# Patient Record
Sex: MALE | Race: WHITE | NOT HISPANIC OR LATINO | Employment: PART TIME | URBAN - METROPOLITAN AREA
[De-identification: names, ages, dates, MRNs, and addresses within clinical notes are randomized per-mention and may not be internally consistent; named-entity substitution may affect disease eponyms.]

---

## 2017-05-11 ENCOUNTER — HOSPITAL ENCOUNTER (INPATIENT)
Facility: HOSPITAL | Age: 57
LOS: 15 days | Discharge: HOME HEALTH | End: 2017-05-26
Attending: PHYSICAL MEDICINE & REHABILITATION | Admitting: PHYSICAL MEDICINE & REHABILITATION

## 2017-05-11 LAB
GLUCOSE BLDC GLUCOMTR-MCNC: 191 MG/DL (ref 70–130)
GLUCOSE BLDC GLUCOMTR-MCNC: 311 MG/DL (ref 70–130)

## 2017-05-11 PROCEDURE — 63710000001 INSULIN DETEMER PER 5 UNITS: Performed by: PHYSICAL MEDICINE & REHABILITATION

## 2017-05-11 PROCEDURE — 63710000001 INSULIN REGULAR HUMAN PER 5 UNITS: Performed by: PHYSICAL MEDICINE & REHABILITATION

## 2017-05-11 PROCEDURE — 82962 GLUCOSE BLOOD TEST: CPT

## 2017-05-11 RX ORDER — FENOFIBRATE 145 MG/1
145 TABLET, COATED ORAL DAILY
COMMUNITY
End: 2017-10-24 | Stop reason: SDUPTHER

## 2017-05-11 RX ORDER — LEVETIRACETAM 500 MG/1
1000 TABLET ORAL 2 TIMES DAILY
Status: DISCONTINUED | OUTPATIENT
Start: 2017-05-12 | End: 2017-05-11

## 2017-05-11 RX ORDER — NICOTINE POLACRILEX 4 MG
15 LOZENGE BUCCAL
Status: DISCONTINUED | OUTPATIENT
Start: 2017-05-11 | End: 2017-05-26 | Stop reason: HOSPADM

## 2017-05-11 RX ORDER — ROSUVASTATIN CALCIUM 10 MG/1
10 TABLET, COATED ORAL NIGHTLY
Status: DISCONTINUED | OUTPATIENT
Start: 2017-05-11 | End: 2017-05-16

## 2017-05-11 RX ORDER — AMITRIPTYLINE HYDROCHLORIDE 25 MG/1
25 TABLET, FILM COATED ORAL NIGHTLY
COMMUNITY

## 2017-05-11 RX ORDER — CARVEDILOL PHOSPHATE 20 MG/1
20 CAPSULE, EXTENDED RELEASE ORAL
Status: DISCONTINUED | OUTPATIENT
Start: 2017-05-12 | End: 2017-05-26 | Stop reason: HOSPADM

## 2017-05-11 RX ORDER — UREA 10 %
3 LOTION (ML) TOPICAL NIGHTLY PRN
Status: DISCONTINUED | OUTPATIENT
Start: 2017-05-11 | End: 2017-05-26 | Stop reason: HOSPADM

## 2017-05-11 RX ORDER — ATORVASTATIN CALCIUM 20 MG/1
20 TABLET, FILM COATED ORAL DAILY
Status: ON HOLD | COMMUNITY
End: 2017-05-25

## 2017-05-11 RX ORDER — ASPIRIN 81 MG/1
81 TABLET, CHEWABLE ORAL DAILY
Status: DISCONTINUED | OUTPATIENT
Start: 2017-05-12 | End: 2017-05-26 | Stop reason: HOSPADM

## 2017-05-11 RX ORDER — ATORVASTATIN CALCIUM 20 MG/1
20 TABLET, FILM COATED ORAL NIGHTLY
Status: DISCONTINUED | OUTPATIENT
Start: 2017-05-11 | End: 2017-05-11 | Stop reason: CLARIF

## 2017-05-11 RX ORDER — AMITRIPTYLINE HYDROCHLORIDE 25 MG/1
25 TABLET, FILM COATED ORAL NIGHTLY
Status: DISCONTINUED | OUTPATIENT
Start: 2017-05-11 | End: 2017-05-26 | Stop reason: HOSPADM

## 2017-05-11 RX ORDER — FENOFIBRATE 145 MG/1
145 TABLET, COATED ORAL DAILY
Status: DISCONTINUED | OUTPATIENT
Start: 2017-05-12 | End: 2017-05-26 | Stop reason: HOSPADM

## 2017-05-11 RX ORDER — LEVETIRACETAM 500 MG/1
1000 TABLET, EXTENDED RELEASE ORAL DAILY
Status: DISCONTINUED | OUTPATIENT
Start: 2017-05-12 | End: 2017-05-26 | Stop reason: HOSPADM

## 2017-05-11 RX ORDER — HYDRALAZINE HYDROCHLORIDE 10 MG/1
10 TABLET, FILM COATED ORAL EVERY 6 HOURS PRN
Status: DISCONTINUED | OUTPATIENT
Start: 2017-05-11 | End: 2017-05-26 | Stop reason: HOSPADM

## 2017-05-11 RX ORDER — LISINOPRIL 40 MG/1
40 TABLET ORAL
Status: DISCONTINUED | OUTPATIENT
Start: 2017-05-12 | End: 2017-05-26 | Stop reason: HOSPADM

## 2017-05-11 RX ORDER — AMLODIPINE BESYLATE 5 MG/1
5 TABLET ORAL DAILY
COMMUNITY
End: 2017-05-26 | Stop reason: HOSPADM

## 2017-05-11 RX ORDER — CLOPIDOGREL BISULFATE 75 MG/1
150 TABLET ORAL DAILY
Status: DISCONTINUED | OUTPATIENT
Start: 2017-05-12 | End: 2017-05-26 | Stop reason: HOSPADM

## 2017-05-11 RX ORDER — CARVEDILOL 25 MG/1
25 TABLET ORAL 2 TIMES DAILY WITH MEALS
COMMUNITY

## 2017-05-11 RX ORDER — AMLODIPINE BESYLATE 5 MG/1
5 TABLET ORAL
Status: DISCONTINUED | OUTPATIENT
Start: 2017-05-12 | End: 2017-05-13

## 2017-05-11 RX ORDER — LISINOPRIL 40 MG/1
40 TABLET ORAL DAILY
Status: ON HOLD | COMMUNITY
End: 2017-05-25

## 2017-05-11 RX ORDER — CLOPIDOGREL BISULFATE 75 MG/1
75 TABLET ORAL DAILY
COMMUNITY
End: 2017-05-26 | Stop reason: HOSPADM

## 2017-05-11 RX ORDER — DEXTROSE MONOHYDRATE 25 G/50ML
25 INJECTION, SOLUTION INTRAVENOUS
Status: DISCONTINUED | OUTPATIENT
Start: 2017-05-11 | End: 2017-05-26 | Stop reason: HOSPADM

## 2017-05-11 RX ADMIN — INSULIN DETEMIR 80 UNITS: 100 INJECTION, SOLUTION SUBCUTANEOUS at 21:35

## 2017-05-11 RX ADMIN — ROSUVASTATIN CALCIUM 10 MG: 10 TABLET, FILM COATED ORAL at 21:00

## 2017-05-11 RX ADMIN — HUMAN INSULIN 6 UNITS: 100 INJECTION, SOLUTION SUBCUTANEOUS at 17:03

## 2017-05-11 RX ADMIN — HUMAN INSULIN 12 UNITS: 100 INJECTION, SOLUTION SUBCUTANEOUS at 21:34

## 2017-05-11 RX ADMIN — METFORMIN HYDROCHLORIDE 500 MG: 500 TABLET ORAL at 17:03

## 2017-05-11 RX ADMIN — HYDRALAZINE HYDROCHLORIDE 10 MG: 10 TABLET, FILM COATED ORAL at 16:57

## 2017-05-11 RX ADMIN — AMITRIPTYLINE HYDROCHLORIDE 25 MG: 25 TABLET, FILM COATED ORAL at 21:00

## 2017-05-12 PROBLEM — I63.9 STROKE (HCC): Status: ACTIVE | Noted: 2017-05-12

## 2017-05-12 LAB
25(OH)D3 SERPL-MCNC: <5 NG/ML (ref 30–100)
GLUCOSE BLDC GLUCOMTR-MCNC: 157 MG/DL (ref 70–130)
GLUCOSE BLDC GLUCOMTR-MCNC: 165 MG/DL (ref 70–130)
GLUCOSE BLDC GLUCOMTR-MCNC: 215 MG/DL (ref 70–130)
GLUCOSE BLDC GLUCOMTR-MCNC: 222 MG/DL (ref 70–130)

## 2017-05-12 PROCEDURE — 96125 COGNITIVE TEST BY HC PRO: CPT

## 2017-05-12 PROCEDURE — 63710000001 INSULIN REGULAR HUMAN PER 5 UNITS: Performed by: PHYSICAL MEDICINE & REHABILITATION

## 2017-05-12 PROCEDURE — 82962 GLUCOSE BLOOD TEST: CPT

## 2017-05-12 PROCEDURE — 97165 OT EVAL LOW COMPLEX 30 MIN: CPT

## 2017-05-12 PROCEDURE — 97535 SELF CARE MNGMENT TRAINING: CPT

## 2017-05-12 PROCEDURE — 97110 THERAPEUTIC EXERCISES: CPT

## 2017-05-12 PROCEDURE — 63710000001 INSULIN DETEMER PER 5 UNITS: Performed by: PHYSICAL MEDICINE & REHABILITATION

## 2017-05-12 PROCEDURE — 82306 VITAMIN D 25 HYDROXY: CPT | Performed by: PHYSICAL MEDICINE & REHABILITATION

## 2017-05-12 PROCEDURE — 97162 PT EVAL MOD COMPLEX 30 MIN: CPT

## 2017-05-12 RX ORDER — ERGOCALCIFEROL 1.25 MG/1
50000 CAPSULE ORAL
Status: DISCONTINUED | OUTPATIENT
Start: 2017-05-12 | End: 2017-05-26 | Stop reason: HOSPADM

## 2017-05-12 RX ADMIN — HUMAN INSULIN 6 UNITS: 100 INJECTION, SOLUTION SUBCUTANEOUS at 16:54

## 2017-05-12 RX ADMIN — LISINOPRIL 40 MG: 40 TABLET ORAL at 09:10

## 2017-05-12 RX ADMIN — HYDRALAZINE HYDROCHLORIDE 10 MG: 10 TABLET, FILM COATED ORAL at 06:28

## 2017-05-12 RX ADMIN — ROSUVASTATIN CALCIUM 10 MG: 10 TABLET, FILM COATED ORAL at 22:23

## 2017-05-12 RX ADMIN — LEVETIRACETAM 1000 MG: 500 TABLET, EXTENDED RELEASE ORAL at 09:10

## 2017-05-12 RX ADMIN — AMITRIPTYLINE HYDROCHLORIDE 25 MG: 25 TABLET, FILM COATED ORAL at 22:23

## 2017-05-12 RX ADMIN — AMLODIPINE BESYLATE 5 MG: 5 TABLET ORAL at 09:10

## 2017-05-12 RX ADMIN — CLOPIDOGREL 150 MG: 75 TABLET, FILM COATED ORAL at 09:10

## 2017-05-12 RX ADMIN — ASPIRIN 81 MG: 81 TABLET, CHEWABLE ORAL at 09:10

## 2017-05-12 RX ADMIN — METFORMIN HYDROCHLORIDE 500 MG: 500 TABLET ORAL at 09:10

## 2017-05-12 RX ADMIN — ERGOCALCIFEROL 50000 UNITS: 1.25 CAPSULE ORAL at 12:34

## 2017-05-12 RX ADMIN — INSULIN DETEMIR 80 UNITS: 100 INJECTION, SOLUTION SUBCUTANEOUS at 22:23

## 2017-05-12 RX ADMIN — HYDRALAZINE HYDROCHLORIDE 10 MG: 10 TABLET, FILM COATED ORAL at 16:53

## 2017-05-12 RX ADMIN — FENOFIBRATE 145 MG: 145 TABLET ORAL at 12:34

## 2017-05-12 RX ADMIN — HUMAN INSULIN 6 UNITS: 100 INJECTION, SOLUTION SUBCUTANEOUS at 22:44

## 2017-05-12 RX ADMIN — CARVEDILOL PHOSPHATE 20 MG: 20 CAPSULE, EXTENDED RELEASE ORAL at 09:10

## 2017-05-12 RX ADMIN — METFORMIN HYDROCHLORIDE 500 MG: 500 TABLET ORAL at 16:53

## 2017-05-12 RX ADMIN — LINAGLIPTIN 5 MG: 5 TABLET, FILM COATED ORAL at 09:10

## 2017-05-12 RX ADMIN — HUMAN INSULIN 8 UNITS: 100 INJECTION, SOLUTION SUBCUTANEOUS at 12:34

## 2017-05-12 RX ADMIN — HUMAN INSULIN 8 UNITS: 100 INJECTION, SOLUTION SUBCUTANEOUS at 09:17

## 2017-05-13 LAB
GLUCOSE BLDC GLUCOMTR-MCNC: 160 MG/DL (ref 70–130)
GLUCOSE BLDC GLUCOMTR-MCNC: 185 MG/DL (ref 70–130)
GLUCOSE BLDC GLUCOMTR-MCNC: 202 MG/DL (ref 70–130)
GLUCOSE BLDC GLUCOMTR-MCNC: 259 MG/DL (ref 70–130)

## 2017-05-13 PROCEDURE — 97110 THERAPEUTIC EXERCISES: CPT

## 2017-05-13 PROCEDURE — 97535 SELF CARE MNGMENT TRAINING: CPT | Performed by: OCCUPATIONAL THERAPIST

## 2017-05-13 PROCEDURE — 97532: CPT

## 2017-05-13 PROCEDURE — 63710000001 INSULIN REGULAR HUMAN PER 5 UNITS: Performed by: PHYSICAL MEDICINE & REHABILITATION

## 2017-05-13 PROCEDURE — 63710000001 INSULIN ASPART PER 5 UNITS: Performed by: PHYSICAL MEDICINE & REHABILITATION

## 2017-05-13 PROCEDURE — 97112 NEUROMUSCULAR REEDUCATION: CPT | Performed by: OCCUPATIONAL THERAPIST

## 2017-05-13 PROCEDURE — 82962 GLUCOSE BLOOD TEST: CPT

## 2017-05-13 RX ORDER — AMLODIPINE BESYLATE 5 MG/1
5 TABLET ORAL ONCE
Status: COMPLETED | OUTPATIENT
Start: 2017-05-13 | End: 2017-05-13

## 2017-05-13 RX ORDER — AMLODIPINE BESYLATE 10 MG/1
10 TABLET ORAL
Status: DISCONTINUED | OUTPATIENT
Start: 2017-05-14 | End: 2017-05-26 | Stop reason: HOSPADM

## 2017-05-13 RX ADMIN — LEVETIRACETAM 1000 MG: 500 TABLET, EXTENDED RELEASE ORAL at 08:19

## 2017-05-13 RX ADMIN — LISINOPRIL 40 MG: 40 TABLET ORAL at 08:19

## 2017-05-13 RX ADMIN — INSULIN ASPART 8 UNITS: 100 INJECTION, SOLUTION INTRAVENOUS; SUBCUTANEOUS at 12:01

## 2017-05-13 RX ADMIN — FENOFIBRATE 145 MG: 145 TABLET ORAL at 08:19

## 2017-05-13 RX ADMIN — ROSUVASTATIN CALCIUM 10 MG: 10 TABLET, FILM COATED ORAL at 22:06

## 2017-05-13 RX ADMIN — CLOPIDOGREL 150 MG: 75 TABLET, FILM COATED ORAL at 08:18

## 2017-05-13 RX ADMIN — METFORMIN HYDROCHLORIDE 500 MG: 500 TABLET ORAL at 17:12

## 2017-05-13 RX ADMIN — HUMAN INSULIN 6 UNITS: 100 INJECTION, SOLUTION SUBCUTANEOUS at 08:19

## 2017-05-13 RX ADMIN — INSULIN ASPART 5 UNITS: 100 INJECTION, SOLUTION INTRAVENOUS; SUBCUTANEOUS at 22:05

## 2017-05-13 RX ADMIN — HYDRALAZINE HYDROCHLORIDE 10 MG: 10 TABLET, FILM COATED ORAL at 06:43

## 2017-05-13 RX ADMIN — AMITRIPTYLINE HYDROCHLORIDE 25 MG: 25 TABLET, FILM COATED ORAL at 22:06

## 2017-05-13 RX ADMIN — ASPIRIN 81 MG: 81 TABLET, CHEWABLE ORAL at 08:19

## 2017-05-13 RX ADMIN — INSULIN ASPART 3 UNITS: 100 INJECTION, SOLUTION INTRAVENOUS; SUBCUTANEOUS at 17:12

## 2017-05-13 RX ADMIN — CARVEDILOL PHOSPHATE 20 MG: 20 CAPSULE, EXTENDED RELEASE ORAL at 08:18

## 2017-05-13 RX ADMIN — AMLODIPINE BESYLATE 5 MG: 5 TABLET ORAL at 08:18

## 2017-05-13 RX ADMIN — LINAGLIPTIN 5 MG: 5 TABLET, FILM COATED ORAL at 08:18

## 2017-05-13 RX ADMIN — AMLODIPINE BESYLATE 5 MG: 5 TABLET ORAL at 12:19

## 2017-05-13 RX ADMIN — INSULIN DETEMIR 80 UNITS: 100 INJECTION, SOLUTION SUBCUTANEOUS at 22:06

## 2017-05-13 RX ADMIN — METFORMIN HYDROCHLORIDE 500 MG: 500 TABLET ORAL at 08:19

## 2017-05-14 LAB
GLUCOSE BLDC GLUCOMTR-MCNC: 115 MG/DL (ref 70–130)
GLUCOSE BLDC GLUCOMTR-MCNC: 189 MG/DL (ref 70–130)
GLUCOSE BLDC GLUCOMTR-MCNC: 285 MG/DL (ref 70–130)
GLUCOSE BLDC GLUCOMTR-MCNC: 96 MG/DL (ref 70–130)

## 2017-05-14 PROCEDURE — 82962 GLUCOSE BLOOD TEST: CPT

## 2017-05-14 PROCEDURE — 63710000001 INSULIN DETEMER PER 5 UNITS: Performed by: PHYSICAL MEDICINE & REHABILITATION

## 2017-05-14 PROCEDURE — 63710000001 INSULIN ASPART PER 5 UNITS: Performed by: PHYSICAL MEDICINE & REHABILITATION

## 2017-05-14 RX ADMIN — LISINOPRIL 40 MG: 40 TABLET ORAL at 09:49

## 2017-05-14 RX ADMIN — INSULIN ASPART 8 UNITS: 100 INJECTION, SOLUTION INTRAVENOUS; SUBCUTANEOUS at 22:04

## 2017-05-14 RX ADMIN — METFORMIN HYDROCHLORIDE 500 MG: 500 TABLET ORAL at 17:37

## 2017-05-14 RX ADMIN — AMITRIPTYLINE HYDROCHLORIDE 25 MG: 25 TABLET, FILM COATED ORAL at 22:01

## 2017-05-14 RX ADMIN — INSULIN DETEMIR 80 UNITS: 100 INJECTION, SOLUTION SUBCUTANEOUS at 22:04

## 2017-05-14 RX ADMIN — ROSUVASTATIN CALCIUM 10 MG: 10 TABLET, FILM COATED ORAL at 22:01

## 2017-05-14 RX ADMIN — CARVEDILOL PHOSPHATE 20 MG: 20 CAPSULE, EXTENDED RELEASE ORAL at 12:13

## 2017-05-14 RX ADMIN — LINAGLIPTIN 5 MG: 5 TABLET, FILM COATED ORAL at 09:49

## 2017-05-14 RX ADMIN — METFORMIN HYDROCHLORIDE 500 MG: 500 TABLET ORAL at 09:47

## 2017-05-14 RX ADMIN — AMLODIPINE BESYLATE 10 MG: 10 TABLET ORAL at 09:48

## 2017-05-14 RX ADMIN — CLOPIDOGREL 150 MG: 75 TABLET, FILM COATED ORAL at 09:48

## 2017-05-14 RX ADMIN — INSULIN ASPART 3 UNITS: 100 INJECTION, SOLUTION INTRAVENOUS; SUBCUTANEOUS at 17:37

## 2017-05-14 RX ADMIN — ASPIRIN 81 MG: 81 TABLET, CHEWABLE ORAL at 09:47

## 2017-05-14 RX ADMIN — LEVETIRACETAM 1000 MG: 500 TABLET, EXTENDED RELEASE ORAL at 09:47

## 2017-05-14 RX ADMIN — FENOFIBRATE 145 MG: 145 TABLET ORAL at 09:48

## 2017-05-15 LAB
GLUCOSE BLDC GLUCOMTR-MCNC: 198 MG/DL (ref 70–130)
GLUCOSE BLDC GLUCOMTR-MCNC: 202 MG/DL (ref 70–130)
GLUCOSE BLDC GLUCOMTR-MCNC: 287 MG/DL (ref 70–130)
GLUCOSE BLDC GLUCOMTR-MCNC: 288 MG/DL (ref 70–130)
GLUCOSE BLDC GLUCOMTR-MCNC: 305 MG/DL (ref 70–130)
GLUCOSE BLDC GLUCOMTR-MCNC: 311 MG/DL (ref 70–130)

## 2017-05-15 PROCEDURE — 97532: CPT

## 2017-05-15 PROCEDURE — 63710000001 INSULIN ASPART PER 5 UNITS: Performed by: PHYSICAL MEDICINE & REHABILITATION

## 2017-05-15 PROCEDURE — 97112 NEUROMUSCULAR REEDUCATION: CPT

## 2017-05-15 PROCEDURE — 97535 SELF CARE MNGMENT TRAINING: CPT

## 2017-05-15 PROCEDURE — 97110 THERAPEUTIC EXERCISES: CPT

## 2017-05-15 PROCEDURE — 82962 GLUCOSE BLOOD TEST: CPT

## 2017-05-15 RX ADMIN — LINAGLIPTIN 5 MG: 5 TABLET, FILM COATED ORAL at 08:14

## 2017-05-15 RX ADMIN — FENOFIBRATE 145 MG: 145 TABLET ORAL at 08:14

## 2017-05-15 RX ADMIN — AMITRIPTYLINE HYDROCHLORIDE 25 MG: 25 TABLET, FILM COATED ORAL at 23:06

## 2017-05-15 RX ADMIN — INSULIN ASPART 8 UNITS: 100 INJECTION, SOLUTION INTRAVENOUS; SUBCUTANEOUS at 12:02

## 2017-05-15 RX ADMIN — CARVEDILOL PHOSPHATE 20 MG: 20 CAPSULE, EXTENDED RELEASE ORAL at 08:14

## 2017-05-15 RX ADMIN — METFORMIN HYDROCHLORIDE 500 MG: 500 TABLET ORAL at 17:30

## 2017-05-15 RX ADMIN — INSULIN DETEMIR 80 UNITS: 100 INJECTION, SOLUTION SUBCUTANEOUS at 23:06

## 2017-05-15 RX ADMIN — INSULIN ASPART 3 UNITS: 100 INJECTION, SOLUTION INTRAVENOUS; SUBCUTANEOUS at 17:29

## 2017-05-15 RX ADMIN — CLOPIDOGREL 150 MG: 75 TABLET, FILM COATED ORAL at 08:14

## 2017-05-15 RX ADMIN — INSULIN ASPART 8 UNITS: 100 INJECTION, SOLUTION INTRAVENOUS; SUBCUTANEOUS at 23:16

## 2017-05-15 RX ADMIN — METFORMIN HYDROCHLORIDE 500 MG: 500 TABLET ORAL at 08:14

## 2017-05-15 RX ADMIN — LEVETIRACETAM 1000 MG: 500 TABLET, EXTENDED RELEASE ORAL at 08:14

## 2017-05-15 RX ADMIN — AMLODIPINE BESYLATE 10 MG: 10 TABLET ORAL at 08:15

## 2017-05-15 RX ADMIN — INSULIN ASPART 5 UNITS: 100 INJECTION, SOLUTION INTRAVENOUS; SUBCUTANEOUS at 08:12

## 2017-05-15 RX ADMIN — ROSUVASTATIN CALCIUM 10 MG: 10 TABLET, FILM COATED ORAL at 23:06

## 2017-05-15 RX ADMIN — LISINOPRIL 40 MG: 40 TABLET ORAL at 08:14

## 2017-05-15 RX ADMIN — ASPIRIN 81 MG: 81 TABLET, CHEWABLE ORAL at 08:15

## 2017-05-16 LAB
ALBUMIN SERPL-MCNC: 3.8 G/DL (ref 3.5–5.2)
ALBUMIN/GLOB SERPL: 1.6 G/DL
ALP SERPL-CCNC: 54 U/L (ref 39–117)
ALT SERPL W P-5'-P-CCNC: 18 U/L (ref 1–41)
ANION GAP SERPL CALCULATED.3IONS-SCNC: 12.7 MMOL/L
AST SERPL-CCNC: 15 U/L (ref 1–40)
BASOPHILS # BLD AUTO: 0.02 10*3/MM3 (ref 0–0.2)
BASOPHILS NFR BLD AUTO: 0.4 % (ref 0–1.5)
BILIRUB SERPL-MCNC: 0.2 MG/DL (ref 0.1–1.2)
BUN BLD-MCNC: 26 MG/DL (ref 6–20)
BUN/CREAT SERPL: 15.5 (ref 7–25)
CALCIUM SPEC-SCNC: 9.2 MG/DL (ref 8.6–10.5)
CHLORIDE SERPL-SCNC: 99 MMOL/L (ref 98–107)
CO2 SERPL-SCNC: 25.3 MMOL/L (ref 22–29)
CREAT BLD-MCNC: 1.68 MG/DL (ref 0.76–1.27)
DEPRECATED RDW RBC AUTO: 40.7 FL (ref 37–54)
EOSINOPHIL # BLD AUTO: 0.24 10*3/MM3 (ref 0–0.7)
EOSINOPHIL NFR BLD AUTO: 4.3 % (ref 0.3–6.2)
ERYTHROCYTE [DISTWIDTH] IN BLOOD BY AUTOMATED COUNT: 13.7 % (ref 11.5–14.5)
GFR SERPL CREATININE-BSD FRML MDRD: 42 ML/MIN/1.73
GLOBULIN UR ELPH-MCNC: 2.4 GM/DL
GLUCOSE BLD-MCNC: 191 MG/DL (ref 65–99)
GLUCOSE BLDC GLUCOMTR-MCNC: 192 MG/DL (ref 70–130)
GLUCOSE BLDC GLUCOMTR-MCNC: 232 MG/DL (ref 70–130)
GLUCOSE BLDC GLUCOMTR-MCNC: 250 MG/DL (ref 70–130)
GLUCOSE BLDC GLUCOMTR-MCNC: 270 MG/DL (ref 70–130)
HCT VFR BLD AUTO: 29.8 % (ref 40.4–52.2)
HGB BLD-MCNC: 10.5 G/DL (ref 13.7–17.6)
IMM GRANULOCYTES # BLD: 0.03 10*3/MM3 (ref 0–0.03)
IMM GRANULOCYTES NFR BLD: 0.5 % (ref 0–0.5)
LYMPHOCYTES # BLD AUTO: 1.03 10*3/MM3 (ref 0.9–4.8)
LYMPHOCYTES NFR BLD AUTO: 18.6 % (ref 19.6–45.3)
MCH RBC QN AUTO: 28.8 PG (ref 27–32.7)
MCHC RBC AUTO-ENTMCNC: 35.2 G/DL (ref 32.6–36.4)
MCV RBC AUTO: 81.9 FL (ref 79.8–96.2)
MONOCYTES # BLD AUTO: 0.64 10*3/MM3 (ref 0.2–1.2)
MONOCYTES NFR BLD AUTO: 11.5 % (ref 5–12)
NEUTROPHILS # BLD AUTO: 3.59 10*3/MM3 (ref 1.9–8.1)
NEUTROPHILS NFR BLD AUTO: 64.7 % (ref 42.7–76)
PLATELET # BLD AUTO: 193 10*3/MM3 (ref 140–500)
PMV BLD AUTO: 10.2 FL (ref 6–12)
POTASSIUM BLD-SCNC: 4.3 MMOL/L (ref 3.5–5.2)
PROT SERPL-MCNC: 6.2 G/DL (ref 6–8.5)
RBC # BLD AUTO: 3.64 10*6/MM3 (ref 4.6–6)
SODIUM BLD-SCNC: 137 MMOL/L (ref 136–145)
WBC NRBC COR # BLD: 5.55 10*3/MM3 (ref 4.5–10.7)

## 2017-05-16 PROCEDURE — 99254 IP/OBS CNSLTJ NEW/EST MOD 60: CPT | Performed by: PSYCHIATRY & NEUROLOGY

## 2017-05-16 PROCEDURE — 85025 COMPLETE CBC W/AUTO DIFF WBC: CPT | Performed by: PHYSICAL MEDICINE & REHABILITATION

## 2017-05-16 PROCEDURE — 82962 GLUCOSE BLOOD TEST: CPT

## 2017-05-16 PROCEDURE — 97535 SELF CARE MNGMENT TRAINING: CPT

## 2017-05-16 PROCEDURE — 63710000001 INSULIN ASPART PER 5 UNITS: Performed by: PHYSICAL MEDICINE & REHABILITATION

## 2017-05-16 PROCEDURE — 97112 NEUROMUSCULAR REEDUCATION: CPT

## 2017-05-16 PROCEDURE — 80053 COMPREHEN METABOLIC PANEL: CPT | Performed by: PHYSICAL MEDICINE & REHABILITATION

## 2017-05-16 PROCEDURE — 97110 THERAPEUTIC EXERCISES: CPT

## 2017-05-16 PROCEDURE — 97532: CPT

## 2017-05-16 RX ORDER — ROSUVASTATIN CALCIUM 20 MG/1
20 TABLET, COATED ORAL NIGHTLY
Status: DISCONTINUED | OUTPATIENT
Start: 2017-05-16 | End: 2017-05-26 | Stop reason: HOSPADM

## 2017-05-16 RX ADMIN — INSULIN ASPART 8 UNITS: 100 INJECTION, SOLUTION INTRAVENOUS; SUBCUTANEOUS at 11:40

## 2017-05-16 RX ADMIN — INSULIN ASPART 8 UNITS: 100 INJECTION, SOLUTION INTRAVENOUS; SUBCUTANEOUS at 22:45

## 2017-05-16 RX ADMIN — CARVEDILOL PHOSPHATE 20 MG: 20 CAPSULE, EXTENDED RELEASE ORAL at 08:39

## 2017-05-16 RX ADMIN — INSULIN ASPART 5 UNITS: 100 INJECTION, SOLUTION INTRAVENOUS; SUBCUTANEOUS at 08:40

## 2017-05-16 RX ADMIN — ASPIRIN 81 MG: 81 TABLET, CHEWABLE ORAL at 08:39

## 2017-05-16 RX ADMIN — LINAGLIPTIN 5 MG: 5 TABLET, FILM COATED ORAL at 08:39

## 2017-05-16 RX ADMIN — FENOFIBRATE 145 MG: 145 TABLET ORAL at 08:39

## 2017-05-16 RX ADMIN — CLOPIDOGREL 150 MG: 75 TABLET, FILM COATED ORAL at 08:37

## 2017-05-16 RX ADMIN — HYDRALAZINE HYDROCHLORIDE 10 MG: 10 TABLET, FILM COATED ORAL at 04:02

## 2017-05-16 RX ADMIN — METFORMIN HYDROCHLORIDE 500 MG: 500 TABLET ORAL at 17:06

## 2017-05-16 RX ADMIN — INSULIN DETEMIR 80 UNITS: 100 INJECTION, SOLUTION SUBCUTANEOUS at 20:36

## 2017-05-16 RX ADMIN — AMITRIPTYLINE HYDROCHLORIDE 25 MG: 25 TABLET, FILM COATED ORAL at 20:34

## 2017-05-16 RX ADMIN — METFORMIN HYDROCHLORIDE 500 MG: 500 TABLET ORAL at 08:39

## 2017-05-16 RX ADMIN — LEVETIRACETAM 1000 MG: 500 TABLET, EXTENDED RELEASE ORAL at 08:39

## 2017-05-16 RX ADMIN — LISINOPRIL 40 MG: 40 TABLET ORAL at 08:39

## 2017-05-16 RX ADMIN — INSULIN ASPART 3 UNITS: 100 INJECTION, SOLUTION INTRAVENOUS; SUBCUTANEOUS at 17:06

## 2017-05-16 RX ADMIN — ROSUVASTATIN CALCIUM 20 MG: 20 TABLET, FILM COATED ORAL at 20:34

## 2017-05-16 RX ADMIN — AMLODIPINE BESYLATE 10 MG: 10 TABLET ORAL at 08:40

## 2017-05-17 ENCOUNTER — APPOINTMENT (OUTPATIENT)
Dept: MRI IMAGING | Facility: HOSPITAL | Age: 57
End: 2017-05-17

## 2017-05-17 LAB
ANION GAP SERPL CALCULATED.3IONS-SCNC: 14.9 MMOL/L
BUN BLD-MCNC: 24 MG/DL (ref 6–20)
BUN/CREAT SERPL: 16 (ref 7–25)
CALCIUM SPEC-SCNC: 9.8 MG/DL (ref 8.6–10.5)
CHLORIDE SERPL-SCNC: 103 MMOL/L (ref 98–107)
CO2 SERPL-SCNC: 20.1 MMOL/L (ref 22–29)
CREAT BLD-MCNC: 1.5 MG/DL (ref 0.76–1.27)
GFR SERPL CREATININE-BSD FRML MDRD: 48 ML/MIN/1.73
GLUCOSE BLD-MCNC: 179 MG/DL (ref 65–99)
GLUCOSE BLDC GLUCOMTR-MCNC: 158 MG/DL (ref 70–130)
GLUCOSE BLDC GLUCOMTR-MCNC: 185 MG/DL (ref 70–130)
GLUCOSE BLDC GLUCOMTR-MCNC: 236 MG/DL (ref 70–130)
GLUCOSE BLDC GLUCOMTR-MCNC: 237 MG/DL (ref 70–130)
POTASSIUM BLD-SCNC: 4.5 MMOL/L (ref 3.5–5.2)
SODIUM BLD-SCNC: 138 MMOL/L (ref 136–145)

## 2017-05-17 PROCEDURE — 82962 GLUCOSE BLOOD TEST: CPT

## 2017-05-17 PROCEDURE — 70551 MRI BRAIN STEM W/O DYE: CPT

## 2017-05-17 PROCEDURE — 97532: CPT

## 2017-05-17 PROCEDURE — 63710000001 INSULIN ASPART PER 5 UNITS: Performed by: PHYSICAL MEDICINE & REHABILITATION

## 2017-05-17 PROCEDURE — 97110 THERAPEUTIC EXERCISES: CPT

## 2017-05-17 PROCEDURE — 63710000001 INSULIN ASPART PER 5 UNITS: Performed by: INTERNAL MEDICINE

## 2017-05-17 PROCEDURE — 99255 IP/OBS CONSLTJ NEW/EST HI 80: CPT | Performed by: INTERNAL MEDICINE

## 2017-05-17 PROCEDURE — 80048 BASIC METABOLIC PNL TOTAL CA: CPT | Performed by: PHYSICAL MEDICINE & REHABILITATION

## 2017-05-17 PROCEDURE — 97535 SELF CARE MNGMENT TRAINING: CPT

## 2017-05-17 RX ORDER — SODIUM CHLORIDE 9 MG/ML
100 INJECTION, SOLUTION INTRAVENOUS CONTINUOUS
Status: DISCONTINUED | OUTPATIENT
Start: 2017-05-17 | End: 2017-05-18

## 2017-05-17 RX ADMIN — LINAGLIPTIN 5 MG: 5 TABLET, FILM COATED ORAL at 08:11

## 2017-05-17 RX ADMIN — FENOFIBRATE 145 MG: 145 TABLET ORAL at 08:11

## 2017-05-17 RX ADMIN — CARVEDILOL PHOSPHATE 20 MG: 20 CAPSULE, EXTENDED RELEASE ORAL at 08:11

## 2017-05-17 RX ADMIN — INSULIN ASPART 5 UNITS: 100 INJECTION, SOLUTION INTRAVENOUS; SUBCUTANEOUS at 22:40

## 2017-05-17 RX ADMIN — SODIUM CHLORIDE 100 ML/HR: 9 INJECTION, SOLUTION INTRAVENOUS at 22:47

## 2017-05-17 RX ADMIN — AMITRIPTYLINE HYDROCHLORIDE 25 MG: 25 TABLET, FILM COATED ORAL at 22:35

## 2017-05-17 RX ADMIN — ASPIRIN 81 MG: 81 TABLET, CHEWABLE ORAL at 08:10

## 2017-05-17 RX ADMIN — INSULIN ASPART 3 UNITS: 100 INJECTION, SOLUTION INTRAVENOUS; SUBCUTANEOUS at 08:11

## 2017-05-17 RX ADMIN — AMLODIPINE BESYLATE 10 MG: 10 TABLET ORAL at 08:11

## 2017-05-17 RX ADMIN — METFORMIN HYDROCHLORIDE 500 MG: 500 TABLET ORAL at 08:10

## 2017-05-17 RX ADMIN — SODIUM CHLORIDE 100 ML/HR: 9 INJECTION, SOLUTION INTRAVENOUS at 11:58

## 2017-05-17 RX ADMIN — LEVETIRACETAM 1000 MG: 500 TABLET, EXTENDED RELEASE ORAL at 08:11

## 2017-05-17 RX ADMIN — LISINOPRIL 40 MG: 40 TABLET ORAL at 08:11

## 2017-05-17 RX ADMIN — CLOPIDOGREL 150 MG: 75 TABLET, FILM COATED ORAL at 08:11

## 2017-05-17 RX ADMIN — INSULIN ASPART 3 UNITS: 100 INJECTION, SOLUTION INTRAVENOUS; SUBCUTANEOUS at 11:57

## 2017-05-17 RX ADMIN — INSULIN ASPART 7 UNITS: 100 INJECTION, SOLUTION INTRAVENOUS; SUBCUTANEOUS at 17:33

## 2017-05-17 RX ADMIN — INSULIN ASPART 5 UNITS: 100 INJECTION, SOLUTION INTRAVENOUS; SUBCUTANEOUS at 17:33

## 2017-05-17 RX ADMIN — ROSUVASTATIN CALCIUM 20 MG: 20 TABLET, FILM COATED ORAL at 22:35

## 2017-05-17 RX ADMIN — INSULIN DETEMIR 80 UNITS: 100 INJECTION, SOLUTION SUBCUTANEOUS at 22:35

## 2017-05-18 LAB
ANION GAP SERPL CALCULATED.3IONS-SCNC: 11.9 MMOL/L
BUN BLD-MCNC: 22 MG/DL (ref 6–20)
BUN/CREAT SERPL: 15.3 (ref 7–25)
CALCIUM SPEC-SCNC: 9 MG/DL (ref 8.6–10.5)
CHLORIDE SERPL-SCNC: 104 MMOL/L (ref 98–107)
CO2 SERPL-SCNC: 22.1 MMOL/L (ref 22–29)
CREAT BLD-MCNC: 1.44 MG/DL (ref 0.76–1.27)
GFR SERPL CREATININE-BSD FRML MDRD: 51 ML/MIN/1.73
GLUCOSE BLD-MCNC: 110 MG/DL (ref 65–99)
GLUCOSE BLDC GLUCOMTR-MCNC: 174 MG/DL (ref 70–130)
GLUCOSE BLDC GLUCOMTR-MCNC: 178 MG/DL (ref 70–130)
GLUCOSE BLDC GLUCOMTR-MCNC: 182 MG/DL (ref 70–130)
GLUCOSE BLDC GLUCOMTR-MCNC: 94 MG/DL (ref 70–130)
POTASSIUM BLD-SCNC: 3.8 MMOL/L (ref 3.5–5.2)
SODIUM BLD-SCNC: 138 MMOL/L (ref 136–145)

## 2017-05-18 PROCEDURE — 97110 THERAPEUTIC EXERCISES: CPT

## 2017-05-18 PROCEDURE — 99233 SBSQ HOSP IP/OBS HIGH 50: CPT | Performed by: INTERNAL MEDICINE

## 2017-05-18 PROCEDURE — 97112 NEUROMUSCULAR REEDUCATION: CPT

## 2017-05-18 PROCEDURE — 63710000001 INSULIN DETEMER PER 5 UNITS: Performed by: PHYSICAL MEDICINE & REHABILITATION

## 2017-05-18 PROCEDURE — 99233 SBSQ HOSP IP/OBS HIGH 50: CPT | Performed by: NURSE PRACTITIONER

## 2017-05-18 PROCEDURE — 63710000001 INSULIN ASPART PER 5 UNITS: Performed by: PHYSICAL MEDICINE & REHABILITATION

## 2017-05-18 PROCEDURE — 80048 BASIC METABOLIC PNL TOTAL CA: CPT | Performed by: PHYSICAL MEDICINE & REHABILITATION

## 2017-05-18 PROCEDURE — 82962 GLUCOSE BLOOD TEST: CPT

## 2017-05-18 PROCEDURE — 63710000001 INSULIN ASPART PER 5 UNITS: Performed by: INTERNAL MEDICINE

## 2017-05-18 PROCEDURE — 97535 SELF CARE MNGMENT TRAINING: CPT

## 2017-05-18 PROCEDURE — 97532: CPT

## 2017-05-18 RX ADMIN — LEVETIRACETAM 1000 MG: 500 TABLET, EXTENDED RELEASE ORAL at 08:43

## 2017-05-18 RX ADMIN — AMITRIPTYLINE HYDROCHLORIDE 25 MG: 25 TABLET, FILM COATED ORAL at 20:50

## 2017-05-18 RX ADMIN — ASPIRIN 81 MG: 81 TABLET, CHEWABLE ORAL at 08:42

## 2017-05-18 RX ADMIN — INSULIN DETEMIR 80 UNITS: 100 INJECTION, SOLUTION SUBCUTANEOUS at 20:50

## 2017-05-18 RX ADMIN — ROSUVASTATIN CALCIUM 20 MG: 20 TABLET, FILM COATED ORAL at 20:50

## 2017-05-18 RX ADMIN — INSULIN ASPART 7 UNITS: 100 INJECTION, SOLUTION INTRAVENOUS; SUBCUTANEOUS at 16:45

## 2017-05-18 RX ADMIN — INSULIN ASPART 7 UNITS: 100 INJECTION, SOLUTION INTRAVENOUS; SUBCUTANEOUS at 11:37

## 2017-05-18 RX ADMIN — LISINOPRIL 40 MG: 40 TABLET ORAL at 08:43

## 2017-05-18 RX ADMIN — INSULIN ASPART 3 UNITS: 100 INJECTION, SOLUTION INTRAVENOUS; SUBCUTANEOUS at 20:56

## 2017-05-18 RX ADMIN — INSULIN ASPART 3 UNITS: 100 INJECTION, SOLUTION INTRAVENOUS; SUBCUTANEOUS at 11:36

## 2017-05-18 RX ADMIN — CLOPIDOGREL 150 MG: 75 TABLET, FILM COATED ORAL at 08:42

## 2017-05-18 RX ADMIN — AMLODIPINE BESYLATE 10 MG: 10 TABLET ORAL at 08:42

## 2017-05-18 RX ADMIN — FENOFIBRATE 145 MG: 145 TABLET ORAL at 08:43

## 2017-05-18 RX ADMIN — INSULIN ASPART 3 UNITS: 100 INJECTION, SOLUTION INTRAVENOUS; SUBCUTANEOUS at 16:45

## 2017-05-18 RX ADMIN — INSULIN ASPART 7 UNITS: 100 INJECTION, SOLUTION INTRAVENOUS; SUBCUTANEOUS at 08:44

## 2017-05-18 RX ADMIN — LINAGLIPTIN 5 MG: 5 TABLET, FILM COATED ORAL at 08:42

## 2017-05-18 RX ADMIN — CARVEDILOL PHOSPHATE 20 MG: 20 CAPSULE, EXTENDED RELEASE ORAL at 08:43

## 2017-05-19 LAB
ANION GAP SERPL CALCULATED.3IONS-SCNC: 13.3 MMOL/L
BUN BLD-MCNC: 20 MG/DL (ref 6–20)
BUN/CREAT SERPL: 12.3 (ref 7–25)
CALCIUM SPEC-SCNC: 8.9 MG/DL (ref 8.6–10.5)
CHLORIDE SERPL-SCNC: 102 MMOL/L (ref 98–107)
CO2 SERPL-SCNC: 22.7 MMOL/L (ref 22–29)
CREAT BLD-MCNC: 1.62 MG/DL (ref 0.76–1.27)
GFR SERPL CREATININE-BSD FRML MDRD: 44 ML/MIN/1.73
GLUCOSE BLD-MCNC: 59 MG/DL (ref 65–99)
GLUCOSE BLDC GLUCOMTR-MCNC: 115 MG/DL (ref 70–130)
GLUCOSE BLDC GLUCOMTR-MCNC: 169 MG/DL (ref 70–130)
GLUCOSE BLDC GLUCOMTR-MCNC: 179 MG/DL (ref 70–130)
GLUCOSE BLDC GLUCOMTR-MCNC: 263 MG/DL (ref 70–130)
POTASSIUM BLD-SCNC: 3.9 MMOL/L (ref 3.5–5.2)
SODIUM BLD-SCNC: 138 MMOL/L (ref 136–145)

## 2017-05-19 PROCEDURE — 97535 SELF CARE MNGMENT TRAINING: CPT

## 2017-05-19 PROCEDURE — 82962 GLUCOSE BLOOD TEST: CPT

## 2017-05-19 PROCEDURE — 97110 THERAPEUTIC EXERCISES: CPT

## 2017-05-19 PROCEDURE — 80048 BASIC METABOLIC PNL TOTAL CA: CPT | Performed by: PHYSICAL MEDICINE & REHABILITATION

## 2017-05-19 PROCEDURE — 63710000001 INSULIN DETEMER PER 5 UNITS: Performed by: INTERNAL MEDICINE

## 2017-05-19 PROCEDURE — 97532: CPT

## 2017-05-19 PROCEDURE — 63710000001 INSULIN ASPART PER 5 UNITS: Performed by: INTERNAL MEDICINE

## 2017-05-19 PROCEDURE — 99233 SBSQ HOSP IP/OBS HIGH 50: CPT | Performed by: INTERNAL MEDICINE

## 2017-05-19 PROCEDURE — 63710000001 INSULIN ASPART PER 5 UNITS: Performed by: PHYSICAL MEDICINE & REHABILITATION

## 2017-05-19 RX ADMIN — INSULIN ASPART 7 UNITS: 100 INJECTION, SOLUTION INTRAVENOUS; SUBCUTANEOUS at 17:34

## 2017-05-19 RX ADMIN — INSULIN ASPART 3 UNITS: 100 INJECTION, SOLUTION INTRAVENOUS; SUBCUTANEOUS at 12:06

## 2017-05-19 RX ADMIN — ROSUVASTATIN CALCIUM 20 MG: 20 TABLET, FILM COATED ORAL at 21:40

## 2017-05-19 RX ADMIN — INSULIN ASPART 7 UNITS: 100 INJECTION, SOLUTION INTRAVENOUS; SUBCUTANEOUS at 12:06

## 2017-05-19 RX ADMIN — CLOPIDOGREL 150 MG: 75 TABLET, FILM COATED ORAL at 08:36

## 2017-05-19 RX ADMIN — AMITRIPTYLINE HYDROCHLORIDE 25 MG: 25 TABLET, FILM COATED ORAL at 21:39

## 2017-05-19 RX ADMIN — CARVEDILOL PHOSPHATE 20 MG: 20 CAPSULE, EXTENDED RELEASE ORAL at 08:36

## 2017-05-19 RX ADMIN — LISINOPRIL 40 MG: 40 TABLET ORAL at 08:36

## 2017-05-19 RX ADMIN — LINAGLIPTIN 5 MG: 5 TABLET, FILM COATED ORAL at 08:36

## 2017-05-19 RX ADMIN — ERGOCALCIFEROL 50000 UNITS: 1.25 CAPSULE ORAL at 08:36

## 2017-05-19 RX ADMIN — INSULIN ASPART 3 UNITS: 100 INJECTION, SOLUTION INTRAVENOUS; SUBCUTANEOUS at 17:33

## 2017-05-19 RX ADMIN — INSULIN ASPART 7 UNITS: 100 INJECTION, SOLUTION INTRAVENOUS; SUBCUTANEOUS at 07:57

## 2017-05-19 RX ADMIN — INSULIN ASPART 8 UNITS: 100 INJECTION, SOLUTION INTRAVENOUS; SUBCUTANEOUS at 21:56

## 2017-05-19 RX ADMIN — ASPIRIN 81 MG: 81 TABLET, CHEWABLE ORAL at 08:36

## 2017-05-19 RX ADMIN — LEVETIRACETAM 1000 MG: 500 TABLET, EXTENDED RELEASE ORAL at 08:36

## 2017-05-19 RX ADMIN — AMLODIPINE BESYLATE 10 MG: 10 TABLET ORAL at 08:36

## 2017-05-19 RX ADMIN — FENOFIBRATE 145 MG: 145 TABLET ORAL at 08:36

## 2017-05-19 RX ADMIN — INSULIN DETEMIR 70 UNITS: 100 INJECTION, SOLUTION SUBCUTANEOUS at 21:38

## 2017-05-20 PROBLEM — E55.9 VITAMIN D DEFICIENCY: Status: ACTIVE | Noted: 2017-05-20

## 2017-05-20 PROBLEM — Z79.4 TYPE 2 DIABETES MELLITUS WITH COMPLICATION, WITH LONG-TERM CURRENT USE OF INSULIN (HCC): Status: ACTIVE | Noted: 2017-05-20

## 2017-05-20 PROBLEM — E78.5 HYPERLIPIDEMIA: Status: ACTIVE | Noted: 2017-05-20

## 2017-05-20 PROBLEM — E11.8 TYPE 2 DIABETES MELLITUS WITH COMPLICATION, WITH LONG-TERM CURRENT USE OF INSULIN (HCC): Status: ACTIVE | Noted: 2017-05-20

## 2017-05-20 PROBLEM — R79.89 ELEVATED TSH: Status: ACTIVE | Noted: 2017-05-20

## 2017-05-20 LAB
25(OH)D3 SERPL-MCNC: 7.8 NG/ML (ref 30–100)
ANION GAP SERPL CALCULATED.3IONS-SCNC: 10.9 MMOL/L
BUN BLD-MCNC: 22 MG/DL (ref 6–20)
BUN/CREAT SERPL: 14.4 (ref 7–25)
CALCIUM SPEC-SCNC: 8.9 MG/DL (ref 8.6–10.5)
CHLORIDE SERPL-SCNC: 102 MMOL/L (ref 98–107)
CO2 SERPL-SCNC: 23.1 MMOL/L (ref 22–29)
CREAT BLD-MCNC: 1.53 MG/DL (ref 0.76–1.27)
GFR SERPL CREATININE-BSD FRML MDRD: 47 ML/MIN/1.73
GLUCOSE BLD-MCNC: 201 MG/DL (ref 65–99)
GLUCOSE BLDC GLUCOMTR-MCNC: 157 MG/DL (ref 70–130)
GLUCOSE BLDC GLUCOMTR-MCNC: 200 MG/DL (ref 70–130)
GLUCOSE BLDC GLUCOMTR-MCNC: 208 MG/DL (ref 70–130)
GLUCOSE BLDC GLUCOMTR-MCNC: 276 MG/DL (ref 70–130)
POTASSIUM BLD-SCNC: 4.6 MMOL/L (ref 3.5–5.2)
SODIUM BLD-SCNC: 136 MMOL/L (ref 136–145)
T4 FREE SERPL-MCNC: 0.93 NG/DL (ref 0.93–1.7)
TSH SERPL DL<=0.05 MIU/L-ACNC: 5.96 MIU/ML (ref 0.27–4.2)

## 2017-05-20 PROCEDURE — 84439 ASSAY OF FREE THYROXINE: CPT | Performed by: INTERNAL MEDICINE

## 2017-05-20 PROCEDURE — 97110 THERAPEUTIC EXERCISES: CPT

## 2017-05-20 PROCEDURE — 99232 SBSQ HOSP IP/OBS MODERATE 35: CPT | Performed by: INTERNAL MEDICINE

## 2017-05-20 PROCEDURE — 82962 GLUCOSE BLOOD TEST: CPT

## 2017-05-20 PROCEDURE — 63710000001 INSULIN ASPART PER 5 UNITS: Performed by: PHYSICAL MEDICINE & REHABILITATION

## 2017-05-20 PROCEDURE — 63710000001 INSULIN ASPART PER 5 UNITS: Performed by: INTERNAL MEDICINE

## 2017-05-20 PROCEDURE — 82306 VITAMIN D 25 HYDROXY: CPT | Performed by: INTERNAL MEDICINE

## 2017-05-20 PROCEDURE — 80048 BASIC METABOLIC PNL TOTAL CA: CPT | Performed by: PHYSICAL MEDICINE & REHABILITATION

## 2017-05-20 PROCEDURE — 84443 ASSAY THYROID STIM HORMONE: CPT | Performed by: INTERNAL MEDICINE

## 2017-05-20 RX ADMIN — ROSUVASTATIN CALCIUM 20 MG: 20 TABLET, FILM COATED ORAL at 21:03

## 2017-05-20 RX ADMIN — INSULIN ASPART 12 UNITS: 100 INJECTION, SOLUTION INTRAVENOUS; SUBCUTANEOUS at 16:59

## 2017-05-20 RX ADMIN — AMLODIPINE BESYLATE 10 MG: 10 TABLET ORAL at 07:35

## 2017-05-20 RX ADMIN — ASPIRIN 81 MG: 81 TABLET, CHEWABLE ORAL at 07:35

## 2017-05-20 RX ADMIN — CARVEDILOL PHOSPHATE 20 MG: 20 CAPSULE, EXTENDED RELEASE ORAL at 07:35

## 2017-05-20 RX ADMIN — INSULIN ASPART 7 UNITS: 100 INJECTION, SOLUTION INTRAVENOUS; SUBCUTANEOUS at 08:03

## 2017-05-20 RX ADMIN — INSULIN ASPART 7 UNITS: 100 INJECTION, SOLUTION INTRAVENOUS; SUBCUTANEOUS at 11:41

## 2017-05-20 RX ADMIN — FENOFIBRATE 145 MG: 145 TABLET ORAL at 07:34

## 2017-05-20 RX ADMIN — LEVETIRACETAM 1000 MG: 500 TABLET, EXTENDED RELEASE ORAL at 07:34

## 2017-05-20 RX ADMIN — AMITRIPTYLINE HYDROCHLORIDE 25 MG: 25 TABLET, FILM COATED ORAL at 21:03

## 2017-05-20 RX ADMIN — CLOPIDOGREL 150 MG: 75 TABLET, FILM COATED ORAL at 07:34

## 2017-05-20 RX ADMIN — LINAGLIPTIN 5 MG: 5 TABLET, FILM COATED ORAL at 07:34

## 2017-05-20 RX ADMIN — LISINOPRIL 40 MG: 40 TABLET ORAL at 07:33

## 2017-05-20 RX ADMIN — INSULIN ASPART 3 UNITS: 100 INJECTION, SOLUTION INTRAVENOUS; SUBCUTANEOUS at 22:39

## 2017-05-20 RX ADMIN — INSULIN ASPART 8 UNITS: 100 INJECTION, SOLUTION INTRAVENOUS; SUBCUTANEOUS at 11:41

## 2017-05-20 RX ADMIN — INSULIN DETEMIR 75 UNITS: 100 INJECTION, SOLUTION SUBCUTANEOUS at 21:03

## 2017-05-20 RX ADMIN — INSULIN ASPART 5 UNITS: 100 INJECTION, SOLUTION INTRAVENOUS; SUBCUTANEOUS at 16:58

## 2017-05-20 RX ADMIN — INSULIN ASPART 5 UNITS: 100 INJECTION, SOLUTION INTRAVENOUS; SUBCUTANEOUS at 08:04

## 2017-05-21 PROBLEM — E03.8 SUBCLINICAL HYPOTHYROIDISM: Status: ACTIVE | Noted: 2017-05-21

## 2017-05-21 PROBLEM — R80.9 MICROALBUMINURIA: Status: ACTIVE | Noted: 2017-05-21

## 2017-05-21 LAB
ALBUMIN UR-MCNC: 86.4 MG/L
ANION GAP SERPL CALCULATED.3IONS-SCNC: 12.4 MMOL/L
BUN BLD-MCNC: 21 MG/DL (ref 6–20)
BUN/CREAT SERPL: 12.4 (ref 7–25)
CALCIUM SPEC-SCNC: 9.3 MG/DL (ref 8.6–10.5)
CHLORIDE SERPL-SCNC: 105 MMOL/L (ref 98–107)
CO2 SERPL-SCNC: 24.6 MMOL/L (ref 22–29)
CREAT BLD-MCNC: 1.7 MG/DL (ref 0.76–1.27)
CREAT UR-MCNC: 80.7 MG/DL
GFR SERPL CREATININE-BSD FRML MDRD: 42 ML/MIN/1.73
GLUCOSE BLD-MCNC: 91 MG/DL (ref 65–99)
GLUCOSE BLDC GLUCOMTR-MCNC: 101 MG/DL (ref 70–130)
GLUCOSE BLDC GLUCOMTR-MCNC: 154 MG/DL (ref 70–130)
GLUCOSE BLDC GLUCOMTR-MCNC: 167 MG/DL (ref 70–130)
GLUCOSE BLDC GLUCOMTR-MCNC: 213 MG/DL (ref 70–130)
MICROALBUMIN/CREAT UR: 1070.6 MG/G
POTASSIUM BLD-SCNC: 4.6 MMOL/L (ref 3.5–5.2)
SODIUM BLD-SCNC: 142 MMOL/L (ref 136–145)

## 2017-05-21 PROCEDURE — 63710000001 INSULIN ASPART PER 5 UNITS: Performed by: INTERNAL MEDICINE

## 2017-05-21 PROCEDURE — 80048 BASIC METABOLIC PNL TOTAL CA: CPT | Performed by: INTERNAL MEDICINE

## 2017-05-21 PROCEDURE — 99232 SBSQ HOSP IP/OBS MODERATE 35: CPT | Performed by: INTERNAL MEDICINE

## 2017-05-21 PROCEDURE — 82043 UR ALBUMIN QUANTITATIVE: CPT | Performed by: INTERNAL MEDICINE

## 2017-05-21 PROCEDURE — 86376 MICROSOMAL ANTIBODY EACH: CPT | Performed by: INTERNAL MEDICINE

## 2017-05-21 PROCEDURE — 82962 GLUCOSE BLOOD TEST: CPT

## 2017-05-21 PROCEDURE — 82570 ASSAY OF URINE CREATININE: CPT | Performed by: INTERNAL MEDICINE

## 2017-05-21 PROCEDURE — 63710000001 INSULIN ASPART PER 5 UNITS: Performed by: PHYSICAL MEDICINE & REHABILITATION

## 2017-05-21 RX ADMIN — ASPIRIN 81 MG: 81 TABLET, CHEWABLE ORAL at 07:56

## 2017-05-21 RX ADMIN — INSULIN DETEMIR 75 UNITS: 100 INJECTION, SOLUTION SUBCUTANEOUS at 21:20

## 2017-05-21 RX ADMIN — LEVETIRACETAM 1000 MG: 500 TABLET, EXTENDED RELEASE ORAL at 07:56

## 2017-05-21 RX ADMIN — LISINOPRIL 40 MG: 40 TABLET ORAL at 07:56

## 2017-05-21 RX ADMIN — CLOPIDOGREL 150 MG: 75 TABLET, FILM COATED ORAL at 07:56

## 2017-05-21 RX ADMIN — FENOFIBRATE 145 MG: 145 TABLET ORAL at 07:56

## 2017-05-21 RX ADMIN — INSULIN ASPART 5 UNITS: 100 INJECTION, SOLUTION INTRAVENOUS; SUBCUTANEOUS at 21:54

## 2017-05-21 RX ADMIN — INSULIN ASPART 3 UNITS: 100 INJECTION, SOLUTION INTRAVENOUS; SUBCUTANEOUS at 11:54

## 2017-05-21 RX ADMIN — INSULIN ASPART 12 UNITS: 100 INJECTION, SOLUTION INTRAVENOUS; SUBCUTANEOUS at 07:56

## 2017-05-21 RX ADMIN — AMITRIPTYLINE HYDROCHLORIDE 25 MG: 25 TABLET, FILM COATED ORAL at 21:20

## 2017-05-21 RX ADMIN — ROSUVASTATIN CALCIUM 20 MG: 20 TABLET, FILM COATED ORAL at 21:20

## 2017-05-21 RX ADMIN — CARVEDILOL PHOSPHATE 20 MG: 20 CAPSULE, EXTENDED RELEASE ORAL at 07:56

## 2017-05-21 RX ADMIN — INSULIN ASPART 3 UNITS: 100 INJECTION, SOLUTION INTRAVENOUS; SUBCUTANEOUS at 16:46

## 2017-05-21 RX ADMIN — LINAGLIPTIN 5 MG: 5 TABLET, FILM COATED ORAL at 07:56

## 2017-05-21 RX ADMIN — INSULIN ASPART 12 UNITS: 100 INJECTION, SOLUTION INTRAVENOUS; SUBCUTANEOUS at 11:54

## 2017-05-21 RX ADMIN — AMLODIPINE BESYLATE 10 MG: 10 TABLET ORAL at 07:56

## 2017-05-21 RX ADMIN — INSULIN ASPART 12 UNITS: 100 INJECTION, SOLUTION INTRAVENOUS; SUBCUTANEOUS at 16:46

## 2017-05-22 LAB
ANION GAP SERPL CALCULATED.3IONS-SCNC: 12.4 MMOL/L
BUN BLD-MCNC: 23 MG/DL (ref 6–20)
BUN/CREAT SERPL: 13.9 (ref 7–25)
CALCIUM SPEC-SCNC: 8.9 MG/DL (ref 8.6–10.5)
CHLORIDE SERPL-SCNC: 101 MMOL/L (ref 98–107)
CO2 SERPL-SCNC: 22.6 MMOL/L (ref 22–29)
CREAT BLD-MCNC: 1.65 MG/DL (ref 0.76–1.27)
GFR SERPL CREATININE-BSD FRML MDRD: 43 ML/MIN/1.73
GLUCOSE BLD-MCNC: 183 MG/DL (ref 65–99)
GLUCOSE BLDC GLUCOMTR-MCNC: 129 MG/DL (ref 70–130)
GLUCOSE BLDC GLUCOMTR-MCNC: 172 MG/DL (ref 70–130)
GLUCOSE BLDC GLUCOMTR-MCNC: 210 MG/DL (ref 70–130)
GLUCOSE BLDC GLUCOMTR-MCNC: 247 MG/DL (ref 70–130)
POTASSIUM BLD-SCNC: 4.6 MMOL/L (ref 3.5–5.2)
SODIUM BLD-SCNC: 136 MMOL/L (ref 136–145)
THYROPEROXIDASE AB SERPL-ACNC: 47 IU/ML (ref 0–34)

## 2017-05-22 PROCEDURE — 97110 THERAPEUTIC EXERCISES: CPT

## 2017-05-22 PROCEDURE — 82570 ASSAY OF URINE CREATININE: CPT | Performed by: INTERNAL MEDICINE

## 2017-05-22 PROCEDURE — 84166 PROTEIN E-PHORESIS/URINE/CSF: CPT | Performed by: INTERNAL MEDICINE

## 2017-05-22 PROCEDURE — 97535 SELF CARE MNGMENT TRAINING: CPT

## 2017-05-22 PROCEDURE — 84165 PROTEIN E-PHORESIS SERUM: CPT | Performed by: INTERNAL MEDICINE

## 2017-05-22 PROCEDURE — 86335 IMMUNFIX E-PHORSIS/URINE/CSF: CPT | Performed by: INTERNAL MEDICINE

## 2017-05-22 PROCEDURE — 63710000001 INSULIN ASPART PER 5 UNITS: Performed by: INTERNAL MEDICINE

## 2017-05-22 PROCEDURE — 97112 NEUROMUSCULAR REEDUCATION: CPT

## 2017-05-22 PROCEDURE — 86334 IMMUNOFIX E-PHORESIS SERUM: CPT | Performed by: INTERNAL MEDICINE

## 2017-05-22 PROCEDURE — 82962 GLUCOSE BLOOD TEST: CPT

## 2017-05-22 PROCEDURE — 80048 BASIC METABOLIC PNL TOTAL CA: CPT | Performed by: INTERNAL MEDICINE

## 2017-05-22 PROCEDURE — 84155 ASSAY OF PROTEIN SERUM: CPT | Performed by: INTERNAL MEDICINE

## 2017-05-22 PROCEDURE — 97532: CPT

## 2017-05-22 PROCEDURE — 84156 ASSAY OF PROTEIN URINE: CPT | Performed by: INTERNAL MEDICINE

## 2017-05-22 PROCEDURE — 63710000001 INSULIN DETEMER PER 5 UNITS: Performed by: INTERNAL MEDICINE

## 2017-05-22 PROCEDURE — 63710000001 INSULIN ASPART PER 5 UNITS: Performed by: PHYSICAL MEDICINE & REHABILITATION

## 2017-05-22 PROCEDURE — 99233 SBSQ HOSP IP/OBS HIGH 50: CPT | Performed by: INTERNAL MEDICINE

## 2017-05-22 RX ADMIN — CARVEDILOL PHOSPHATE 20 MG: 20 CAPSULE, EXTENDED RELEASE ORAL at 08:45

## 2017-05-22 RX ADMIN — LISINOPRIL 40 MG: 40 TABLET ORAL at 08:45

## 2017-05-22 RX ADMIN — ROSUVASTATIN CALCIUM 20 MG: 20 TABLET, FILM COATED ORAL at 21:59

## 2017-05-22 RX ADMIN — INSULIN ASPART 12 UNITS: 100 INJECTION, SOLUTION INTRAVENOUS; SUBCUTANEOUS at 08:45

## 2017-05-22 RX ADMIN — INSULIN ASPART 5 UNITS: 100 INJECTION, SOLUTION INTRAVENOUS; SUBCUTANEOUS at 21:59

## 2017-05-22 RX ADMIN — LINAGLIPTIN 5 MG: 5 TABLET, FILM COATED ORAL at 08:45

## 2017-05-22 RX ADMIN — INSULIN ASPART 3 UNITS: 100 INJECTION, SOLUTION INTRAVENOUS; SUBCUTANEOUS at 08:46

## 2017-05-22 RX ADMIN — CLOPIDOGREL 150 MG: 75 TABLET, FILM COATED ORAL at 08:45

## 2017-05-22 RX ADMIN — INSULIN ASPART 12 UNITS: 100 INJECTION, SOLUTION INTRAVENOUS; SUBCUTANEOUS at 17:21

## 2017-05-22 RX ADMIN — INSULIN ASPART 12 UNITS: 100 INJECTION, SOLUTION INTRAVENOUS; SUBCUTANEOUS at 12:15

## 2017-05-22 RX ADMIN — AMITRIPTYLINE HYDROCHLORIDE 25 MG: 25 TABLET, FILM COATED ORAL at 21:59

## 2017-05-22 RX ADMIN — LEVETIRACETAM 1000 MG: 500 TABLET, EXTENDED RELEASE ORAL at 08:45

## 2017-05-22 RX ADMIN — ASPIRIN 81 MG: 81 TABLET, CHEWABLE ORAL at 08:45

## 2017-05-22 RX ADMIN — INSULIN DETEMIR 75 UNITS: 100 INJECTION, SOLUTION SUBCUTANEOUS at 22:17

## 2017-05-22 RX ADMIN — AMLODIPINE BESYLATE 10 MG: 10 TABLET ORAL at 08:45

## 2017-05-22 RX ADMIN — FENOFIBRATE 145 MG: 145 TABLET ORAL at 08:45

## 2017-05-22 RX ADMIN — INSULIN ASPART 5 UNITS: 100 INJECTION, SOLUTION INTRAVENOUS; SUBCUTANEOUS at 12:16

## 2017-05-23 LAB
ALBUMIN SERPL-MCNC: 3.2 G/DL (ref 2.9–4.4)
ALBUMIN/GLOB SERPL: 1.4 {RATIO} (ref 0.7–1.7)
ALPHA1 GLOB FLD ELPH-MCNC: 0.2 G/DL (ref 0–0.4)
ALPHA2 GLOB SERPL ELPH-MCNC: 0.8 G/DL (ref 0.4–1)
B-GLOBULIN SERPL ELPH-MCNC: 0.8 G/DL (ref 0.7–1.3)
GAMMA GLOB SERPL ELPH-MCNC: 0.5 G/DL (ref 0.4–1.8)
GLOBULIN SER CALC-MCNC: 2.3 G/DL (ref 2.2–3.9)
GLUCOSE BLDC GLUCOMTR-MCNC: 144 MG/DL (ref 70–130)
GLUCOSE BLDC GLUCOMTR-MCNC: 166 MG/DL (ref 70–130)
GLUCOSE BLDC GLUCOMTR-MCNC: 168 MG/DL (ref 70–130)
GLUCOSE BLDC GLUCOMTR-MCNC: 240 MG/DL (ref 70–130)
IGA SERPL-MCNC: 97 MG/DL (ref 90–386)
IGG SERPL-MCNC: 444 MG/DL (ref 700–1600)
IGM SERPL-MCNC: 30 MG/DL (ref 20–172)
INTERPRETATION SERPL IEP-IMP: ABNORMAL
Lab: ABNORMAL
M-SPIKE: ABNORMAL G/DL
PROT SERPL-MCNC: 5.5 G/DL (ref 6–8.5)

## 2017-05-23 PROCEDURE — 97532: CPT

## 2017-05-23 PROCEDURE — 97535 SELF CARE MNGMENT TRAINING: CPT

## 2017-05-23 PROCEDURE — 63710000001 INSULIN DETEMER PER 5 UNITS: Performed by: INTERNAL MEDICINE

## 2017-05-23 PROCEDURE — 63710000001 INSULIN ASPART PER 5 UNITS: Performed by: INTERNAL MEDICINE

## 2017-05-23 PROCEDURE — 97110 THERAPEUTIC EXERCISES: CPT

## 2017-05-23 PROCEDURE — 97112 NEUROMUSCULAR REEDUCATION: CPT

## 2017-05-23 PROCEDURE — 63710000001 INSULIN ASPART PER 5 UNITS: Performed by: PHYSICAL MEDICINE & REHABILITATION

## 2017-05-23 PROCEDURE — 99233 SBSQ HOSP IP/OBS HIGH 50: CPT | Performed by: INTERNAL MEDICINE

## 2017-05-23 PROCEDURE — 82962 GLUCOSE BLOOD TEST: CPT

## 2017-05-23 RX ADMIN — INSULIN ASPART 3 UNITS: 100 INJECTION, SOLUTION INTRAVENOUS; SUBCUTANEOUS at 17:08

## 2017-05-23 RX ADMIN — AMLODIPINE BESYLATE 10 MG: 10 TABLET ORAL at 08:17

## 2017-05-23 RX ADMIN — CLOPIDOGREL 150 MG: 75 TABLET, FILM COATED ORAL at 08:17

## 2017-05-23 RX ADMIN — INSULIN DETEMIR 75 UNITS: 100 INJECTION, SOLUTION SUBCUTANEOUS at 21:40

## 2017-05-23 RX ADMIN — FENOFIBRATE 145 MG: 145 TABLET ORAL at 08:17

## 2017-05-23 RX ADMIN — INSULIN ASPART 12 UNITS: 100 INJECTION, SOLUTION INTRAVENOUS; SUBCUTANEOUS at 17:08

## 2017-05-23 RX ADMIN — LISINOPRIL 40 MG: 40 TABLET ORAL at 08:17

## 2017-05-23 RX ADMIN — INSULIN ASPART 12 UNITS: 100 INJECTION, SOLUTION INTRAVENOUS; SUBCUTANEOUS at 11:45

## 2017-05-23 RX ADMIN — INSULIN ASPART 12 UNITS: 100 INJECTION, SOLUTION INTRAVENOUS; SUBCUTANEOUS at 08:13

## 2017-05-23 RX ADMIN — AMITRIPTYLINE HYDROCHLORIDE 25 MG: 25 TABLET, FILM COATED ORAL at 21:40

## 2017-05-23 RX ADMIN — LINAGLIPTIN 5 MG: 5 TABLET, FILM COATED ORAL at 08:17

## 2017-05-23 RX ADMIN — INSULIN ASPART 5 UNITS: 100 INJECTION, SOLUTION INTRAVENOUS; SUBCUTANEOUS at 21:40

## 2017-05-23 RX ADMIN — LEVETIRACETAM 1000 MG: 500 TABLET, EXTENDED RELEASE ORAL at 08:17

## 2017-05-23 RX ADMIN — CARVEDILOL PHOSPHATE 20 MG: 20 CAPSULE, EXTENDED RELEASE ORAL at 08:17

## 2017-05-23 RX ADMIN — ASPIRIN 81 MG: 81 TABLET, CHEWABLE ORAL at 08:17

## 2017-05-23 RX ADMIN — ROSUVASTATIN CALCIUM 20 MG: 20 TABLET, FILM COATED ORAL at 21:40

## 2017-05-23 RX ADMIN — INSULIN ASPART 3 UNITS: 100 INJECTION, SOLUTION INTRAVENOUS; SUBCUTANEOUS at 11:44

## 2017-05-24 LAB
ALBUMIN 24H MFR UR ELPH: 82.5 %
ALPHA1 GLOB 24H MFR UR ELPH: 0.7 %
ALPHA2 GLOB 24H MFR UR ELPH: 3 %
ASA PLATELET INHIBITION: 543 ARU
B-GLOBULIN MFR UR ELPH: 10.4 %
CREAT 24H UR-MCNC: 98 MG/DL
CREAT 24H UR-MRATE: 1348 MG/24 HR (ref 1000–2000)
GAMMA GLOB 24H MFR UR ELPH: 3.3 %
GLUCOSE BLDC GLUCOMTR-MCNC: 149 MG/DL (ref 70–130)
GLUCOSE BLDC GLUCOMTR-MCNC: 192 MG/DL (ref 70–130)
GLUCOSE BLDC GLUCOMTR-MCNC: 215 MG/DL (ref 70–130)
GLUCOSE BLDC GLUCOMTR-MCNC: 217 MG/DL (ref 70–130)
HIV 1 & 2 AB SER-IMP: NORMAL
INTERPRETATION UR IFE-IMP: NORMAL
M PROTEIN 24H MFR UR ELPH: NORMAL %
PA ADP PRP-ACNC: 300 PRU (ref 194–418)
PROT UR-MCNC: 144.2 MG/DL

## 2017-05-24 PROCEDURE — 85576 BLOOD PLATELET AGGREGATION: CPT | Performed by: PHYSICAL MEDICINE & REHABILITATION

## 2017-05-24 PROCEDURE — 97110 THERAPEUTIC EXERCISES: CPT

## 2017-05-24 PROCEDURE — 63710000001 INSULIN ASPART PER 5 UNITS: Performed by: INTERNAL MEDICINE

## 2017-05-24 PROCEDURE — 97535 SELF CARE MNGMENT TRAINING: CPT

## 2017-05-24 PROCEDURE — 82962 GLUCOSE BLOOD TEST: CPT

## 2017-05-24 PROCEDURE — 63710000001 INSULIN ASPART PER 5 UNITS: Performed by: PHYSICAL MEDICINE & REHABILITATION

## 2017-05-24 PROCEDURE — 97532: CPT

## 2017-05-24 PROCEDURE — 99233 SBSQ HOSP IP/OBS HIGH 50: CPT | Performed by: INTERNAL MEDICINE

## 2017-05-24 RX ADMIN — ASPIRIN 81 MG: 81 TABLET, CHEWABLE ORAL at 08:08

## 2017-05-24 RX ADMIN — INSULIN ASPART 14 UNITS: 100 INJECTION, SOLUTION INTRAVENOUS; SUBCUTANEOUS at 17:28

## 2017-05-24 RX ADMIN — CARVEDILOL PHOSPHATE 20 MG: 20 CAPSULE, EXTENDED RELEASE ORAL at 08:08

## 2017-05-24 RX ADMIN — INSULIN ASPART 3 UNITS: 100 INJECTION, SOLUTION INTRAVENOUS; SUBCUTANEOUS at 11:45

## 2017-05-24 RX ADMIN — AMLODIPINE BESYLATE 10 MG: 10 TABLET ORAL at 08:08

## 2017-05-24 RX ADMIN — LISINOPRIL 40 MG: 40 TABLET ORAL at 08:08

## 2017-05-24 RX ADMIN — LINAGLIPTIN 5 MG: 5 TABLET, FILM COATED ORAL at 08:08

## 2017-05-24 RX ADMIN — INSULIN ASPART 12 UNITS: 100 INJECTION, SOLUTION INTRAVENOUS; SUBCUTANEOUS at 08:07

## 2017-05-24 RX ADMIN — ROSUVASTATIN CALCIUM 20 MG: 20 TABLET, FILM COATED ORAL at 20:49

## 2017-05-24 RX ADMIN — INSULIN ASPART 12 UNITS: 100 INJECTION, SOLUTION INTRAVENOUS; SUBCUTANEOUS at 11:45

## 2017-05-24 RX ADMIN — LEVETIRACETAM 1000 MG: 500 TABLET, EXTENDED RELEASE ORAL at 08:08

## 2017-05-24 RX ADMIN — INSULIN DETEMIR 75 UNITS: 100 INJECTION, SOLUTION SUBCUTANEOUS at 20:49

## 2017-05-24 RX ADMIN — INSULIN ASPART 5 UNITS: 100 INJECTION, SOLUTION INTRAVENOUS; SUBCUTANEOUS at 20:58

## 2017-05-24 RX ADMIN — FENOFIBRATE 145 MG: 145 TABLET ORAL at 08:08

## 2017-05-24 RX ADMIN — INSULIN ASPART 5 UNITS: 100 INJECTION, SOLUTION INTRAVENOUS; SUBCUTANEOUS at 17:29

## 2017-05-24 RX ADMIN — AMITRIPTYLINE HYDROCHLORIDE 25 MG: 25 TABLET, FILM COATED ORAL at 20:49

## 2017-05-24 RX ADMIN — CLOPIDOGREL 150 MG: 75 TABLET, FILM COATED ORAL at 08:08

## 2017-05-25 LAB
GLUCOSE BLDC GLUCOMTR-MCNC: 107 MG/DL (ref 70–130)
GLUCOSE BLDC GLUCOMTR-MCNC: 170 MG/DL (ref 70–130)
GLUCOSE BLDC GLUCOMTR-MCNC: 219 MG/DL (ref 70–130)
GLUCOSE BLDC GLUCOMTR-MCNC: 81 MG/DL (ref 70–130)

## 2017-05-25 PROCEDURE — 97110 THERAPEUTIC EXERCISES: CPT

## 2017-05-25 PROCEDURE — 63710000001 INSULIN ASPART PER 5 UNITS: Performed by: INTERNAL MEDICINE

## 2017-05-25 PROCEDURE — 63710000001 INSULIN ASPART PER 5 UNITS: Performed by: PHYSICAL MEDICINE & REHABILITATION

## 2017-05-25 PROCEDURE — 97532: CPT

## 2017-05-25 PROCEDURE — 97535 SELF CARE MNGMENT TRAINING: CPT

## 2017-05-25 PROCEDURE — 82962 GLUCOSE BLOOD TEST: CPT

## 2017-05-25 PROCEDURE — 99233 SBSQ HOSP IP/OBS HIGH 50: CPT | Performed by: INTERNAL MEDICINE

## 2017-05-25 RX ORDER — LEVETIRACETAM 500 MG/1
1000 TABLET, EXTENDED RELEASE ORAL DAILY
Start: 2017-05-25 | End: 2019-05-03

## 2017-05-25 RX ORDER — ATORVASTATIN CALCIUM 20 MG/1
20 TABLET, FILM COATED ORAL DAILY
Qty: 30 TABLET | Refills: 3 | Status: SHIPPED | OUTPATIENT
Start: 2017-05-25 | End: 2018-01-29 | Stop reason: SDUPTHER

## 2017-05-25 RX ORDER — CLOPIDOGREL BISULFATE 75 MG/1
150 TABLET ORAL DAILY
Qty: 60 TABLET | Refills: 5 | Status: SHIPPED | OUTPATIENT
Start: 2017-05-25 | End: 2018-08-07

## 2017-05-25 RX ORDER — LANOLIN ALCOHOL/MO/W.PET/CERES
3 CREAM (GRAM) TOPICAL NIGHTLY PRN
Start: 2017-05-25 | End: 2019-02-13

## 2017-05-25 RX ORDER — AMLODIPINE BESYLATE 10 MG/1
10 TABLET ORAL
Qty: 30 TABLET | Refills: 1 | Status: SHIPPED | OUTPATIENT
Start: 2017-05-25

## 2017-05-25 RX ORDER — LISINOPRIL 40 MG/1
40 TABLET ORAL DAILY
Qty: 30 TABLET | Refills: 1 | Status: SHIPPED | OUTPATIENT
Start: 2017-05-25

## 2017-05-25 RX ORDER — ERGOCALCIFEROL 1.25 MG/1
50000 CAPSULE ORAL
Qty: 5 CAPSULE | Refills: 0 | Status: SHIPPED | OUTPATIENT
Start: 2017-05-25 | End: 2017-06-23

## 2017-05-25 RX ORDER — ASPIRIN 81 MG/1
81 TABLET, CHEWABLE ORAL DAILY
Qty: 30 TABLET | Refills: 5 | Status: SHIPPED | OUTPATIENT
Start: 2017-05-25 | End: 2019-05-03

## 2017-05-25 RX ADMIN — INSULIN ASPART 14 UNITS: 100 INJECTION, SOLUTION INTRAVENOUS; SUBCUTANEOUS at 17:17

## 2017-05-25 RX ADMIN — ROSUVASTATIN CALCIUM 20 MG: 20 TABLET, FILM COATED ORAL at 22:24

## 2017-05-25 RX ADMIN — LINAGLIPTIN 5 MG: 5 TABLET, FILM COATED ORAL at 08:06

## 2017-05-25 RX ADMIN — ASPIRIN 81 MG: 81 TABLET, CHEWABLE ORAL at 08:06

## 2017-05-25 RX ADMIN — INSULIN ASPART 14 UNITS: 100 INJECTION, SOLUTION INTRAVENOUS; SUBCUTANEOUS at 08:07

## 2017-05-25 RX ADMIN — CLOPIDOGREL 150 MG: 75 TABLET, FILM COATED ORAL at 08:06

## 2017-05-25 RX ADMIN — INSULIN ASPART 3 UNITS: 100 INJECTION, SOLUTION INTRAVENOUS; SUBCUTANEOUS at 11:49

## 2017-05-25 RX ADMIN — AMLODIPINE BESYLATE 10 MG: 10 TABLET ORAL at 08:06

## 2017-05-25 RX ADMIN — AMITRIPTYLINE HYDROCHLORIDE 25 MG: 25 TABLET, FILM COATED ORAL at 22:24

## 2017-05-25 RX ADMIN — INSULIN DETEMIR 75 UNITS: 100 INJECTION, SOLUTION SUBCUTANEOUS at 22:26

## 2017-05-25 RX ADMIN — CARVEDILOL PHOSPHATE 20 MG: 20 CAPSULE, EXTENDED RELEASE ORAL at 08:06

## 2017-05-25 RX ADMIN — INSULIN ASPART 14 UNITS: 100 INJECTION, SOLUTION INTRAVENOUS; SUBCUTANEOUS at 11:49

## 2017-05-25 RX ADMIN — INSULIN ASPART 5 UNITS: 100 INJECTION, SOLUTION INTRAVENOUS; SUBCUTANEOUS at 22:33

## 2017-05-25 RX ADMIN — LEVETIRACETAM 1000 MG: 500 TABLET, EXTENDED RELEASE ORAL at 08:06

## 2017-05-25 RX ADMIN — LISINOPRIL 40 MG: 40 TABLET ORAL at 08:06

## 2017-05-25 RX ADMIN — FENOFIBRATE 145 MG: 145 TABLET ORAL at 08:06

## 2017-05-26 VITALS
BODY MASS INDEX: 32.78 KG/M2 | OXYGEN SATURATION: 98 % | HEART RATE: 71 BPM | HEIGHT: 66 IN | TEMPERATURE: 97.7 F | RESPIRATION RATE: 18 BRPM | SYSTOLIC BLOOD PRESSURE: 172 MMHG | WEIGHT: 204 LBS | DIASTOLIC BLOOD PRESSURE: 89 MMHG

## 2017-05-26 LAB
GLUCOSE BLDC GLUCOMTR-MCNC: 122 MG/DL (ref 70–130)
GLUCOSE BLDC GLUCOMTR-MCNC: 136 MG/DL (ref 70–130)

## 2017-05-26 PROCEDURE — 63710000001 INSULIN ASPART PER 5 UNITS: Performed by: INTERNAL MEDICINE

## 2017-05-26 PROCEDURE — 82962 GLUCOSE BLOOD TEST: CPT

## 2017-05-26 RX ADMIN — ASPIRIN 81 MG: 81 TABLET, CHEWABLE ORAL at 08:00

## 2017-05-26 RX ADMIN — CLOPIDOGREL 150 MG: 75 TABLET, FILM COATED ORAL at 08:00

## 2017-05-26 RX ADMIN — FENOFIBRATE 145 MG: 145 TABLET ORAL at 08:00

## 2017-05-26 RX ADMIN — LISINOPRIL 40 MG: 40 TABLET ORAL at 07:59

## 2017-05-26 RX ADMIN — ERGOCALCIFEROL 50000 UNITS: 1.25 CAPSULE ORAL at 07:59

## 2017-05-26 RX ADMIN — LEVETIRACETAM 1000 MG: 500 TABLET, EXTENDED RELEASE ORAL at 07:58

## 2017-05-26 RX ADMIN — LINAGLIPTIN 5 MG: 5 TABLET, FILM COATED ORAL at 08:00

## 2017-05-26 RX ADMIN — AMLODIPINE BESYLATE 10 MG: 10 TABLET ORAL at 08:00

## 2017-05-26 RX ADMIN — CARVEDILOL PHOSPHATE 20 MG: 20 CAPSULE, EXTENDED RELEASE ORAL at 07:59

## 2017-05-26 RX ADMIN — INSULIN ASPART 14 UNITS: 100 INJECTION, SOLUTION INTRAVENOUS; SUBCUTANEOUS at 07:57

## 2017-05-26 RX ADMIN — INSULIN ASPART 14 UNITS: 100 INJECTION, SOLUTION INTRAVENOUS; SUBCUTANEOUS at 12:08

## 2017-06-06 RX ORDER — INSULIN GLARGINE 100 [IU]/ML
INJECTION, SOLUTION SUBCUTANEOUS
Qty: 10 PEN | Refills: 3 | Status: SHIPPED | OUTPATIENT
Start: 2017-06-06 | End: 2018-01-29

## 2017-06-06 NOTE — TELEPHONE ENCOUNTER
----- Message from Ellie Kraus sent at 6/6/2017 10:09 AM EDT -----  Contact: patient  graham sister  [phone]  Medicare will not cover toujeo    Pt needs new insulin  Can josé manuel prescribe    Pt is just about out  Has enough to get thru tomorrow morning      Pre Dr Carrasco      Insulin was changed to Baseaglar  80 units nights and sent to patient pharmacy

## 2017-06-16 ENCOUNTER — OFFICE VISIT (OUTPATIENT)
Dept: ENDOCRINOLOGY | Age: 57
End: 2017-06-16

## 2017-06-16 VITALS
DIASTOLIC BLOOD PRESSURE: 68 MMHG | HEART RATE: 73 BPM | SYSTOLIC BLOOD PRESSURE: 130 MMHG | OXYGEN SATURATION: 98 % | HEIGHT: 67 IN | BODY MASS INDEX: 32.96 KG/M2 | WEIGHT: 210 LBS

## 2017-06-16 DIAGNOSIS — E78.5 HYPERLIPIDEMIA, UNSPECIFIED HYPERLIPIDEMIA TYPE: ICD-10-CM

## 2017-06-16 DIAGNOSIS — Z79.4 TYPE 2 DIABETES MELLITUS WITH COMPLICATION, WITH LONG-TERM CURRENT USE OF INSULIN (HCC): Primary | ICD-10-CM

## 2017-06-16 DIAGNOSIS — E03.8 SUBCLINICAL HYPOTHYROIDISM: ICD-10-CM

## 2017-06-16 DIAGNOSIS — E11.8 TYPE 2 DIABETES MELLITUS WITH COMPLICATION, WITH LONG-TERM CURRENT USE OF INSULIN (HCC): Primary | ICD-10-CM

## 2017-06-16 PROCEDURE — 99214 OFFICE O/P EST MOD 30 MIN: CPT | Performed by: INTERNAL MEDICINE

## 2017-06-16 NOTE — PROGRESS NOTES
57 y.o.    Patient Care Team:  SHOBHA Foley as PCP - General (Family Medicine)    Chief Complaint:    Hospital Follow up/  Type 2 Diabetes Mellitus  Subjective     HPI  Rocky Logan,57 y.o. WM is here as a follow up for Type 2 dm. patient was seen by me while he was hospitalized in rehabilitation after cerebrovascular accident.    Type 2 dm - Diagnosed in 2000. Was started on oral agents initially. Insulin was started in 2008. Currently on Basaglar 80 units at bed time, humalog 14 units with each meal plus the sliding scale - 2 units for 50 above 150 mg/dl, hold humalog when BG less than 100 mg/dl. Also Tradjenta 5 mg po daily.   Checks BG 3 - 4 times a day.   FBG -  70 - 120 mg/dl and Pre - meals - 200 - 250    Mg/dl  No increased urination or increased thirst. Few BG less than 60 mg/dl in the last one month, does have hypoglycemic awareness. Highest BG in the last 1 month was -  Mg/dl.  Pt got his log book for me. No nausea and vomiting.   Due for eye exam and no dm retinopathy. 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    C/o tingling or numbness in his b/l feet, no ulcers and amputations of his feet.   Hx of CAD s/p CABG in 2008, no hx of CKD,hx of CVA with left sided weakness.   Pt is trying to be physically active. Weight has been stable.   Pt tries to follow DM diet for most part, did have diabetic education during admission.   On Ace inb.    HLP - Lipitor 20 mg po daily.     Interval History      The following portions of the patient's history were reviewed and updated as appropriate: allergies, current medications, past family history, past medical history, past social history, past surgical history and problem list.    Past Medical History:   Diagnosis Date   • Diabetes mellitus    • Hypertension    • Stroke      Family History   Problem Relation Age of Onset   • Heart disease Mother    • Diabetes Father      Social History     Social History   • Marital status: Single     Spouse name: N/A   •  Number of children: N/A   • Years of education: N/A     Occupational History   • Not on file.     Social History Main Topics   • Smoking status: Never Smoker   • Smokeless tobacco: Not on file   • Alcohol use 1.8 oz/week     3 Glasses of wine per week   • Drug use: No   • Sexual activity: Defer     Other Topics Concern   • Not on file     Social History Narrative     Allergies   Allergen Reactions   • Sulfa Antibiotics Rash       Current Outpatient Prescriptions:   •  amitriptyline (ELAVIL) 25 MG tablet, Take 25 mg by mouth Every Night., Disp: , Rfl:   •  amLODIPine (NORVASC) 10 MG tablet, Take 1 tablet by mouth Daily., Disp: 30 tablet, Rfl: 1  •  aspirin 81 MG chewable tablet, Chew 1 tablet Daily., Disp: 30 tablet, Rfl: 5  •  atorvastatin (LIPITOR) 20 MG tablet, Take 1 tablet by mouth Daily., Disp: 30 tablet, Rfl: 3  •  carvedilol (COREG) 25 MG tablet, Take 20 mg by mouth Daily., Disp: , Rfl:   •  clopidogrel (PLAVIX) 75 MG tablet, Take 2 tablets by mouth Daily., Disp: 60 tablet, Rfl: 5  •  fenofibrate (TRICOR) 145 MG tablet, Take 145 mg by mouth Daily., Disp: , Rfl:   •  insulin aspart (novoLOG) 100 UNIT/ML injection, Inject 14 Units under the skin 3 (Three) Times a Day With Meals., Disp: 1 each, Rfl: 1  •  Insulin Glargine (BASAGLAR KWIKPEN) 100 UNIT/ML injection pen, Inject 80 units under the skin Nightly, Disp: 10 pen, Rfl: 3  •  levETIRAcetam XR (KEPPRA XR) 500 MG 24 hr tablet, Take 2 tablets by mouth Daily., Disp: , Rfl:   •  linagliptin (TRADJENTA) 5 MG tablet tablet, Take 5 mg by mouth Daily., Disp: , Rfl:   •  lisinopril (PRINIVIL,ZESTRIL) 40 MG tablet, Take 1 tablet by mouth Daily., Disp: 30 tablet, Rfl: 1  •  melatonin 3 MG tablet, Take 1 tablet by mouth At Night As Needed for Sleep., Disp: , Rfl:   •  vitamin D (ERGOCALCIFEROL) 30242 UNITS capsule capsule, Take 1 capsule by mouth Every 7 (Seven) Days for 5 doses. Then change to cholecalciferol 1,000 units po bid, Disp: 5 capsule, Rfl: 0        Review of  "Systems   Constitutional: Negative for fever.   HENT: Negative for facial swelling, nosebleeds, trouble swallowing and voice change.    Eyes: Negative for pain and redness.   Respiratory: Negative for shortness of breath and wheezing.    Cardiovascular: Negative for palpitations and leg swelling.   Gastrointestinal: Negative for abdominal pain, diarrhea and vomiting.   Endocrine: Negative for polydipsia and polyuria.   Genitourinary: Negative for decreased urine volume and frequency.   Musculoskeletal: Negative for joint swelling and neck pain.   Skin: Negative for rash.   Allergic/Immunologic: Negative for immunocompromised state.   Neurological: Negative for seizures and facial asymmetry.   Hematological: Does not bruise/bleed easily.   Psychiatric/Behavioral: Negative for agitation and confusion.       Objective       Vitals:    06/16/17 1352   BP: 130/68   Pulse: 73   SpO2: 98%   Weight: 210 lb (95.3 kg)   Height: 66.5\" (168.9 cm)     Body mass index is 33.39 kg/(m^2).      Physical Exam   Constitutional: He is oriented to person, place, and time. He appears well-nourished.   HENT:   Head: Normocephalic and atraumatic.   Surgical scar noted on the frontal area   Eyes: Conjunctivae and EOM are normal.   Neck: Normal range of motion. Neck supple. Carotid bruit is not present. No thyromegaly present.   Cardiovascular: Normal rate and normal heart sounds.    Heart rate-73, CABG scar noted   Pulmonary/Chest: Effort normal and breath sounds normal. No stridor. No respiratory distress. He has no wheezes.   Abdominal: Soft. Bowel sounds are normal. He exhibits no distension. There is no tenderness.   Musculoskeletal: He exhibits no edema or tenderness.   Decreased pin prick and intact proprioception   Lymphadenopathy:     He has no cervical adenopathy.   Neurological: He is alert and oriented to person, place, and time. He displays abnormal reflex. He exhibits abnormal muscle tone.   Left-sided weakness   Skin: Skin is " warm and dry.   Psychiatric: He has a normal mood and affect. His behavior is normal.   Vitals reviewed.    Results Review:     I reviewed the patient's new clinical results.    Medical records reviewed  Summary: done      Admission on 05/11/2017, Discharged on 05/26/2017   No results displayed because visit has over 200 results.        No results found for: HGBA1C  Lab Results   Component Value Date    MICROALBUR 86.4 05/21/2017    CREATININE 1.65 (H) 05/22/2017     Imaging Results (most recent)     None                Assessment and Plan:    Rocky was seen today for diabetes.    Diagnoses and all orders for this visit:    Type 2 diabetes mellitus with complication, with long-term current use of insulin    Subclinical hypothyroidism    Hyperlipidemia, unspecified hyperlipidemia type    Type 2 diabetes mellitus with diabetic neuropathy-uncontrolled  Will decrease basaglar to 70 units at bedtime  Decrease Humalog to 12 units with each meal,Will give 6 units if BG is less than 100 and hold if BG is less than 80.  Continue Humalog sliding scale 3 times a day before meals-2 units for 50 about 1 50 mg/dL.  Continue Tradjenta 5 mg oral daily.  Advised patient to follow-up with his ophthalmologist for routine diabetic retinopathy screening.    Hyperlipidemia  Continue Lipitor 20 mg oral daily.   Continue fenofibrate 145 mg oral daily.  Will check lipid panel prior to next visit.    Vitamin D deficiency  Will start patient on 50,000 units ergocalciferol Q weekly.    Subclinical hypothyroidism  Will check TSH prior to next visit and consider starting patient on levothyroxin.    13 minutes out of 25 minutes face to face spent in counseling the patient extensively on insulin regimen changes and treatment plan

## 2017-06-16 NOTE — PATIENT INSTRUCTIONS
Basaglar 70 units at bed time     Humalog 12 units three times a day with meals.   Give 6 units humalog if BG is less than 100 mg/dl, hold humalog if BG is less than 80 mg/dl.   Humalog sliding scale three times a day with meals.   BG less than 150 - zero  151 - 200 - 2 unit  201 - 250 - 4 units  251 - 300 - 6 units  301 - 350 - 8 units  Above 350 mg/dl - 10 units.     Tradjenta 5 mg po daily.

## 2017-10-24 ENCOUNTER — OFFICE VISIT (OUTPATIENT)
Dept: ENDOCRINOLOGY | Age: 57
End: 2017-10-24

## 2017-10-24 VITALS
OXYGEN SATURATION: 98 % | HEIGHT: 67 IN | BODY MASS INDEX: 33.27 KG/M2 | WEIGHT: 212 LBS | SYSTOLIC BLOOD PRESSURE: 130 MMHG | HEART RATE: 69 BPM | DIASTOLIC BLOOD PRESSURE: 70 MMHG

## 2017-10-24 DIAGNOSIS — E55.9 VITAMIN D DEFICIENCY: ICD-10-CM

## 2017-10-24 DIAGNOSIS — Z79.4 TYPE 2 DIABETES MELLITUS WITH COMPLICATION, WITH LONG-TERM CURRENT USE OF INSULIN (HCC): ICD-10-CM

## 2017-10-24 DIAGNOSIS — E78.5 HYPERLIPIDEMIA, UNSPECIFIED HYPERLIPIDEMIA TYPE: ICD-10-CM

## 2017-10-24 DIAGNOSIS — N18.30 CHRONIC KIDNEY DISEASE, STAGE III (MODERATE) (HCC): Primary | ICD-10-CM

## 2017-10-24 DIAGNOSIS — E11.8 TYPE 2 DIABETES MELLITUS WITH COMPLICATION, WITH LONG-TERM CURRENT USE OF INSULIN (HCC): ICD-10-CM

## 2017-10-24 PROCEDURE — 99214 OFFICE O/P EST MOD 30 MIN: CPT | Performed by: INTERNAL MEDICINE

## 2017-10-24 PROCEDURE — 95250 CONT GLUC MNTR PHYS/QHP EQP: CPT | Performed by: INTERNAL MEDICINE

## 2017-10-24 RX ORDER — FENOFIBRATE 48 MG/1
48 TABLET, COATED ORAL DAILY
Qty: 30 TABLET | Refills: 11 | Status: SHIPPED | OUTPATIENT
Start: 2017-10-24 | End: 2018-02-26 | Stop reason: SDUPTHER

## 2017-10-24 NOTE — PROGRESS NOTES
57 y.o.    Patient Care Team:  SHOBHA Foley as PCP - General (Family Medicine)    Chief Complaint:    3 Month Follow Up/ Type 2 Diabetes Mellitus  Subjective     HPI  Rocky Logan,57 y.o. WM is here as a follow up for Type 2 dm. patient was seen by me while he was hospitalized in rehabilitation after cerebrovascular accident.     Type 2 dm - Diagnosed in 2000. Was started on oral agents initially. Insulin was started in 2008.   Currently on Basaglar 70 units at bed time, Humalog 12 units with each meal moderate sliding scale - 2 units for 50 above 150 mg/dl, hold humalog when BG less than 80 mg/dl, 6 units if BG is less than 100 mg/dl, also on Tradjenta 5 mg po daily.   Checks BG 3 - 4 times a day.   Fasting blood kgcgkx- mg/dL and pre-meal blood sugars-160-1 80 mg/dL.  No increased urination or thirst.  Patient did have few blood sugars at around 50 mg/dL in the last 1 month especially in the middle of the night, does have hypoglycemic awareness.  Highest blood sugar in the last 1 month was 2 25 mg/dL.  He got his glucometer for me to review.  No nausea or vomiting.  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   C/o tingling or numbness in his b/l feet, no ulcers and amputations of his feet.   Hx of CAD s/p CABG in 2008, hx of CKD states to being followed by nephrologist,hx of CVA with left sided weakness.   Pt is trying to be physically active. Weight has been stable.   Pt tries to follow DM diet for most part, did have diabetic education during hospital admission.   On Ace inb.    [id number]-7.1%     HLP - Lipitor 20 mg po daily.     Interval History      The following portions of the patient's history were reviewed and updated as appropriate: allergies, current medications, past family history, past medical history, past social history, past surgical history and problem list.    Past Medical History:   Diagnosis Date   • Diabetes  mellitus    • Hypertension    • Stroke      Family History   Problem Relation Age of Onset   • Heart disease Mother    • Diabetes Father      Social History     Social History   • Marital status: Single     Spouse name: N/A   • Number of children: N/A   • Years of education: N/A     Occupational History   • Not on file.     Social History Main Topics   • Smoking status: Never Smoker   • Smokeless tobacco: Not on file   • Alcohol use 1.8 oz/week     3 Glasses of wine per week   • Drug use: No   • Sexual activity: Defer     Other Topics Concern   • Not on file     Social History Narrative     Allergies   Allergen Reactions   • Sulfa Antibiotics Rash       Current Outpatient Prescriptions:   •  amLODIPine (NORVASC) 10 MG tablet, Take 1 tablet by mouth Daily., Disp: 30 tablet, Rfl: 1  •  aspirin 81 MG chewable tablet, Chew 1 tablet Daily., Disp: 30 tablet, Rfl: 5  •  atorvastatin (LIPITOR) 20 MG tablet, Take 1 tablet by mouth Daily. (Patient taking differently: Take 40 mg by mouth Daily.), Disp: 30 tablet, Rfl: 3  •  carvedilol (COREG) 25 MG tablet, Take 20 mg by mouth Daily., Disp: , Rfl:   •  clopidogrel (PLAVIX) 75 MG tablet, Take 2 tablets by mouth Daily., Disp: 60 tablet, Rfl: 5  •  fenofibrate (TRICOR) 145 MG tablet, Take 145 mg by mouth Daily., Disp: , Rfl:   •  glucose blood (FREESTYLE LITE) test strip, Use to Test Blood Sugar 4 Times daily, Disp: 200 each, Rfl: 11  •  Insulin Glargine (BASAGLAR KWIKPEN) 100 UNIT/ML injection pen, Inject 80 units under the skin Nightly, Disp: 10 pen, Rfl: 3  •  Insulin Lispro (HUMALOG KWIKPEN) 100 UNIT/ML solution pen-injector, Inject 12 Units under the skin 3 (Three) Times a Day., Disp: , Rfl:   •  levETIRAcetam XR (KEPPRA XR) 500 MG 24 hr tablet, Take 2 tablets by mouth Daily., Disp: , Rfl:   •  linagliptin (TRADJENTA) 5 MG tablet tablet, Take 5 mg by mouth Daily., Disp: , Rfl:   •  lisinopril (PRINIVIL,ZESTRIL) 40 MG tablet, Take 1 tablet by mouth Daily., Disp: 30 tablet,  "Rfl: 1  •  melatonin 3 MG tablet, Take 1 tablet by mouth At Night As Needed for Sleep., Disp: , Rfl:   •  amitriptyline (ELAVIL) 25 MG tablet, Take 25 mg by mouth Every Night., Disp: , Rfl:         Review of Systems   Constitutional: Negative for fever.   HENT: Negative for facial swelling, nosebleeds, trouble swallowing and voice change.    Eyes: Positive for visual disturbance (Fluide in eye). Negative for pain and redness.   Respiratory: Negative for shortness of breath and wheezing.    Cardiovascular: Negative for palpitations and leg swelling.   Gastrointestinal: Negative for abdominal pain, diarrhea and vomiting.   Endocrine: Negative for polydipsia and polyuria.   Genitourinary: Positive for frequency. Negative for decreased urine volume.   Musculoskeletal: Negative for joint swelling and neck pain.   Skin: Negative for rash.   Allergic/Immunologic: Negative for immunocompromised state.   Neurological: Negative for seizures and facial asymmetry.   Hematological: Bruises/bleeds easily.   Psychiatric/Behavioral: Negative for agitation and confusion.       Objective       Vitals:    10/24/17 1053   BP: 130/70   Pulse: 69   SpO2: 98%   Weight: 212 lb (96.2 kg)   Height: 66.5\" (168.9 cm)     Body mass index is 33.71 kg/(m^2).      Physical Exam   Constitutional: He is oriented to person, place, and time. He appears well-nourished.   HENT:   Head: Normocephalic and atraumatic.   Scar of the head   Eyes: Conjunctivae and EOM are normal.   Neck: Normal range of motion. Neck supple. Carotid bruit is not present. No thyromegaly present.   Wide neck   Cardiovascular: Normal rate and normal heart sounds.    HR - 69   Pulmonary/Chest: Effort normal and breath sounds normal. No stridor. No respiratory distress. He has no wheezes.   Abdominal: Soft. Bowel sounds are normal. He exhibits no distension. There is no tenderness.   Central obesity   Musculoskeletal: He exhibits no edema or tenderness.   Lymphadenopathy:     He " has no cervical adenopathy.   Neurological: He is alert and oriented to person, place, and time. He displays abnormal reflex. He exhibits abnormal muscle tone.   Left sided weakness   Skin: Skin is warm and dry.   Psychiatric: He has a normal mood and affect. His behavior is normal.   Vitals reviewed.    Results Review:     I reviewed the patient's new clinical results.    Medical records reviewed  Summary: done      Admission on 05/11/2017, Discharged on 05/26/2017   No results displayed because visit has over 200 results.        No results found for: HGBA1C  Lab Results   Component Value Date    MICROALBUR 86.4 05/21/2017    CREATININE 1.65 (H) 05/22/2017     Imaging Results (most recent)     None                Assessment and Plan:    Rocky was seen today for diabetes.    Diagnoses and all orders for this visit:    Chronic kidney disease, stage III (moderate)    Type 2 diabetes mellitus with complication, with long-term current use of insulin    Vitamin D deficiency    Hyperlipidemia, unspecified hyperlipidemia type    Type 2 diabetes mellitus with variable blood sugars  Given patient's low blood sugars will consider decreasing the basaglar.  Patient wanted basaglar to be changed to tresiba due to insurance issues  Will change tresiba to 60 units at bedtime  Continue Humalog 12 units with each meal, hold if blood sugar is less than 80, give half the dose if blood sugar is around 100 mg/dL continue Humalog 100 dose sliding scale 3 times a day before meals-2 units for 50 above 1 50 mg/dL.  Placed I Pro on the patient to monitor his blood sugar trends and further adjust his insulin regimen.    Hyperlipidemia  LDL-1 36 mg/dL, Lipitor has been increased to 40 mg daily by the primary care.  Due to altered kidney function will also change the dose of fenofibrate 48 mg oral daily.    CK D stage II  Limiting factor for her oral hypoglycemic agents.      14 minutes out of 25 minutes face to face spent in counseling the  patient extensively on placement of CGM insulin regimen changes

## 2017-10-27 ENCOUNTER — TREATMENT (OUTPATIENT)
Dept: ENDOCRINOLOGY | Age: 57
End: 2017-10-27

## 2017-10-27 ENCOUNTER — TELEPHONE (OUTPATIENT)
Dept: ENDOCRINOLOGY | Age: 57
End: 2017-10-27

## 2017-10-27 DIAGNOSIS — Z79.4 TYPE 2 DIABETES MELLITUS WITH COMPLICATION, WITH LONG-TERM CURRENT USE OF INSULIN (HCC): ICD-10-CM

## 2017-10-27 DIAGNOSIS — E11.8 TYPE 2 DIABETES MELLITUS WITH COMPLICATION, WITH LONG-TERM CURRENT USE OF INSULIN (HCC): ICD-10-CM

## 2017-10-27 PROCEDURE — 95251 CONT GLUC MNTR ANALYSIS I&R: CPT | Performed by: INTERNAL MEDICINE

## 2017-10-27 NOTE — TELEPHONE ENCOUNTER
Spoke with Patient about insulin change     Changes to the treatment regimen  Continue tresiba 60 units at bedtime   Increase Humalog to 14 units with BF  Decrease Humalog to 10 units with lunch  Continue humalog 12 units with dinner  Hold humalog if blood sugar is less than 80, give half the dose if blood sugar is around 100 mg/dL   Continue the sliding scale insulin 2 units for 50 above 150 mg/dl.   Will call the pt and make insulin changes.

## 2017-10-27 NOTE — PROGRESS NOTES
Continues glucose monitoring data    Patient wore continuous glucose monitoring-I  Pro from Oct 24 th - 27th  Average blood glucose reading was 131  Estimated HbA1c 6.2%  Likelihood of low blood glucose levels less than 1 % below 70 mg/dl  Likelihood of high blood glucose levels 30% above 150 mg/dl.   Blood glucose trends showed elevated BG between 12 pm - 2 pm and low Bg at around 6 pm    Current treatment regimen  Tresiba 60 units at bedtime  Continue Humalog 12 units with each meal, hold if blood sugar is less than 80, give half the dose if blood sugar is around 100 mg/dL   continue Humalog moderate dose sliding scale 3 times a day before meals-2 units for 50 above 1 50 mg/dL.    Changes to the treatment regimen  Continue tresiba 60 units at bedtime  Increase Humalog to 14 units with BF  Decrease Humalog to 10 units with lunch  Continue humalog 12 units with dinner  Hold humalog if blood sugar is less than 80, give half the dose if blood sugar is around 100 mg/dL   Continue the sliding scale insulin 2 units for 50 above 150 mg/dl.   Will call the pt and make insulin changes.

## 2018-01-04 DIAGNOSIS — E03.8 SUBCLINICAL HYPOTHYROIDISM: ICD-10-CM

## 2018-01-04 DIAGNOSIS — Z79.4 TYPE 2 DIABETES MELLITUS WITH COMPLICATION, WITH LONG-TERM CURRENT USE OF INSULIN (HCC): Primary | ICD-10-CM

## 2018-01-04 DIAGNOSIS — N18.30 CHRONIC KIDNEY DISEASE, STAGE III (MODERATE) (HCC): ICD-10-CM

## 2018-01-04 DIAGNOSIS — E78.5 HYPERLIPIDEMIA, UNSPECIFIED HYPERLIPIDEMIA TYPE: ICD-10-CM

## 2018-01-04 DIAGNOSIS — E11.8 TYPE 2 DIABETES MELLITUS WITH COMPLICATION, WITH LONG-TERM CURRENT USE OF INSULIN (HCC): Primary | ICD-10-CM

## 2018-01-09 ENCOUNTER — RESULTS ENCOUNTER (OUTPATIENT)
Dept: ENDOCRINOLOGY | Age: 58
End: 2018-01-09

## 2018-01-09 DIAGNOSIS — E11.8 TYPE 2 DIABETES MELLITUS WITH COMPLICATION, WITH LONG-TERM CURRENT USE OF INSULIN (HCC): ICD-10-CM

## 2018-01-09 DIAGNOSIS — E03.8 SUBCLINICAL HYPOTHYROIDISM: ICD-10-CM

## 2018-01-09 DIAGNOSIS — E78.5 HYPERLIPIDEMIA, UNSPECIFIED HYPERLIPIDEMIA TYPE: ICD-10-CM

## 2018-01-09 DIAGNOSIS — N18.30 CHRONIC KIDNEY DISEASE, STAGE III (MODERATE) (HCC): ICD-10-CM

## 2018-01-09 DIAGNOSIS — Z79.4 TYPE 2 DIABETES MELLITUS WITH COMPLICATION, WITH LONG-TERM CURRENT USE OF INSULIN (HCC): ICD-10-CM

## 2018-01-16 LAB
ALBUMIN SERPL-MCNC: 4.3 G/DL (ref 3.5–5.2)
ALBUMIN/GLOB SERPL: 1.8 G/DL
ALP SERPL-CCNC: 83 U/L (ref 39–117)
ALT SERPL-CCNC: 20 U/L (ref 1–41)
AST SERPL-CCNC: 16 U/L (ref 1–40)
BILIRUB SERPL-MCNC: 0.3 MG/DL (ref 0.1–1.2)
BUN SERPL-MCNC: 32 MG/DL (ref 6–20)
BUN/CREAT SERPL: 16.4 (ref 7–25)
CALCIUM SERPL-MCNC: 9.3 MG/DL (ref 8.6–10.5)
CHLORIDE SERPL-SCNC: 102 MMOL/L (ref 98–107)
CHOLEST SERPL-MCNC: 201 MG/DL (ref 0–200)
CO2 SERPL-SCNC: 24 MMOL/L (ref 22–29)
CREAT SERPL-MCNC: 1.95 MG/DL (ref 0.76–1.27)
GLOBULIN SER CALC-MCNC: 2.4 GM/DL
GLUCOSE SERPL-MCNC: 254 MG/DL (ref 65–99)
HBA1C MFR BLD: 7.39 % (ref 4.8–5.6)
HDLC SERPL-MCNC: 39 MG/DL (ref 40–60)
INTERPRETATION: NORMAL
INTERPRETATION: NORMAL
LDLC SERPL CALC-MCNC: 110 MG/DL (ref 0–100)
Lab: NORMAL
Lab: NORMAL
POTASSIUM SERPL-SCNC: 5.1 MMOL/L (ref 3.5–5.2)
PROT SERPL-MCNC: 6.7 G/DL (ref 6–8.5)
SODIUM SERPL-SCNC: 141 MMOL/L (ref 136–145)
T4 FREE SERPL-MCNC: 1.08 NG/DL (ref 0.93–1.7)
TRIGL SERPL-MCNC: 262 MG/DL (ref 0–150)
TSH SERPL DL<=0.005 MIU/L-ACNC: 5.83 MIU/ML (ref 0.27–4.2)
VLDLC SERPL CALC-MCNC: 52.4 MG/DL (ref 5–40)

## 2018-01-29 ENCOUNTER — OFFICE VISIT (OUTPATIENT)
Dept: ENDOCRINOLOGY | Age: 58
End: 2018-01-29

## 2018-01-29 VITALS
SYSTOLIC BLOOD PRESSURE: 122 MMHG | DIASTOLIC BLOOD PRESSURE: 62 MMHG | OXYGEN SATURATION: 97 % | HEART RATE: 70 BPM | WEIGHT: 214 LBS | BODY MASS INDEX: 33.59 KG/M2 | HEIGHT: 67 IN

## 2018-01-29 DIAGNOSIS — E78.5 HYPERLIPIDEMIA, UNSPECIFIED HYPERLIPIDEMIA TYPE: ICD-10-CM

## 2018-01-29 DIAGNOSIS — Z79.4 TYPE 2 DIABETES MELLITUS WITH COMPLICATION, WITH LONG-TERM CURRENT USE OF INSULIN (HCC): Primary | ICD-10-CM

## 2018-01-29 DIAGNOSIS — E11.8 TYPE 2 DIABETES MELLITUS WITH COMPLICATION, WITH LONG-TERM CURRENT USE OF INSULIN (HCC): Primary | ICD-10-CM

## 2018-01-29 DIAGNOSIS — E03.8 SUBCLINICAL HYPOTHYROIDISM: ICD-10-CM

## 2018-01-29 PROCEDURE — 99214 OFFICE O/P EST MOD 30 MIN: CPT | Performed by: INTERNAL MEDICINE

## 2018-01-29 RX ORDER — LEVOTHYROXINE SODIUM 0.03 MG/1
25 TABLET ORAL DAILY
Qty: 30 TABLET | Refills: 11 | Status: SHIPPED | OUTPATIENT
Start: 2018-01-29 | End: 2018-08-07

## 2018-01-29 RX ORDER — ATORVASTATIN CALCIUM 80 MG/1
80 TABLET, FILM COATED ORAL DAILY
Qty: 30 TABLET | Refills: 11 | Status: SHIPPED | OUTPATIENT
Start: 2018-01-29 | End: 2019-04-04 | Stop reason: SDUPTHER

## 2018-01-29 RX ORDER — OMEGA-3-ACID ETHYL ESTERS 1 G/1
2 CAPSULE, LIQUID FILLED ORAL DAILY
Qty: 60 CAPSULE | Refills: 11 | Status: SHIPPED | OUTPATIENT
Start: 2018-01-29 | End: 2018-03-01 | Stop reason: SDUPTHER

## 2018-01-29 NOTE — PROGRESS NOTES
57 y.o.    Patient Care Team:  SHOBHA Foley as PCP - General (Family Medicine)    Chief Complaint:    3 MONTH FOLLOW / TYPE 2 DIABETES MELLITUS  Subjective     HPI    Rocky Logan,57 y.o. WM is here as a follow up for Type 2 dm.       Type 2 dm - Diagnosed in 2000. Was started on oral agents initially. Insulin was started in 2008.   Currently on tresiba 60 units at bedtime, Humalog 12 units with each meal and a moderate dose sliding scale 2 units for 50 about 1 50 mg/dL.  Also on Tradjenta 5 mg oral daily.  Checks his blood sugars 3-4 times a day.  Fasting shdssc-685-959 mg/dL, average blood sugars after the meals around to 50 mg/dL.  No increased urination or thirst.  He had only one blood sugar less than 50 in the last 1 month and that was only in the morning when he took the insulin and did not eat his breakfast at least for one hour.  Highest blood sugar in the last 1 month was 285 mg/dL.  He got his glucometer for me to review.  No nausea or vomiting.  Last eye exam was about few weeks ago, history of diabetic retinopathy in the right eye, recent surgery to his right eye given the fungal infection.  Being managed by ophthalmologist.  Complains of tingling, burning sensation and numbness in his bilateral feet which is currently stable.  No ulcers or amputations of his feet.  Hx of CAD s/p CABG in 2008, hx of CKD states to being followed by nephrologist,hx of CVA with left sided weakness.   Pt is trying to be physically active. Weight has been stable.   Pt tries to follow DM diet for most part, did have diabetic education during hospital admission.  He does report that with the holidays he has not been so compliant with his diet restrictions.  On Ace inb.        HLP - Lipitor 40 mg po daily.        Interval History      The following portions of the patient's history were reviewed and updated as appropriate: allergies, current medications, past family history, past medical history, past social  history, past surgical history and problem list.    Past Medical History:   Diagnosis Date   • Diabetes mellitus    • Hypertension    • Stroke      Family History   Problem Relation Age of Onset   • Heart disease Mother    • Diabetes Father      Social History     Social History   • Marital status: Single     Spouse name: N/A   • Number of children: N/A   • Years of education: N/A     Occupational History   • Not on file.     Social History Main Topics   • Smoking status: Never Smoker   • Smokeless tobacco: Not on file   • Alcohol use 1.8 oz/week     3 Glasses of wine per week   • Drug use: No   • Sexual activity: Defer     Other Topics Concern   • Not on file     Social History Narrative     Allergies   Allergen Reactions   • Sulfa Antibiotics Rash       Current Outpatient Prescriptions:   •  amitriptyline (ELAVIL) 25 MG tablet, Take 25 mg by mouth Every Night., Disp: , Rfl:   •  amLODIPine (NORVASC) 10 MG tablet, Take 1 tablet by mouth Daily., Disp: 30 tablet, Rfl: 1  •  aspirin 81 MG chewable tablet, Chew 1 tablet Daily., Disp: 30 tablet, Rfl: 5  •  atorvastatin (LIPITOR) 80 MG tablet, Take 1 tablet by mouth Daily., Disp: 30 tablet, Rfl: 11  •  carvedilol (COREG) 25 MG tablet, Take 20 mg by mouth Daily., Disp: , Rfl:   •  clopidogrel (PLAVIX) 75 MG tablet, Take 2 tablets by mouth Daily., Disp: 60 tablet, Rfl: 5  •  fenofibrate (TRICOR) 48 MG tablet, Take 1 tablet by mouth Daily., Disp: 30 tablet, Rfl: 11  •  Insulin Lispro (HUMALOG KWIKPEN) 100 UNIT/ML solution pen-injector, Inject 12 Units under the skin 3 (Three) Times a Day., Disp: , Rfl:   •  levETIRAcetam XR (KEPPRA XR) 500 MG 24 hr tablet, Take 2 tablets by mouth Daily., Disp: , Rfl:   •  linagliptin (TRADJENTA) 5 MG tablet tablet, Take 5 mg by mouth Daily., Disp: , Rfl:   •  lisinopril (PRINIVIL,ZESTRIL) 40 MG tablet, Take 1 tablet by mouth Daily., Disp: 30 tablet, Rfl: 1  •  melatonin 3 MG tablet, Take 1 tablet by mouth At Night As Needed for Sleep.,  "Disp: , Rfl:   •  glucose blood (ACCU-CHEK MILENA) test strip, To check to check 3 - 4 times, Disp: 100 each, Rfl: 3  •  glucose blood (ACCU-CHEK COMPACT PLUS) test strip, To check 3 - 4 times a day, Disp: 100 each, Rfl: 2  •  Insulin Degludec 200 UNIT/ML solution pen-injector, Inject 65 Units under the skin every night at bedtime., Disp: 5 pen, Rfl: 3  •  Lancets (ACCU-CHEK SOFT TOUCH) lancets, To check 3 - 4 times a day, Disp: 100 each, Rfl: 2  •  levothyroxine (SYNTHROID, LEVOTHROID) 25 MCG tablet, Take 1 tablet by mouth Daily., Disp: 30 tablet, Rfl: 11  •  omega-3 acid ethyl esters (LOVAZA) 1 g capsule, Take 2 capsules by mouth Daily., Disp: 60 capsule, Rfl: 11        Review of Systems   Constitutional: Negative for fever.   HENT: Negative for facial swelling, nosebleeds, trouble swallowing and voice change.    Eyes: Positive for pain and redness.   Respiratory: Negative for shortness of breath and wheezing.    Cardiovascular: Negative for palpitations and leg swelling.   Gastrointestinal: Negative for abdominal pain, diarrhea and vomiting.   Endocrine: Negative for polydipsia and polyuria.   Genitourinary: Negative for decreased urine volume and frequency.   Musculoskeletal: Negative for joint swelling and neck pain.   Skin: Negative for rash.   Allergic/Immunologic: Negative for immunocompromised state.   Neurological: Positive for numbness. Negative for seizures, facial asymmetry and light-headedness.   Hematological: Does not bruise/bleed easily.   Psychiatric/Behavioral: Positive for sleep disturbance. Negative for agitation and confusion.       Objective       Vitals:    01/29/18 1052   BP: 122/62   Pulse: 70   SpO2: 97%   Weight: 97.1 kg (214 lb)   Height: 168.9 cm (66.5\")     Body mass index is 34.02 kg/(m^2).      Physical Exam   Gen exam - alert and oriented x 3, obese male, not in distress.   HEENT - Acanthosis nigricans. Thyroid palpable, right eye red.   Resp - Clear to auscultation.   CVS - S1,S2 " heard and no murmurs.   Abd - Non tender, BS heard.   Ext - No edema, left sided weakness.     Results Review:     I reviewed the patient's new clinical results.    Medical records reviewed  Summary: done      Results Encounter on 01/09/2018   Component Date Value Ref Range Status   • TSH 01/15/2018 5.830* 0.270 - 4.200 mIU/mL Final   • Free T4 01/15/2018 1.08  0.93 - 1.70 ng/dL Final   • Hemoglobin A1C 01/15/2018 7.39* 4.80 - 5.60 % Final    Comment: Hemoglobin A1C Ranges:  Increased Risk for Diabetes  5.7% to 6.4%  Diabetes                     >= 6.5%  Diabetic Goal                < 7.0%     • Glucose 01/15/2018 254* 65 - 99 mg/dL Final   • BUN 01/15/2018 32* 6 - 20 mg/dL Final   • Creatinine 01/15/2018 1.95* 0.76 - 1.27 mg/dL Final   • eGFR Non  Am 01/15/2018 36* >60 mL/min/1.73 Final   • eGFR African Am 01/15/2018 43* >60 mL/min/1.73 Final   • BUN/Creatinine Ratio 01/15/2018 16.4  7.0 - 25.0 Final   • Sodium 01/15/2018 141  136 - 145 mmol/L Final   • Potassium 01/15/2018 5.1  3.5 - 5.2 mmol/L Final   • Chloride 01/15/2018 102  98 - 107 mmol/L Final   • Total CO2 01/15/2018 24.0  22.0 - 29.0 mmol/L Final   • Calcium 01/15/2018 9.3  8.6 - 10.5 mg/dL Final   • Total Protein 01/15/2018 6.7  6.0 - 8.5 g/dL Final   • Albumin 01/15/2018 4.30  3.50 - 5.20 g/dL Final   • Globulin 01/15/2018 2.4  gm/dL Final   • A/G Ratio 01/15/2018 1.8  g/dL Final   • Total Bilirubin 01/15/2018 0.3  0.1 - 1.2 mg/dL Final   • Alkaline Phosphatase 01/15/2018 83  39 - 117 U/L Final   • AST (SGOT) 01/15/2018 16  1 - 40 U/L Final   • ALT (SGPT) 01/15/2018 20  1 - 41 U/L Final   • Total Cholesterol 01/15/2018 201* 0 - 200 mg/dL Final   • Triglycerides 01/15/2018 262* 0 - 150 mg/dL Final   • HDL Cholesterol 01/15/2018 39* 40 - 60 mg/dL Final   • VLDL Cholesterol 01/15/2018 52.4* 5 - 40 mg/dL Final   • LDL Cholesterol  01/15/2018 110* 0 - 100 mg/dL Final   • Interpretation 01/15/2018 Note   Final    Comment:  -------------------------------  CHRONIC KIDNEY DISEASE:  EGFR, BLOOD PRESSURE, AND PROTEINURIA ASSESSMENT  We presume eGFR has been less than 60 mL/min/1.73mE2 on at  least two occasions spaced at least 3 months apart. Current  eGFR is 36 mL/min/1.73mE2 corresponding to CKD stage 3b.  Multiply eGFR by 1.159 if patient is African American.  Potassium is within goal, 5.1 mmol/L. Glycemic control (HB  A1c: 7.4 %) is above goal but may be appropriate if patient  is at risk for hypoglycemia.  EGFR, BLOOD PRESSURE, AND PROTEINURIA TREATMENT SUGGESTIONS  -  Guidelines recommend a target blood pressure of 140/90 mmHg  or less in CKD patients to reduce cardiovascular risk and  CKD progression. Assessment of albuminuria (urine  albumin:creatinine ratio or urine protein:creatinine ratio  preferred) is recommended at least annually in CKD patients  for staging and disease prognosis.  EGFR, BLOOD PRESSURE, AND PROTEINURIA FOLLOW-UP  -  fasting Renal Panel within 6 months; Spot Urine Panel is  miguel                           mmended by KDOQI guidelines, at least yearly; Hemoglobin  A1C within 3 months;  -  BONE and MINERAL ASSESSMENT  Calcium is within goal, 9.3 mg/dL. Carbon Dioxide is within  goal, 24 mmol/L. Guidelines recommend the measurement of  25-hydroxy vitamin D in patients with CKD.  BONE and MINERAL TREATMENT SUGGESTIONS  -  Interpretations require simultaneous measurements of serum  calcium and phosphorus.  BONE and MINERAL FOLLOW-UP  -  fasting PTH with Renal Panel and 25-Hydroxy Vitamin D are  recommended by KDOQI guidelines, at least yearly;  -  LIPIDS ASSESSMENT  LDL-C is borderline high, 110 mg/dL. Triglyceride is high,  262 mg/dL. Non-HDL Cholesterol is high, 162 mg/dL. HDL-C is  low, 39 mg/dL.  LIPIDS TREATMENT SUGGESTIONS  -  Therapeutic lifestyle changes are always valuable to  maintain optimal blood lipid status (diet, exercise, weight  management). Begin statin. If statin already in use,  consider  increasing dose to achieve at least a 50% LDL  reduction from baseline. Moderate or hi                           gh intensity statin  is preferred. If statin cannot be tolerated or increased,  alternatives include use of an intestinal agent (ezetimibe  or bile acid sequestrant), niacin, and/or fish oil.  LIPIDS FOLLOW-UP  -  fasting Lipid Panel within 3 months;  -  ANEMIA ASSESSMENT  Most recent order does not include a CBC Panel or iron  studies.  ANEMIA FOLLOW-UP  -  CBC is recommended by KDOQI guidelines, at least yearly;  -------------------------------  DISCLAIMER  These assessments and treatment suggestions are provided as  a convenience in support of the physician-patient  relationship and are not intended to replace the physician's  clinical judgment. They are derived from national guidelines  in addition to other evidence and expert opinion. The  clinician should consider this information within the  context of clinical opinion and the individual patient.  SEE GUIDANCE FOR CHRONIC KIDNEY DISEASE PROGRAM: Kidney  Disease Improving Global Outcomes (KDIGO) clinical practice  guidelines are                            at http://kdigo.org/home/guidelines/.  [employer] Kidney Disease Outcomes Quality  Initiative (KDOQI (TM)), with its limitations and  disclaimers, are at www.kidney.org/professionals/KDOQI. This  program is intended for patients who have been diagnosed  with stages 3, 4, or pre-dialysis 5 CKD. It is not intended  for children, pregnant patients, or transplant patients.     • Interpretation 01/15/2018 Note   Final    Supplemental report is available.   • PDF Image 01/15/2018 Not applicable   Final   • PDF Image 01/15/2018 Not applicable   Final     Lab Results   Component Value Date    HGBA1C 7.39 (H) 01/15/2018     Lab Results   Component Value Date    MICROALBUR 86.4 05/21/2017    CREATININE 1.95 (H) 01/15/2018     Imaging Results (most recent)     None                Assessment  and Plan:    Rocky was seen today for diabetes.    Diagnoses and all orders for this visit:    Type 2 diabetes mellitus with complication, with long-term current use of insulin    Hyperlipidemia, unspecified hyperlipidemia type    Subclinical hypothyroidism    Other orders  -     atorvastatin (LIPITOR) 80 MG tablet; Take 1 tablet by mouth Daily.  -     omega-3 acid ethyl esters (LOVAZA) 1 g capsule; Take 2 capsules by mouth Daily.  -     levothyroxine (SYNTHROID, LEVOTHROID) 25 MCG tablet; Take 1 tablet by mouth Daily.  -     Insulin Degludec 200 UNIT/ML solution pen-injector; Inject 65 Units under the skin every night at bedtime.  -     glucose blood (ACCU-CHEK COMPACT PLUS) test strip; To check 3 - 4 times a day  -     glucose blood (ACCU-CHEK MILENA) test strip; To check to check 3 - 4 times  -     Lancets (ACCU-CHEK SOFT TOUCH) lancets; To check 3 - 4 times a day      Type 2 diabetes mellitus-uncontrolled  Worsened HbA1c since last visit  Increased tresiba to 65 units at bedtime  Increase NovoLog to 16 units with breakfast  Increase NovoLog to 12 units with lunch, 14 units with dinner  Continue the moderate dose sliding scale 3 times a day before meals and at bedtime-2 units for 50 about 1 50 mg/dL  Continue Tradjenta  Called in a new prescription for the patient which is covered by his insurance.  Explained to the patient that NovoLog is similar to Humalog and it okay to interchange it due to his insurance.    Diabetic retinopathy  Managed by ophthalmology.    Hyperlipidemia  Will start patient on lovaza.   Will increase Lipitor to 80 mg oral daily.    Hypothyroidism  Will start patient on levothyroxine 25 µg oral daily.    Reviewed Lab results with the patient.     14 minutes out of 25 minutes face to face spent in counseling the patient extensively on insulin regimen changes, new medications.

## 2018-02-06 ENCOUNTER — TRANSCRIBE ORDERS (OUTPATIENT)
Dept: ADMINISTRATIVE | Facility: HOSPITAL | Age: 58
End: 2018-02-06

## 2018-02-06 DIAGNOSIS — N18.30 CHRONIC KIDNEY DISEASE, STAGE III (MODERATE) (HCC): Primary | ICD-10-CM

## 2018-02-07 ENCOUNTER — HOSPITAL ENCOUNTER (OUTPATIENT)
Dept: ULTRASOUND IMAGING | Facility: HOSPITAL | Age: 58
Discharge: HOME | End: 2018-02-07
Attending: INTERNAL MEDICINE | Admitting: INTERNAL MEDICINE

## 2018-02-07 ENCOUNTER — LAB (OUTPATIENT)
Dept: LAB | Facility: HOSPITAL | Age: 58
End: 2018-02-07
Attending: INTERNAL MEDICINE

## 2018-02-07 ENCOUNTER — TRANSCRIBE ORDERS (OUTPATIENT)
Dept: ADMINISTRATIVE | Facility: HOSPITAL | Age: 58
End: 2018-02-07

## 2018-02-07 DIAGNOSIS — N18.30 CHRONIC KIDNEY DISEASE, STAGE III (MODERATE) (HCC): Primary | ICD-10-CM

## 2018-02-07 DIAGNOSIS — N18.30 CHRONIC KIDNEY DISEASE, STAGE III (MODERATE) (HCC): ICD-10-CM

## 2018-02-07 DIAGNOSIS — I12.9 HYPERTENSIVE NEPHROPATHY: ICD-10-CM

## 2018-02-07 DIAGNOSIS — E11.29 TYPE II DIABETES MELLITUS WITH RENAL MANIFESTATIONS NOT AT GOAL (HCC): ICD-10-CM

## 2018-02-07 LAB
ALBUMIN SERPL-MCNC: 4.4 G/DL (ref 3.5–5.2)
ALBUMIN/GLOB SERPL: 1.9 G/DL
ALP SERPL-CCNC: 60 U/L (ref 39–117)
ALT SERPL W P-5'-P-CCNC: 18 U/L (ref 1–41)
ANION GAP SERPL CALCULATED.3IONS-SCNC: 9.8 MMOL/L
AST SERPL-CCNC: 19 U/L (ref 1–40)
BASOPHILS # BLD AUTO: 0.04 10*3/MM3 (ref 0–0.2)
BASOPHILS NFR BLD AUTO: 0.5 % (ref 0–1.5)
BILIRUB SERPL-MCNC: 0.4 MG/DL (ref 0.1–1.2)
BUN BLD-MCNC: 24 MG/DL (ref 6–20)
BUN/CREAT SERPL: 13.3 (ref 7–25)
CALCIUM SPEC-SCNC: 9.3 MG/DL (ref 8.6–10.5)
CHLORIDE SERPL-SCNC: 103 MMOL/L (ref 98–107)
CO2 SERPL-SCNC: 28.2 MMOL/L (ref 22–29)
COLLECT DURATION TIME UR: 24 HRS
COLLECT DURATION TIME UR: 24 HRS
CREAT BLD-MCNC: 1.74 MG/DL (ref 0.76–1.27)
CREAT BLD-MCNC: 1.81 MG/DL (ref 0.76–1.27)
CREAT CL 24H UR+SERPL-VRATE: 47.5 ML/MIN (ref 97–137)
CREAT CL 24H UR+SERPL-VRATE: 68.4 L/24 HR (ref 139.7–197.3)
CREAT UR-MCNC: 82.7 MG/DL
CREATINE 24H UR-MRATE: 1.49 G/24 HR (ref 1–2.4)
DEPRECATED RDW RBC AUTO: 42.1 FL (ref 37–54)
EOSINOPHIL # BLD AUTO: 0.27 10*3/MM3 (ref 0–0.7)
EOSINOPHIL NFR BLD AUTO: 3.7 % (ref 0.3–6.2)
ERYTHROCYTE [DISTWIDTH] IN BLOOD BY AUTOMATED COUNT: 13.4 % (ref 11.5–14.5)
GFR SERPL CREATININE-BSD FRML MDRD: 39 ML/MIN/1.73
GFR SERPL CREATININE-BSD FRML MDRD: 41 ML/MIN/1.73
GLOBULIN UR ELPH-MCNC: 2.3 GM/DL
GLUCOSE BLD-MCNC: 117 MG/DL (ref 65–99)
HCT VFR BLD AUTO: 33.9 % (ref 40.4–52.2)
HGB BLD-MCNC: 11.4 G/DL (ref 13.7–17.6)
IMM GRANULOCYTES # BLD: 0.03 10*3/MM3 (ref 0–0.03)
IMM GRANULOCYTES NFR BLD: 0.4 % (ref 0–0.5)
LYMPHOCYTES # BLD AUTO: 1.29 10*3/MM3 (ref 0.9–4.8)
LYMPHOCYTES NFR BLD AUTO: 17.6 % (ref 19.6–45.3)
MCH RBC QN AUTO: 29 PG (ref 27–32.7)
MCHC RBC AUTO-ENTMCNC: 33.6 G/DL (ref 32.6–36.4)
MCV RBC AUTO: 86.3 FL (ref 79.8–96.2)
MONOCYTES # BLD AUTO: 0.61 10*3/MM3 (ref 0.2–1.2)
MONOCYTES NFR BLD AUTO: 8.3 % (ref 5–12)
NEUTROPHILS # BLD AUTO: 5.11 10*3/MM3 (ref 1.9–8.1)
NEUTROPHILS NFR BLD AUTO: 69.5 % (ref 42.7–76)
PLATELET # BLD AUTO: 166 10*3/MM3 (ref 140–500)
PMV BLD AUTO: 10.3 FL (ref 6–12)
POTASSIUM BLD-SCNC: 4.3 MMOL/L (ref 3.5–5.2)
PROT 24H UR-MRATE: 1836 MG/24HOURS (ref 0–150)
PROT SERPL-MCNC: 6.7 G/DL (ref 6–8.5)
PROT UR-MCNC: 102 MG/DL
RBC # BLD AUTO: 3.93 10*6/MM3 (ref 4.6–6)
SODIUM BLD-SCNC: 141 MMOL/L (ref 136–145)
SPECIMEN VOL 24H UR: 1800 ML
WBC NRBC COR # BLD: 7.35 10*3/MM3 (ref 4.5–10.7)

## 2018-02-07 PROCEDURE — 81050 URINALYSIS VOLUME MEASURE: CPT

## 2018-02-07 PROCEDURE — 84165 PROTEIN E-PHORESIS SERUM: CPT

## 2018-02-07 PROCEDURE — 82575 CREATININE CLEARANCE TEST: CPT

## 2018-02-07 PROCEDURE — 82565 ASSAY OF CREATININE: CPT

## 2018-02-07 PROCEDURE — 76775 US EXAM ABDO BACK WALL LIM: CPT

## 2018-02-07 PROCEDURE — 85025 COMPLETE CBC W/AUTO DIFF WBC: CPT

## 2018-02-07 PROCEDURE — 84156 ASSAY OF PROTEIN URINE: CPT

## 2018-02-07 PROCEDURE — 36415 COLL VENOUS BLD VENIPUNCTURE: CPT

## 2018-02-07 PROCEDURE — 80053 COMPREHEN METABOLIC PANEL: CPT

## 2018-02-08 ENCOUNTER — APPOINTMENT (OUTPATIENT)
Dept: ULTRASOUND IMAGING | Facility: HOSPITAL | Age: 58
End: 2018-02-08
Attending: INTERNAL MEDICINE

## 2018-02-08 LAB
ALBUMIN SERPL-MCNC: 3.8 G/DL (ref 2.9–4.4)
ALBUMIN/GLOB SERPL: 1.6 {RATIO} (ref 0.7–1.7)
ALPHA1 GLOB FLD ELPH-MCNC: 0.2 G/DL (ref 0–0.4)
ALPHA2 GLOB SERPL ELPH-MCNC: 0.8 G/DL (ref 0.4–1)
B-GLOBULIN SERPL ELPH-MCNC: 0.9 G/DL (ref 0.7–1.3)
GAMMA GLOB SERPL ELPH-MCNC: 0.5 G/DL (ref 0.4–1.8)
GLOBULIN SER CALC-MCNC: 2.4 G/DL (ref 2.2–3.9)
Lab: NORMAL
M-SPIKE: NORMAL G/DL
PROT PATTERN SERPL ELPH-IMP: NORMAL
PROT SERPL-MCNC: 6.2 G/DL (ref 6–8.5)

## 2018-02-19 ENCOUNTER — PRIOR AUTHORIZATION (OUTPATIENT)
Dept: ENDOCRINOLOGY | Age: 58
End: 2018-02-19

## 2018-02-26 ENCOUNTER — PRIOR AUTHORIZATION (OUTPATIENT)
Dept: ENDOCRINOLOGY | Age: 58
End: 2018-02-26

## 2018-02-26 RX ORDER — FENOFIBRATE 48 MG/1
48 TABLET, COATED ORAL DAILY
Qty: 30 TABLET | Refills: 11 | Status: SHIPPED | OUTPATIENT
Start: 2018-02-26 | End: 2018-03-01 | Stop reason: SDUPTHER

## 2018-02-26 NOTE — TELEPHONE ENCOUNTER
PT'S PA FOR FENOFIBRATE WAS SUBMITTED TO [facility] ON 2/26/18 AND WAS APPROVED. PHARMACY WAS NOTIFIED.

## 2018-03-01 RX ORDER — FENOFIBRATE 48 MG/1
48 TABLET, COATED ORAL DAILY
Qty: 30 TABLET | Refills: 11 | Status: SHIPPED | OUTPATIENT
Start: 2018-03-01 | End: 2018-08-07

## 2018-03-01 RX ORDER — OMEGA-3-ACID ETHYL ESTERS 1 G/1
2 CAPSULE, LIQUID FILLED ORAL DAILY
Qty: 60 CAPSULE | Refills: 11 | Status: SHIPPED | OUTPATIENT
Start: 2018-03-01 | End: 2018-04-03 | Stop reason: SDUPTHER

## 2018-03-13 ENCOUNTER — TELEPHONE (OUTPATIENT)
Dept: ENDOCRINOLOGY | Age: 58
End: 2018-03-13

## 2018-03-13 ENCOUNTER — PRIOR AUTHORIZATION (OUTPATIENT)
Dept: ENDOCRINOLOGY | Age: 58
End: 2018-03-13

## 2018-03-13 NOTE — TELEPHONE ENCOUNTER
PT'S PA FOR LOVAZA WAS SUBMITTED TO [facility] ON 3/13/18. INSURANCE PREFERS [employer]. PROVIDER WAS INFORMED.

## 2018-03-13 NOTE — TELEPHONE ENCOUNTER
----- Message from Beverly Walters MA sent at 3/13/2018  2:08 PM EDT -----  PT'S INSURANCE PREFERS VASCE[employer] OVER LOVAZA. PLEASE ADVISE.

## 2018-04-03 RX ORDER — OMEGA-3-ACID ETHYL ESTERS 1 G/1
2 CAPSULE, LIQUID FILLED ORAL DAILY
Qty: 60 CAPSULE | Refills: 11 | Status: SHIPPED | OUTPATIENT
Start: 2018-04-03 | End: 2019-04-02 | Stop reason: SDUPTHER

## 2018-04-23 ENCOUNTER — OFFICE VISIT (OUTPATIENT)
Dept: ENDOCRINOLOGY | Age: 58
End: 2018-04-23

## 2018-04-23 VITALS
HEART RATE: 71 BPM | DIASTOLIC BLOOD PRESSURE: 72 MMHG | OXYGEN SATURATION: 96 % | WEIGHT: 216 LBS | BODY MASS INDEX: 34.34 KG/M2 | SYSTOLIC BLOOD PRESSURE: 130 MMHG

## 2018-04-23 DIAGNOSIS — E03.9 ACQUIRED HYPOTHYROIDISM: ICD-10-CM

## 2018-04-23 DIAGNOSIS — E78.5 HYPERLIPIDEMIA, UNSPECIFIED HYPERLIPIDEMIA TYPE: ICD-10-CM

## 2018-04-23 DIAGNOSIS — E11.8 TYPE 2 DIABETES MELLITUS WITH COMPLICATION, WITH LONG-TERM CURRENT USE OF INSULIN (HCC): Primary | ICD-10-CM

## 2018-04-23 DIAGNOSIS — Z79.4 TYPE 2 DIABETES MELLITUS WITH COMPLICATION, WITH LONG-TERM CURRENT USE OF INSULIN (HCC): Primary | ICD-10-CM

## 2018-04-23 LAB
BUN SERPL-MCNC: 30 MG/DL (ref 6–20)
BUN/CREAT SERPL: 14 (ref 7–25)
CALCIUM SERPL-MCNC: 9.4 MG/DL (ref 8.6–10.5)
CHLORIDE SERPL-SCNC: 106 MMOL/L (ref 98–107)
CO2 SERPL-SCNC: 26 MMOL/L (ref 22–29)
CREAT SERPL-MCNC: 2.15 MG/DL (ref 0.76–1.27)
GFR SERPLBLD CREATININE-BSD FMLA CKD-EPI: 32 ML/MIN/1.73
GFR SERPLBLD CREATININE-BSD FMLA CKD-EPI: 38 ML/MIN/1.73
GLUCOSE SERPL-MCNC: 120 MG/DL (ref 65–99)
HBA1C MFR BLD: 7.9 % (ref 4.8–5.6)
POTASSIUM SERPL-SCNC: 4.6 MMOL/L (ref 3.5–5.2)
SODIUM SERPL-SCNC: 143 MMOL/L (ref 136–145)
TSH SERPL DL<=0.005 MIU/L-ACNC: 3.34 MIU/ML (ref 0.27–4.2)

## 2018-04-23 PROCEDURE — 99214 OFFICE O/P EST MOD 30 MIN: CPT | Performed by: INTERNAL MEDICINE

## 2018-04-23 RX ORDER — OMEPRAZOLE 40 MG/1
40 CAPSULE, DELAYED RELEASE ORAL DAILY
COMMUNITY

## 2018-04-23 NOTE — PROGRESS NOTES
57 y.o.    Patient Care Team:  SHOBHA Foley as PCP - General (Family Medicine)    Chief Complaint:    3 MONTH FOLLOW UP/ TYPE 2 DIABETES MELLITUS  Subjective     HPI     Rocky Logan,57 y.o. WM is here as a follow up for Type 2 dm.       Type 2 dm - Diagnosed in 2000. Was started on oral agents initially. Insulin was started in 2008.   Currently on tresiba 60 units at bedtime, Humalog 12 units with breakfast, 16 units with lunch and 12 units with dinner and moderate dose sliding scale 2 units for 50 above 1 50 mg/dL with each meal.  Also on Tradjenta 5 mg oral daily.  He checks his blood sugars at least 3-4 times a day.  He got his glucometer for me to review.  Average blood sugars around 110-1 40 mg/dL.  Patient did have few blood sugars which are around 250 mg/dL.  No increased urination or thirst.  He did have few blood sugars which were around 59-58 mg/dL range in the last 1 month and that was when he woke up very late in the morning after taking tresiba 60 units at bedtime.  Last eye examination was 4 months ago and does have history of diabetic retinopathy in the right eye.  Being managed by ophthalmologist.  Stable diabetic neuropathy.  No ulcers or amputations of his feet.  History of CABG in 2008, history of CK D which is stable, history of CVA with left-sided weakness.  He is physically active.  Weight has been relatively stable.  Does follow diabetic diet for most part.  On Ace inhibitor.    Hyperlipidemia  On Lipitor 80 mg oral daily, fish oil.    Interval History      The following portions of the patient's history were reviewed and updated as appropriate: allergies, current medications, past family history, past medical history, past social history, past surgical history and problem list.    Past Medical History:   Diagnosis Date   • Diabetes mellitus    • Hypertension    • Stroke      Family History   Problem Relation Age of Onset   • Heart disease Mother    • Diabetes Father      Social  History     Social History   • Marital status: Single     Spouse name: N/A   • Number of children: N/A   • Years of education: N/A     Occupational History   • Not on file.     Social History Main Topics   • Smoking status: Never Smoker   • Smokeless tobacco: Not on file   • Alcohol use 1.8 oz/week     3 Glasses of wine per week   • Drug use: No   • Sexual activity: Defer     Other Topics Concern   • Not on file     Social History Narrative   • No narrative on file     Allergies   Allergen Reactions   • Sulfa Antibiotics Rash       Current Outpatient Prescriptions:   •  amitriptyline (ELAVIL) 25 MG tablet, Take 25 mg by mouth Every Night., Disp: , Rfl:   •  amLODIPine (NORVASC) 10 MG tablet, Take 1 tablet by mouth Daily., Disp: 30 tablet, Rfl: 1  •  aspirin 81 MG chewable tablet, Chew 1 tablet Daily., Disp: 30 tablet, Rfl: 5  •  atorvastatin (LIPITOR) 80 MG tablet, Take 1 tablet by mouth Daily., Disp: 30 tablet, Rfl: 11  •  carvedilol (COREG) 25 MG tablet, Take 20 mg by mouth Daily., Disp: , Rfl:   •  clopidogrel (PLAVIX) 75 MG tablet, Take 2 tablets by mouth Daily., Disp: 60 tablet, Rfl: 5  •  fenofibrate (TRICOR) 48 MG tablet, Take 1 tablet by mouth Daily., Disp: 30 tablet, Rfl: 11  •  glucose blood (FREESTYLE LITE) test strip, USE TO TEST BLOOD SUGAR 4 TIMES DAILY ICD 10 CODE E11.9, Disp: 200 each, Rfl: 5  •  Insulin Degludec 200 UNIT/ML solution pen-injector, Inject 65 Units under the skin every night at bedtime., Disp: 5 pen, Rfl: 3  •  Insulin Lispro (HUMALOG KWIKPEN) 100 UNIT/ML solution pen-injector, Inject 12 Units under the skin 3 (Three) Times a Day., Disp: , Rfl:   •  Lancets (ACCU-CHEK SOFT TOUCH) lancets, To check 3 - 4 times a day, Disp: 100 each, Rfl: 2  •  levothyroxine (SYNTHROID, LEVOTHROID) 25 MCG tablet, Take 1 tablet by mouth Daily., Disp: 30 tablet, Rfl: 11  •  lisinopril (PRINIVIL,ZESTRIL) 40 MG tablet, Take 1 tablet by mouth Daily., Disp: 30 tablet, Rfl: 1  •  melatonin 3 MG tablet, Take 1  tablet by mouth At Night As Needed for Sleep., Disp: , Rfl:   •  omega-3 acid ethyl esters (LOVAZA) 1 g capsule, Take 2 capsules by mouth Daily., Disp: 60 capsule, Rfl: 11  •  omeprazole (priLOSEC) 40 MG capsule, Take 40 mg by mouth Daily., Disp: , Rfl:   •  glucose blood (ACCU-CHEK MILENA) test strip, To check to check 3 - 4 times, Disp: 100 each, Rfl: 3  •  glucose blood (ACCU-CHEK COMPACT PLUS) test strip, To check 3 - 4 times a day, Disp: 100 each, Rfl: 2  •  levETIRAcetam XR (KEPPRA XR) 500 MG 24 hr tablet, Take 2 tablets by mouth Daily., Disp: , Rfl:   •  linagliptin (TRADJENTA) 5 MG tablet tablet, Take 5 mg by mouth Daily., Disp: , Rfl:         Review of Systems   Constitutional: Negative for fever.   HENT: Negative for facial swelling, nosebleeds, trouble swallowing and voice change.    Eyes: Negative for pain and redness.   Respiratory: Positive for shortness of breath. Negative for wheezing.    Cardiovascular: Negative for palpitations and leg swelling.   Gastrointestinal: Negative for abdominal pain, diarrhea and vomiting.   Endocrine: Positive for polydipsia. Negative for polyuria.   Genitourinary: Negative for decreased urine volume and frequency.   Musculoskeletal: Negative for joint swelling and neck pain.   Skin: Negative for rash.   Allergic/Immunologic: Negative for immunocompromised state.   Neurological: Positive for seizures. Negative for facial asymmetry.   Hematological: Does not bruise/bleed easily.   Psychiatric/Behavioral: Negative for confusion.       Objective       Vitals:    04/23/18 0928   BP: 130/72   Pulse: 71   SpO2: 96%   Weight: 98 kg (216 lb)     Body mass index is 34.34 kg/m².      Physical Exam   Gen exam - alert and oriented x 3, obese male, not in distress.   HEENT - Acanthosis nigricans. Thyroid palpable.   Resp - Clear to auscultation.   CVS - S1,S2 heard and no murmurs.   Abd - Non tender, BS heard.   Ext - left sided weakness.    Results Review:     I reviewed the  patient's new clinical results.    Medical records reviewed  Summary: done      Lab on 02/07/2018   Component Date Value Ref Range Status   • Glucose 02/07/2018 117* 65 - 99 mg/dL Final   • BUN 02/07/2018 24* 6 - 20 mg/dL Final   • Creatinine 02/07/2018 1.81* 0.76 - 1.27 mg/dL Final   • Sodium 02/07/2018 141  136 - 145 mmol/L Final   • Potassium 02/07/2018 4.3  3.5 - 5.2 mmol/L Final   • Chloride 02/07/2018 103  98 - 107 mmol/L Final   • CO2 02/07/2018 28.2  22.0 - 29.0 mmol/L Final   • Calcium 02/07/2018 9.3  8.6 - 10.5 mg/dL Final   • Total Protein 02/07/2018 6.7  6.0 - 8.5 g/dL Final   • Albumin 02/07/2018 4.40  3.50 - 5.20 g/dL Final   • ALT (SGPT) 02/07/2018 18  1 - 41 U/L Final   • AST (SGOT) 02/07/2018 19  1 - 40 U/L Final   • Alkaline Phosphatase 02/07/2018 60  39 - 117 U/L Final   • Total Bilirubin 02/07/2018 0.4  0.1 - 1.2 mg/dL Final   • eGFR Non  Amer 02/07/2018 39* >60 mL/min/1.73 Final   • Globulin 02/07/2018 2.3  gm/dL Final   • A/G Ratio 02/07/2018 1.9  g/dL Final   • BUN/Creatinine Ratio 02/07/2018 13.3  7.0 - 25.0 Final   • Anion Gap 02/07/2018 9.8  mmol/L Final   • Total Protein 02/08/2018 6.2  6.0 - 8.5 g/dL Final   • Albumin 02/08/2018 3.8  2.9 - 4.4 g/dL Final   • Alpha-1-Globulin 02/08/2018 0.2  0.0 - 0.4 g/dL Final   • Alpha-2-Globulin 02/08/2018 0.8  0.4 - 1.0 g/dL Final   • Beta Globulin 02/08/2018 0.9  0.7 - 1.3 g/dL Final   • Gamma Globulin 02/08/2018 0.5  0.4 - 1.8 g/dL Final   • M-Mike 02/08/2018 Not Observed  Not Observed g/dL Final   • Globulin 02/08/2018 2.4  2.2 - 3.9 g/dL Final   • A/G Ratio 02/08/2018 1.6  0.7 - 1.7 Final   • Please note 02/08/2018 Comment   Final   • SPE Interpretation 02/08/2018 Comment   Final   • Protein, 24H Urine 02/07/2018 1836.0* 0.0 - 150.0 mg/24hours Final   • Total Protein, Urine 02/07/2018 102.0  mg/dL Final   • 24H Urine Volume 02/07/2018 1800  mL Final   • Time (Hours) 02/07/2018 24  hrs Final   • WBC 02/07/2018 7.35  4.50 - 10.70 10*3/mm3  Final   • RBC 02/07/2018 3.93* 4.60 - 6.00 10*6/mm3 Final   • Hemoglobin 02/07/2018 11.4* 13.7 - 17.6 g/dL Final   • Hematocrit 02/07/2018 33.9* 40.4 - 52.2 % Final   • MCV 02/07/2018 86.3  79.8 - 96.2 fL Final   • MCH 02/07/2018 29.0  27.0 - 32.7 pg Final   • MCHC 02/07/2018 33.6  32.6 - 36.4 g/dL Final   • RDW 02/07/2018 13.4  11.5 - 14.5 % Final   • RDW-SD 02/07/2018 42.1  37.0 - 54.0 fl Final   • MPV 02/07/2018 10.3  6.0 - 12.0 fL Final   • Platelets 02/07/2018 166  140 - 500 10*3/mm3 Final   • Neutrophil % 02/07/2018 69.5  42.7 - 76.0 % Final   • Lymphocyte % 02/07/2018 17.6* 19.6 - 45.3 % Final   • Monocyte % 02/07/2018 8.3  5.0 - 12.0 % Final   • Eosinophil % 02/07/2018 3.7  0.3 - 6.2 % Final   • Basophil % 02/07/2018 0.5  0.0 - 1.5 % Final   • Immature Grans % 02/07/2018 0.4  0.0 - 0.5 % Final   • Neutrophils, Absolute 02/07/2018 5.11  1.90 - 8.10 10*3/mm3 Final   • Lymphocytes, Absolute 02/07/2018 1.29  0.90 - 4.80 10*3/mm3 Final   • Monocytes, Absolute 02/07/2018 0.61  0.20 - 1.20 10*3/mm3 Final   • Eosinophils, Absolute 02/07/2018 0.27  0.00 - 0.70 10*3/mm3 Final   • Basophils, Absolute 02/07/2018 0.04  0.00 - 0.20 10*3/mm3 Final   • Immature Grans, Absolute 02/07/2018 0.03  0.00 - 0.03 10*3/mm3 Final   • Creatinine 02/07/2018 1.74* 0.76 - 1.27 mg/dL Final   • eGFR Non African Amer 02/07/2018 41* >60 mL/min/1.73 Final   • Creatinine Clearance 02/07/2018 47.5* 97.0 - 137.0 ml/min Final   • Creatinine, Urine 02/07/2018 82.7  mg/dL Final   • Time (Hours) 02/07/2018 24  hrs Final   • Creatinine, 24H 02/07/2018 1.49  1.00 - 2.40 g/24 hr Final   • CREATININE CLEARANCE L/24 HOUR 02/07/2018 68.4* 139.7 - 197.3 L/24 hr Final     Lab Results   Component Value Date    HGBA1C 7.39 (H) 01/15/2018     Lab Results   Component Value Date    MICROALBUR 86.4 05/21/2017    CREATININE 1.81 (H) 02/07/2018    CREATININE 1.74 (H) 02/07/2018     Imaging Results (most recent)     None                Assessment and Plan:    Rocky  was seen today for diabetes.    Diagnoses and all orders for this visit:    Type 2 diabetes mellitus with complication, with long-term current use of insulin  -     Hemoglobin A1c  -     Basic Metabolic Panel  -     TSH  -     Basic Metabolic Panel; Future  -     Hemoglobin A1c; Future  -     Lipid Panel; Future  -     TSH; Future  -     Vitamin B12 & Folate; Future    Hyperlipidemia, unspecified hyperlipidemia type  -     Hemoglobin A1c  -     Basic Metabolic Panel  -     TSH  -     Basic Metabolic Panel; Future  -     Hemoglobin A1c; Future  -     Lipid Panel; Future  -     TSH; Future  -     Vitamin B12 & Folate; Future    Acquired hypothyroidism  -     Hemoglobin A1c  -     Basic Metabolic Panel  -     TSH  -     Basic Metabolic Panel; Future  -     Hemoglobin A1c; Future  -     Lipid Panel; Future  -     TSH; Future  -     Vitamin B12 & Folate; Future    Type 2 diabetes mellitus-uncontrolled  Patient has variable eating patterns which is between challenging for the management of his diabetes.  Counseled the patient that with diabetes he needs to follow a consistent plan otherwise controlling his sugars will be difficult.  Counseled to hold Humalog if he is not eating a meal.  Change tresiba to 58 units at bedtime  Continue Humalog 12 units with breakfast, 16 units with lunch and to increase Humalog to 14 units with dinner  Continue the Humalog sliding scale with each meal.    Hyperlipidemia  Will check lipid panel  Continue Lipitor and fish oil.    Hypothyroidism  Continue levothyroxine 25 µg oral daily.      Reviewed Lab results with the patient.       The total face to face time spent 14minutes with the patient,25minutes (greater than 50% of the total time) was spent counseling and coordination of the care on medication cahnges and plan.

## 2018-04-24 NOTE — PROGRESS NOTES
Mail results to pt, no treatment changes at this time. Continue insulin changes as discussed on clinic.

## 2018-07-22 ENCOUNTER — RESULTS ENCOUNTER (OUTPATIENT)
Dept: ENDOCRINOLOGY | Age: 58
End: 2018-07-22

## 2018-07-22 DIAGNOSIS — E78.5 HYPERLIPIDEMIA, UNSPECIFIED HYPERLIPIDEMIA TYPE: ICD-10-CM

## 2018-07-22 DIAGNOSIS — E11.8 TYPE 2 DIABETES MELLITUS WITH COMPLICATION, WITH LONG-TERM CURRENT USE OF INSULIN (HCC): ICD-10-CM

## 2018-07-22 DIAGNOSIS — Z79.4 TYPE 2 DIABETES MELLITUS WITH COMPLICATION, WITH LONG-TERM CURRENT USE OF INSULIN (HCC): ICD-10-CM

## 2018-07-22 DIAGNOSIS — E03.9 ACQUIRED HYPOTHYROIDISM: ICD-10-CM

## 2018-07-24 LAB
BUN SERPL-MCNC: 48 MG/DL (ref 6–20)
BUN/CREAT SERPL: 21 (ref 7–25)
CALCIUM SERPL-MCNC: 9.4 MG/DL (ref 8.6–10.5)
CHLORIDE SERPL-SCNC: 104 MMOL/L (ref 98–107)
CHOLEST SERPL-MCNC: 214 MG/DL (ref 0–200)
CO2 SERPL-SCNC: 25.4 MMOL/L (ref 22–29)
CREAT SERPL-MCNC: 2.29 MG/DL (ref 0.76–1.27)
FOLATE SERPL-MCNC: >20 NG/ML (ref 4.78–24.2)
GLUCOSE SERPL-MCNC: 172 MG/DL (ref 65–99)
HBA1C MFR BLD: 8.3 % (ref 4.8–5.6)
HDLC SERPL-MCNC: 45 MG/DL (ref 40–60)
INTERPRETATION: NORMAL
LDLC SERPL CALC-MCNC: 153 MG/DL (ref 0–100)
Lab: NORMAL
POTASSIUM SERPL-SCNC: 4.8 MMOL/L (ref 3.5–5.2)
SODIUM SERPL-SCNC: 141 MMOL/L (ref 136–145)
TRIGL SERPL-MCNC: 78 MG/DL (ref 0–150)
TSH SERPL DL<=0.005 MIU/L-ACNC: 5.46 MIU/ML (ref 0.27–4.2)
VIT B12 SERPL-MCNC: 757 PG/ML (ref 211–946)
VLDLC SERPL CALC-MCNC: 15.6 MG/DL (ref 5–40)

## 2018-08-07 ENCOUNTER — OFFICE VISIT (OUTPATIENT)
Dept: ENDOCRINOLOGY | Age: 58
End: 2018-08-07

## 2018-08-07 VITALS
HEART RATE: 76 BPM | OXYGEN SATURATION: 96 % | WEIGHT: 210 LBS | HEIGHT: 67 IN | BODY MASS INDEX: 32.96 KG/M2 | DIASTOLIC BLOOD PRESSURE: 78 MMHG | SYSTOLIC BLOOD PRESSURE: 116 MMHG

## 2018-08-07 DIAGNOSIS — E03.9 ACQUIRED HYPOTHYROIDISM: ICD-10-CM

## 2018-08-07 DIAGNOSIS — E78.5 HYPERLIPIDEMIA, UNSPECIFIED HYPERLIPIDEMIA TYPE: ICD-10-CM

## 2018-08-07 DIAGNOSIS — E11.8 TYPE 2 DIABETES MELLITUS WITH COMPLICATION, WITH LONG-TERM CURRENT USE OF INSULIN (HCC): Primary | ICD-10-CM

## 2018-08-07 DIAGNOSIS — Z79.4 TYPE 2 DIABETES MELLITUS WITH COMPLICATION, WITH LONG-TERM CURRENT USE OF INSULIN (HCC): Primary | ICD-10-CM

## 2018-08-07 PROCEDURE — 99214 OFFICE O/P EST MOD 30 MIN: CPT | Performed by: INTERNAL MEDICINE

## 2018-08-07 RX ORDER — CYCLOBENZAPRINE HCL 10 MG
10 TABLET ORAL NIGHTLY PRN
COMMUNITY
Start: 2018-07-12 | End: 2019-05-10 | Stop reason: HOSPADM

## 2018-08-07 RX ORDER — LEVOTHYROXINE SODIUM 0.05 MG/1
50 TABLET ORAL DAILY
Qty: 30 TABLET | Refills: 11 | Status: SHIPPED | OUTPATIENT
Start: 2018-08-07 | End: 2019-10-26 | Stop reason: SDUPTHER

## 2018-08-07 NOTE — PROGRESS NOTES
58 y.o.    Patient Care Team:  Karen Martinez APRN as PCP - General (Family Medicine)  Macario Finch MD as PCP - Claims Attributed    Chief Complaint:    Subjective    4 MONTH FOLLOW UP/ TYPE 2 DIABETES MELLITUS     HPI     Rocky Logan,58 y.o. WM is here as a follow up for Type 2 dm.       Type 2 dm - Diagnosed in 2000. Was started on oral agents initially. Insulin was started in 2008.   Today in clinic pt reports that he is tresiba on 58 units at bed time, he increases the dose to 60 units if his BG is around 240 mg/dl.   Also on Humalog 12/16/12 units for each meal, BF/L/D and also on moderate dose ssi - 2 units for 50 above 150 mg/dl.   On tradjenta 5 mg po daily.   Checks BG 3 - 4 times a day.   FBG - 120 - 200 mg/dl, Pre meal - 200, Bed time - 300 - 350 mg/dl.   No increased urination or thirst. No low bg episodes in the last few weeks. Did have few high BG around 300 mg/dl in the last few weeks.   Last eye exam was few weeks ago, thinks he has some dm retinopathy in his right eye and does see an [facility] ophthalmologist.   Doesn't c/o dm neuropathy but does have cramps in his b/l feet, he sees orthopedic doc for this.   History of CABG in 2008, history of CK D which is stable, history of CVA with left-sided weakness.  He is physically active.  Weight has been relatively stable.  Doesn't follow dm diet.   On Ace inhibitor.     Hyperlipidemia  On Lipitor 80 mg oral daily, fenofibrate 48 mg po daily, fish oil.       Reviewed primary care physician's/consulting physician documentation and lab results :     Interval History      The following portions of the patient's history were reviewed and updated as appropriate: allergies, current medications, past family history, past medical history, past social history, past surgical history and problem list.    Past Medical History:   Diagnosis Date   • Diabetes mellitus (CMS/HCC)    • Hypertension    • Stroke (CMS/HCC)      Family History   Problem Relation Age of  Onset   • Heart disease Mother    • Diabetes Father      Social History     Social History   • Marital status: Single     Spouse name: N/A   • Number of children: N/A   • Years of education: N/A     Occupational History   • Not on file.     Social History Main Topics   • Smoking status: Never Smoker   • Smokeless tobacco: Not on file   • Alcohol use 1.8 oz/week     3 Glasses of wine per week   • Drug use: No   • Sexual activity: Defer     Other Topics Concern   • Not on file     Social History Narrative   • No narrative on file     Allergies   Allergen Reactions   • Sulfa Antibiotics Rash       Current Outpatient Prescriptions:   •  amitriptyline (ELAVIL) 25 MG tablet, Take 25 mg by mouth Every Night., Disp: , Rfl:   •  amLODIPine (NORVASC) 10 MG tablet, Take 1 tablet by mouth Daily., Disp: 30 tablet, Rfl: 1  •  aspirin 81 MG chewable tablet, Chew 1 tablet Daily., Disp: 30 tablet, Rfl: 5  •  atorvastatin (LIPITOR) 80 MG tablet, Take 1 tablet by mouth Daily., Disp: 30 tablet, Rfl: 11  •  carvedilol (COREG) 25 MG tablet, Take 20 mg by mouth Daily., Disp: , Rfl:   •  cyclobenzaprine (FLEXERIL) 10 MG tablet, , Disp: , Rfl:   •  glucose blood (ACCU-CHEK MILENA) test strip, To check to check 3 - 4 times, Disp: 100 each, Rfl: 3  •  glucose blood (ACCU-CHEK COMPACT PLUS) test strip, To check 3 - 4 times a day, Disp: 100 each, Rfl: 2  •  glucose blood (FREESTYLE LITE) test strip, USE TO TEST BLOOD SUGAR 4 TIMES DAILY ICD 10 CODE E11.9, Disp: 200 each, Rfl: 5  •  Insulin Lispro (HUMALOG KWIKPEN) 100 UNIT/ML solution pen-injector, Inject 12 Units under the skin 3 (Three) Times a Day., Disp: , Rfl:   •  Lancets (ACCU-CHEK SOFT TOUCH) lancets, To check 3 - 4 times a day, Disp: 100 each, Rfl: 2  •  levETIRAcetam XR (KEPPRA XR) 500 MG 24 hr tablet, Take 2 tablets by mouth Daily., Disp: , Rfl:   •  levothyroxine (SYNTHROID, LEVOTHROID) 50 MCG tablet, Take 1 tablet by mouth Daily., Disp: 30 tablet, Rfl: 11  •  linagliptin (TRADJENTA)  "5 MG tablet tablet, Take 5 mg by mouth Daily., Disp: , Rfl:   •  lisinopril (PRINIVIL,ZESTRIL) 40 MG tablet, Take 1 tablet by mouth Daily., Disp: 30 tablet, Rfl: 1  •  melatonin 3 MG tablet, Take 1 tablet by mouth At Night As Needed for Sleep., Disp: , Rfl:   •  omega-3 acid ethyl esters (LOVAZA) 1 g capsule, Take 2 capsules by mouth Daily., Disp: 60 capsule, Rfl: 11  •  omeprazole (priLOSEC) 40 MG capsule, Take 40 mg by mouth Daily., Disp: , Rfl:         Review of Systems   Constitutional: Negative for appetite change and fever.   Eyes: Negative for visual disturbance.   Respiratory: Negative for shortness of breath.    Cardiovascular: Negative for palpitations and leg swelling.   Gastrointestinal: Negative for abdominal pain and vomiting.   Endocrine: Negative for polydipsia and polyuria.   Musculoskeletal: Negative for joint swelling and neck pain.   Skin: Negative for rash.   Neurological: Positive for numbness. Negative for weakness.   Psychiatric/Behavioral: Negative for behavioral problems.       Objective       Vitals:    08/07/18 1204   BP: 116/78   Pulse: 76   SpO2: 96%   Weight: 95.3 kg (210 lb)   Height: 168.9 cm (66.5\")     Body mass index is 33.39 kg/m².      Physical Exam   Constitutional: He is oriented to person, place, and time. He appears well-nourished.   obese   HENT:   Head: Normocephalic and atraumatic.   Wide neck   Eyes: Conjunctivae and EOM are normal.   Neck: Normal range of motion. Neck supple. Carotid bruit is not present. No thyromegaly present.   Acanthosis nigricans   Cardiovascular: Normal rate and normal heart sounds.    Pulmonary/Chest: Effort normal and breath sounds normal. No stridor. No respiratory distress.   Abdominal: Soft. Bowel sounds are normal. He exhibits no distension. There is no tenderness.   Central obesity   Musculoskeletal: He exhibits no edema or tenderness.   Neurological: He is alert and oriented to person, place, and time.   Skin: Skin is warm and dry. "   Psychiatric: He has a normal mood and affect. His behavior is normal.   Vitals reviewed.    Results Review:     I reviewed the patient's new clinical results and mentioned them above in HPI and in plan as well.    Medical records reviewed  Summary: done      Results Encounter on 07/22/2018   Component Date Value Ref Range Status   • Glucose 07/24/2018 172* 65 - 99 mg/dL Final   • BUN 07/24/2018 48* 6 - 20 mg/dL Final   • Creatinine 07/24/2018 2.29* 0.76 - 1.27 mg/dL Final   • eGFR Non  Am 07/24/2018 29* >60 mL/min/1.73 Final   • eGFR African Am 07/24/2018 36* >60 mL/min/1.73 Final   • BUN/Creatinine Ratio 07/24/2018 21.0  7.0 - 25.0 Final   • Sodium 07/24/2018 141  136 - 145 mmol/L Final   • Potassium 07/24/2018 4.8  3.5 - 5.2 mmol/L Final   • Chloride 07/24/2018 104  98 - 107 mmol/L Final   • Total CO2 07/24/2018 25.4  22.0 - 29.0 mmol/L Final   • Calcium 07/24/2018 9.4  8.6 - 10.5 mg/dL Final   • Hemoglobin A1C 07/24/2018 8.30* 4.80 - 5.60 % Final    Comment: Hemoglobin A1C Ranges:  Increased Risk for Diabetes  5.7% to 6.4%  Diabetes                     >= 6.5%  Diabetic Goal                < 7.0%     • Total Cholesterol 07/24/2018 214* 0 - 200 mg/dL Final   • Triglycerides 07/24/2018 78  0 - 150 mg/dL Final   • HDL Cholesterol 07/24/2018 45  40 - 60 mg/dL Final   • VLDL Cholesterol 07/24/2018 15.6  5 - 40 mg/dL Final   • LDL Cholesterol  07/24/2018 153* 0 - 100 mg/dL Final   • TSH 07/24/2018 5.460* 0.270 - 4.200 mIU/mL Final   • Vitamin B-12 07/24/2018 757  211 - 946 pg/mL Final   • Folate 07/24/2018 >20.00  4.78 - 24.20 ng/mL Final   • Interpretation 07/24/2018 Note   Final    Supplemental report is available.   • PDF Image 07/24/2018 Not applicable   Final     Lab Results   Component Value Date    HGBA1C 8.30 (H) 07/24/2018    HGBA1C 7.90 (H) 04/23/2018    HGBA1C 7.39 (H) 01/15/2018     Lab Results   Component Value Date    MICROALBUR 86.4 05/21/2017    CREATININE 2.29 (H) 07/24/2018     Imaging  "Results (most recent)     None                Assessment and Plan:    Rocky was seen today for diabetes.    Diagnoses and all orders for this visit:    Type 2 diabetes mellitus with complication, with long-term current use of insulin (CMS/[facility])  -     TSH; Future  -     T4, Free; Future  -     Hemoglobin A1c; Future  -     Basic Metabolic Panel; Future  -     Lipid Panel; Future  -     Vitamin B12 & Folate; Future  -     Ambulatory Referral to Diabetic Education    Hyperlipidemia, unspecified hyperlipidemia type  -     TSH; Future  -     T4, Free; Future  -     Hemoglobin A1c; Future  -     Basic Metabolic Panel; Future  -     Lipid Panel; Future  -     Vitamin B12 & Folate; Future    Acquired hypothyroidism  -     TSH; Future  -     T4, Free; Future  -     Hemoglobin A1c; Future  -     Basic Metabolic Panel; Future  -     Lipid Panel; Future  -     Vitamin B12 & Folate; Future    Other orders  -     levothyroxine (SYNTHROID, LEVOTHROID) 50 MCG tablet; Take 1 tablet by mouth Daily.      Type 2 Diabetes mellitus-uncontrolled  HbA1c worse since last visit.  Stop tresiba  Will start patient on Toujeo 30 units in the morning and 60 units at bedtime.  Will increase Humalog to 14 units with breakfast, 18 units with lunch and 16 units with dinner.  Will start patient on high dose Humalog sliding scale 3 times a day before meals and at bedtime.  Will refer the patient to diabetic educator to educate him about diet restrictions.    Hyperlipidemia  Continue Lipitor  Fenofibrate will be stopped due to pt renal function  Continue vascepa.    Hypothyroidism  Will increase the dosage of levothyroxine to 50 µg oral daily.    Reviewed Lab results with the patient.             Kamran Carrasco MD  08/07/18    [facility] Dragon / transcription disclaimer:     \"Dictated utilizing Dragon dictation\".  "

## 2018-10-30 ENCOUNTER — LAB (OUTPATIENT)
Dept: ENDOCRINOLOGY | Age: 58
End: 2018-10-30

## 2018-10-30 DIAGNOSIS — E03.9 ACQUIRED HYPOTHYROIDISM: ICD-10-CM

## 2018-10-30 DIAGNOSIS — E78.5 HYPERLIPIDEMIA, UNSPECIFIED HYPERLIPIDEMIA TYPE: ICD-10-CM

## 2018-10-30 DIAGNOSIS — Z79.4 TYPE 2 DIABETES MELLITUS WITH COMPLICATION, WITH LONG-TERM CURRENT USE OF INSULIN (HCC): ICD-10-CM

## 2018-10-30 DIAGNOSIS — E11.8 TYPE 2 DIABETES MELLITUS WITH COMPLICATION, WITH LONG-TERM CURRENT USE OF INSULIN (HCC): ICD-10-CM

## 2018-10-30 LAB
BUN SERPL-MCNC: 24 MG/DL (ref 6–20)
BUN/CREAT SERPL: 13.7 (ref 7–25)
CALCIUM SERPL-MCNC: 9.3 MG/DL (ref 8.6–10.5)
CHLORIDE SERPL-SCNC: 106 MMOL/L (ref 98–107)
CHOLEST SERPL-MCNC: 179 MG/DL (ref 0–200)
CO2 SERPL-SCNC: 22.8 MMOL/L (ref 22–29)
CREAT SERPL-MCNC: 1.75 MG/DL (ref 0.76–1.27)
FOLATE SERPL-MCNC: >20 NG/ML (ref 4.78–24.2)
GLUCOSE SERPL-MCNC: 172 MG/DL (ref 65–99)
HBA1C MFR BLD: 7.21 % (ref 4.8–5.6)
HDLC SERPL-MCNC: 35 MG/DL (ref 40–60)
INTERPRETATION: NORMAL
LDLC SERPL CALC-MCNC: 117 MG/DL (ref 0–100)
Lab: NORMAL
POTASSIUM SERPL-SCNC: 4.6 MMOL/L (ref 3.5–5.2)
SODIUM SERPL-SCNC: 141 MMOL/L (ref 136–145)
T4 FREE SERPL-MCNC: 1.18 NG/DL (ref 0.93–1.7)
TRIGL SERPL-MCNC: 133 MG/DL (ref 0–150)
TSH SERPL DL<=0.005 MIU/L-ACNC: 3.7 MIU/ML (ref 0.27–4.2)
VIT B12 SERPL-MCNC: 697 PG/ML (ref 211–946)
VLDLC SERPL CALC-MCNC: 26.6 MG/DL (ref 5–40)

## 2018-11-13 ENCOUNTER — OFFICE VISIT (OUTPATIENT)
Dept: ENDOCRINOLOGY | Age: 58
End: 2018-11-13

## 2018-11-13 VITALS
DIASTOLIC BLOOD PRESSURE: 82 MMHG | BODY MASS INDEX: 35.52 KG/M2 | OXYGEN SATURATION: 99 % | HEIGHT: 66 IN | HEART RATE: 74 BPM | SYSTOLIC BLOOD PRESSURE: 130 MMHG | WEIGHT: 221 LBS

## 2018-11-13 DIAGNOSIS — E03.9 ACQUIRED HYPOTHYROIDISM: ICD-10-CM

## 2018-11-13 DIAGNOSIS — E11.8 TYPE 2 DIABETES MELLITUS WITH COMPLICATION, WITH LONG-TERM CURRENT USE OF INSULIN (HCC): Primary | ICD-10-CM

## 2018-11-13 DIAGNOSIS — E78.5 HYPERLIPIDEMIA, UNSPECIFIED HYPERLIPIDEMIA TYPE: ICD-10-CM

## 2018-11-13 DIAGNOSIS — Z79.4 TYPE 2 DIABETES MELLITUS WITH COMPLICATION, WITH LONG-TERM CURRENT USE OF INSULIN (HCC): Primary | ICD-10-CM

## 2018-11-13 PROCEDURE — 99214 OFFICE O/P EST MOD 30 MIN: CPT | Performed by: INTERNAL MEDICINE

## 2018-11-13 NOTE — PROGRESS NOTES
58 y.o.    Patient Care Team:  Karen Martinez APRN as PCP - General (Family Medicine)  Macario Finch MD as PCP - Claims Attributed    Chief Complaint:    3 MONTH FOLLOW UP/ TYPE 2 DIABETES MELLITUS  Subjective     HPI    Rocky Logan,58 y.o. WM is here as a follow up for Type 2 dm.       Type 2 dm - Diagnosed in 2000. Was started on oral agents initially. Insulin was started in 2008.   Today in clinic pt reports that he is on toujeo 30 units in the morning and 60 units at bed time.   Humalog 14/16/18 with each meal and high dose ssi 3 units for 50 above 150 mg/dl. Also on Tradjenta 5 mg oral daily.  He has been holding his Humalog dosage when his blood sugar is less than 1 50 mg/dL range.  Checks BG 4 - 5  times a day.   FBG - 140 - 170 mg/dl, Pre meals - 200 - 300 mg/dl.   No increased urination or thirst.   No BG less than 60 mg/dl, did have few Bg around 100 mg/dl. Decreased hypoglycemic awareness.   Did have few BG around 300 mg/dl.   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   Does c/o dm neuropathy. No ulcers or amputations of his toes in his b/l feet.   History of CABG in 2008, history of CK D which is stable, history of CVA with left-sided weakness.  He is physically active.  Weight has been relatively stable.  Doesn't follow dm diet.   On Ace inhibitor.     Hyperlipidemia  On Lipitor 80 mg oral daily, fenofibrate 48 mg po daily, fish oil.     Hypothyroidism -   On levotrhyroxine 50 mcg oral daily.      Reviewed primary care physician's/consulting physician documentation and lab results :     Interval History      The following portions of the patient's history were reviewed and updated as appropriate: allergies, current medications, past family history, past medical history, past social history, past surgical history and problem list.    Past Medical History:   Diagnosis Date    • Diabetes mellitus (CMS/[facility])    • Hypertension    • Stroke (CMS/[facility])      Family History   Problem Relation Age of Onset   • Heart disease Mother    • Diabetes Father      Social History     Socioeconomic History   • Marital status: Single     Spouse name: Not on file   • Number of children: Not on file   • Years of education: Not on file   • Highest education level: Not on file   Social Needs   • Financial resource strain: Not on file   • Food insecurity - worry: Not on file   • Food insecurity - inability: Not on file   • Transportation needs - medical: Not on file   • Transportation needs - non-medical: Not on file   Occupational History   • Not on file   Tobacco Use   • Smoking status: Never Smoker   Substance and Sexual Activity   • Alcohol use: Yes     Alcohol/week: 1.8 oz     Types: 3 Glasses of wine per week   • Drug use: No   • Sexual activity: Defer   Other Topics Concern   • Not on file   Social History Narrative   • Not on file     Allergies   Allergen Reactions   • Sulfa Antibiotics Rash       Current Outpatient Medications:   •  amLODIPine (NORVASC) 10 MG tablet, Take 1 tablet by mouth Daily., Disp: 30 tablet, Rfl: 1  •  aspirin 81 MG chewable tablet, Chew 1 tablet Daily., Disp: 30 tablet, Rfl: 5  •  carvedilol (COREG) 25 MG tablet, Take 20 mg by mouth Daily., Disp: , Rfl:   •  glucose blood (FREESTYLE LITE) test strip, USE TO TEST BLOOD SUGAR 4 TIMES DAILY ICD 10 CODE E11.9, Disp: 200 each, Rfl: 5  •  Insulin Glargine (TOUJEO SOLOSTAR) 300 UNIT/ML solution pen-injector, Inject 30 Units under the skin into the appropriate area as directed Every Morning. and 60 units at bedtime., Disp: 10 pen, Rfl: 3  •  Insulin Lispro (HUMALOG KWIKPEN) 100 UNIT/ML solution pen-injector, Inject 14 Units under the skin into the appropriate area as directed 3 (Three) Times a Day. WITH BF 16 UNIT WITH LUNCH 18 UNITS WITH DINNER, Disp: , Rfl:   •  Lancets (ACCU-CHEK SOFT TOUCH) lancets, To check 3 - 4 times a day, Disp:  "100 each, Rfl: 2  •  levETIRAcetam XR (KEPPRA XR) 500 MG 24 hr tablet, Take 2 tablets by mouth Daily., Disp: , Rfl:   •  levothyroxine (SYNTHROID, LEVOTHROID) 50 MCG tablet, Take 1 tablet by mouth Daily., Disp: 30 tablet, Rfl: 11  •  linagliptin (TRADJENTA) 5 MG tablet tablet, Take 5 mg by mouth Daily., Disp: , Rfl:   •  lisinopril (PRINIVIL,ZESTRIL) 40 MG tablet, Take 1 tablet by mouth Daily., Disp: 30 tablet, Rfl: 1  •  melatonin 3 MG tablet, Take 1 tablet by mouth At Night As Needed for Sleep., Disp: , Rfl:   •  omeprazole (priLOSEC) 40 MG capsule, Take 40 mg by mouth Daily., Disp: , Rfl:   •  amitriptyline (ELAVIL) 25 MG tablet, Take 25 mg by mouth Every Night., Disp: , Rfl:   •  atorvastatin (LIPITOR) 80 MG tablet, Take 1 tablet by mouth Daily., Disp: 30 tablet, Rfl: 11  •  cyclobenzaprine (FLEXERIL) 10 MG tablet, , Disp: , Rfl:   •  glucose blood (ACCU-CHEK MILENA) test strip, To check to check 3 - 4 times, Disp: 100 each, Rfl: 3  •  glucose blood (ACCU-CHEK COMPACT PLUS) test strip, To check 3 - 4 times a day, Disp: 100 each, Rfl: 2  •  omega-3 acid ethyl esters (LOVAZA) 1 g capsule, Take 2 capsules by mouth Daily., Disp: 60 capsule, Rfl: 11        Review of Systems   Constitutional: Positive for fatigue. Negative for appetite change and fever.   Eyes: Negative for visual disturbance.   Respiratory: Negative for shortness of breath.    Cardiovascular: Negative for palpitations and leg swelling.   Gastrointestinal: Negative for abdominal pain and vomiting.   Endocrine: Negative for polydipsia and polyuria.   Musculoskeletal: Negative for joint swelling and neck pain.   Skin: Negative for rash.   Neurological: Positive for numbness. Negative for weakness.   Psychiatric/Behavioral: Negative for behavioral problems.       Objective       Vitals:    11/13/18 1040   BP: 130/82   Pulse: 74   SpO2: 99%   Weight: 100 kg (221 lb)   Height: 167.6 cm (66\")     Body mass index is 35.67 kg/m².      Physical Exam  Results " Review:     I reviewed the patient's new clinical results and mentioned them above in HPI and in plan as well.    Medical records reviewed  Summary:done      Lab on 10/30/2018   Component Date Value Ref Range Status   • TSH 10/30/2018 3.700  0.270 - 4.200 mIU/mL Final   • Free T4 10/30/2018 1.18  0.93 - 1.70 ng/dL Final   • Hemoglobin A1C 10/30/2018 7.21* 4.80 - 5.60 % Final    Comment: Hemoglobin A1C Ranges:  Increased Risk for Diabetes  5.7% to 6.4%  Diabetes                     >= 6.5%  Diabetic Goal                < 7.0%     • Glucose 10/30/2018 172* 65 - 99 mg/dL Final   • BUN 10/30/2018 24* 6 - 20 mg/dL Final   • Creatinine 10/30/2018 1.75* 0.76 - 1.27 mg/dL Final   • eGFR Non African Am 10/30/2018 40* >60 mL/min/1.73 Final   • eGFR African Am 10/30/2018 49* >60 mL/min/1.73 Final   • BUN/Creatinine Ratio 10/30/2018 13.7  7.0 - 25.0 Final   • Sodium 10/30/2018 141  136 - 145 mmol/L Final   • Potassium 10/30/2018 4.6  3.5 - 5.2 mmol/L Final   • Chloride 10/30/2018 106  98 - 107 mmol/L Final   • Total CO2 10/30/2018 22.8  22.0 - 29.0 mmol/L Final   • Calcium 10/30/2018 9.3  8.6 - 10.5 mg/dL Final   • Total Cholesterol 10/30/2018 179  0 - 200 mg/dL Final   • Triglycerides 10/30/2018 133  0 - 150 mg/dL Final   • HDL Cholesterol 10/30/2018 35* 40 - 60 mg/dL Final   • VLDL Cholesterol 10/30/2018 26.6  5 - 40 mg/dL Final   • LDL Cholesterol  10/30/2018 117* 0 - 100 mg/dL Final   • Vitamin B-12 10/30/2018 697  211 - 946 pg/mL Final   • Folate 10/30/2018 >20.00  4.78 - 24.20 ng/mL Final   • Interpretation 10/30/2018 Note   Final    Supplemental report is available.   • PDF Image 10/30/2018 Not applicable   Final     Lab Results   Component Value Date    HGBA1C 7.21 (H) 10/30/2018    HGBA1C 8.30 (H) 07/24/2018    HGBA1C 7.90 (H) 04/23/2018     Lab Results   Component Value Date    MICROALBUR 86.4 05/21/2017    CREATININE 1.75 (H) 10/30/2018     Imaging Results (most recent)     None                Assessment and  "Plan:    Rocky was seen today for diabetes.    Diagnoses and all orders for this visit:    Type 2 diabetes mellitus with complication, with long-term current use of insulin (CMS/[facility])  -     Basic Metabolic Panel; Future  -     Hemoglobin A1c; Future    Hyperlipidemia, unspecified hyperlipidemia type    Acquired hypothyroidism      Type 2 diabetes mellitus-uncontrolled  Continue Toujeo 30 units in the morning and 60 units at bedtime  Continue Humalog 14 units with breakfast, 16 units with lunch and 18 units with dinner   continue high dose Humalog sliding scale with each meal  Advised the patient to hold the Humalog if his blood sugar is less than 80 mg/dL  Give half the dose of Humalog if his blood sugar is less than 100 mg/dL  Strongly emphasized to the patient not to hold his Humalog if his blood sugar is less than 1 50 mg/dL range.    Hyperlipidemia  Continue Lipitor and fish oiL  Given his kidney function will hold off on the fenofibrate    Hypothyroidism  Continue levothyroxine 50 µg oral daily    Discussed about Repatha to the patient.  He wants to discuss with his cardiologist about this.    Reviewed Lab results with the patient.             Kamran Carrasco MD  11/13/18    EMR Dragon / transcription disclaimer:     \"Dictated utilizing Dragon dictation\".  "

## 2018-11-13 NOTE — PATIENT INSTRUCTIONS
Make sure to stop fenofibrate.   Cont lipitor and fish oil - omega.     Diabetes -   Toujeo 30 units in the am and 60 units in the bed time.  Continue Humalog 14/16/18 units with each meal  Continue high dose sliding scale with each meal-3 units for 50 about 1 50 mg/dL range.  Humalog if his blood sugar is less than or equal to 80 mg/dL  Give half the dose of Humalog if blood sugar is less than or equal to 100 mg/dL range.  Patient will be taking the above doses of Humalog if his blood sugar is less than or equal to 1 50 mg/dL range.  Blood sugars for the patient are supposed to be between 80-1 80 mg/dL range.

## 2019-01-10 RX ORDER — INSULIN GLARGINE 300 U/ML
INJECTION, SOLUTION SUBCUTANEOUS
Qty: 3 PEN | Refills: 2 | Status: SHIPPED | OUTPATIENT
Start: 2019-01-10 | End: 2019-05-10 | Stop reason: HOSPADM

## 2019-01-31 ENCOUNTER — LAB (OUTPATIENT)
Dept: ENDOCRINOLOGY | Age: 59
End: 2019-01-31

## 2019-01-31 DIAGNOSIS — E11.8 TYPE 2 DIABETES MELLITUS WITH COMPLICATION, WITH LONG-TERM CURRENT USE OF INSULIN (HCC): ICD-10-CM

## 2019-01-31 DIAGNOSIS — Z79.4 TYPE 2 DIABETES MELLITUS WITH COMPLICATION, WITH LONG-TERM CURRENT USE OF INSULIN (HCC): ICD-10-CM

## 2019-02-01 LAB
BUN SERPL-MCNC: 22 MG/DL (ref 6–20)
BUN/CREAT SERPL: 11.1 (ref 7–25)
CALCIUM SERPL-MCNC: 9 MG/DL (ref 8.6–10.5)
CHLORIDE SERPL-SCNC: 106 MMOL/L (ref 98–107)
CO2 SERPL-SCNC: 23.7 MMOL/L (ref 22–29)
CREAT SERPL-MCNC: 1.99 MG/DL (ref 0.76–1.27)
GLUCOSE SERPL-MCNC: 109 MG/DL (ref 65–99)
HBA1C MFR BLD: 7.4 % (ref 4.8–5.6)
POTASSIUM SERPL-SCNC: 4.5 MMOL/L (ref 3.5–5.2)
SODIUM SERPL-SCNC: 143 MMOL/L (ref 136–145)

## 2019-02-13 ENCOUNTER — OFFICE VISIT (OUTPATIENT)
Dept: ENDOCRINOLOGY | Age: 59
End: 2019-02-13

## 2019-02-13 VITALS
BODY MASS INDEX: 35.28 KG/M2 | HEIGHT: 67 IN | DIASTOLIC BLOOD PRESSURE: 90 MMHG | SYSTOLIC BLOOD PRESSURE: 140 MMHG | HEART RATE: 72 BPM | WEIGHT: 224.8 LBS

## 2019-02-13 DIAGNOSIS — E03.9 ACQUIRED HYPOTHYROIDISM: ICD-10-CM

## 2019-02-13 DIAGNOSIS — E78.5 HYPERLIPIDEMIA, UNSPECIFIED HYPERLIPIDEMIA TYPE: ICD-10-CM

## 2019-02-13 DIAGNOSIS — E11.8 TYPE 2 DIABETES MELLITUS WITH COMPLICATION, WITH LONG-TERM CURRENT USE OF INSULIN (HCC): Primary | ICD-10-CM

## 2019-02-13 DIAGNOSIS — Z79.4 TYPE 2 DIABETES MELLITUS WITH COMPLICATION, WITH LONG-TERM CURRENT USE OF INSULIN (HCC): Primary | ICD-10-CM

## 2019-02-13 PROCEDURE — 99214 OFFICE O/P EST MOD 30 MIN: CPT | Performed by: INTERNAL MEDICINE

## 2019-02-13 NOTE — PROGRESS NOTES
Chief Complaint   Patient presents with   • Diabetes   FOLLOW UP/ TYPE 2 DIABETES MELLITUS     Rocky Logan Jr. 58 y.o. presents with Type 2 dm as a f/u patient.         Type 2 dm - Diagnosed in 2000. Was started on oral agents initially. Insulin was started in 2008.   Today in the clinic pt reports that Toujeo 30 units in the morning and 60 units at bed time. Some he does 15 units in the am when the pen has only that much left instead of giving 2 times.   Humalog 14/16/18 units with each meal, high ssi 3 units for 50 above 150 mg/dl.   Also on Tradjenta 5 mg oral daily.    Has been checking BG  4 times.   FBG - 90 - 180, Pre meal - 160 - 200.  Highest BG was around 393 mg/dl.   Lowest BG was around 70 mg/dl, felt like low BG when this happened.   Does have dm retinopathy, currently getting the treatment for the this.   Does c/o dm neuropathy. No ulcers or amputations of his toes in his b/l feet.   History of CABG in 2008, history of CK D which is stable, history of CVA with left-sided weakness.  He is physically active.  Weight has been relatively stable.  Trying to follow dm diet, recently had dm education with Candida Freire.   On Ace inhibitor.     Hyperlipidemia  On Lipitor 80 mg oral daily, fenofibrate 48 mg po daily, fish oil.      Hypothyroidism -   On levotrhyroxine 50 mcg oral daily.        Reviewed primary care physician's/consulting physician documentation and lab results :     I have reviewed the patient's allergies, medicines, past medical hx, family hx and social hx in detail.    Past Medical History:   Diagnosis Date   • Diabetes mellitus (CMS/[facility])    • Hypertension    • Stroke (CMS/[facility])        Family History   Problem Relation Age of Onset   • Heart disease Mother    • Diabetes Father        Social History     Socioeconomic History   • Marital status: Single     Spouse name: Not on file   • Number of children: Not on file   • Years of education: Not on file   • Highest education level: Not on file    Social Needs   • Financial resource strain: Not on file   • Food insecurity - worry: Not on file   • Food insecurity - inability: Not on file   • Transportation needs - medical: Not on file   • Transportation needs - non-medical: Not on file   Occupational History   • Not on file   Tobacco Use   • Smoking status: Never Smoker   • Smokeless tobacco: Never Used   Substance and Sexual Activity   • Alcohol use: Yes     Alcohol/week: 1.8 oz     Types: 3 Glasses of wine per week   • Drug use: No   • Sexual activity: Defer   Other Topics Concern   • Not on file   Social History Narrative   • Not on file       Allergies   Allergen Reactions   • Sulfa Antibiotics Rash         Current Outpatient Medications:   •  amitriptyline (ELAVIL) 25 MG tablet, Take 25 mg by mouth Every Night., Disp: , Rfl:   •  amLODIPine (NORVASC) 10 MG tablet, Take 1 tablet by mouth Daily., Disp: 30 tablet, Rfl: 1  •  aspirin 81 MG chewable tablet, Chew 1 tablet Daily., Disp: 30 tablet, Rfl: 5  •  atorvastatin (LIPITOR) 80 MG tablet, Take 1 tablet by mouth Daily., Disp: 30 tablet, Rfl: 11  •  carvedilol (COREG) 25 MG tablet, Take 20 mg by mouth Daily., Disp: , Rfl:   •  glucose blood (ACCU-CHEK MILENA) test strip, To check to check 3 - 4 times, Disp: 100 each, Rfl: 3  •  glucose blood (ACCU-CHEK COMPACT PLUS) test strip, To check 3 - 4 times a day, Disp: 100 each, Rfl: 2  •  glucose blood (FREESTYLE LITE) test strip, USE TO TEST BLOOD SUGAR 4 TIMES DAILY ICD 10 CODE E11.9, Disp: 200 each, Rfl: 5  •  Insulin Lispro (HUMALOG KWIKPEN) 100 UNIT/ML solution pen-injector, Inject 14 Units under the skin into the appropriate area as directed 3 (Three) Times a Day. WITH BF 16 UNIT WITH LUNCH 18 UNITS WITH DINNER, Disp: , Rfl:   •  Lancets (ACCU-CHEK SOFT TOUCH) lancets, To check 3 - 4 times a day, Disp: 100 each, Rfl: 2  •  levETIRAcetam XR (KEPPRA XR) 500 MG 24 hr tablet, Take 2 tablets by mouth Daily., Disp: , Rfl:   •  levothyroxine (SYNTHROID, LEVOTHROID)  "50 MCG tablet, Take 1 tablet by mouth Daily., Disp: 30 tablet, Rfl: 11  •  linagliptin (TRADJENTA) 5 MG tablet tablet, Take 5 mg by mouth Daily., Disp: , Rfl:   •  lisinopril (PRINIVIL,ZESTRIL) 40 MG tablet, Take 1 tablet by mouth Daily., Disp: 30 tablet, Rfl: 1  •  omega-3 acid ethyl esters (LOVAZA) 1 g capsule, Take 2 capsules by mouth Daily., Disp: 60 capsule, Rfl: 11  •  omeprazole (priLOSEC) 40 MG capsule, Take 40 mg by mouth Daily., Disp: , Rfl:   •  TOUJEO SOLOSTAR 300 UNIT/ML solution pen-injector, INJECT 30 UNITS SUBCUTANEOUSLY EVERY MORNING AND 60 UNITS EVERY NIGHT AT BEDTIME, Disp: 3 pen, Rfl: 2  •  cyclobenzaprine (FLEXERIL) 10 MG tablet, , Disp: , Rfl:     Review of Systems   Constitutional: Negative for chills, fatigue and fever.   Cardiovascular: Negative for chest pain and palpitations.   Gastrointestinal: Positive for nausea and vomiting. Negative for abdominal pain, constipation and diarrhea.   Endocrine: Negative for cold intolerance and heat intolerance.       Objective:     /90   Pulse 72   Ht 168.9 cm (66.5\")   Wt 102 kg (224 lb 12.8 oz)   BMI 35.74 kg/m²     Physical Exam   Constitutional: He is oriented to person, place, and time. He appears well-nourished.   obese   HENT:   Head: Normocephalic and atraumatic.   Wide neck   Eyes: Conjunctivae and EOM are normal.   Neck: Normal range of motion. Neck supple. Carotid bruit is not present. No thyromegaly present.   Acanthosis nigricans   Cardiovascular: Normal rate and normal heart sounds.   Pulmonary/Chest: Effort normal and breath sounds normal. No stridor. No respiratory distress.   Abdominal: Soft. Bowel sounds are normal. He exhibits no distension. There is no tenderness.   Central obesity   Musculoskeletal: He exhibits no edema or tenderness.   Neurological: He is alert and oriented to person, place, and time.   Skin: Skin is warm and dry.   Psychiatric: He has a normal mood and affect. His behavior is normal.   Vitals " reviewed.         Results Review:    I reviewed the patient's new clinical results.    Lab on 01/31/2019   Component Date Value Ref Range Status   • Glucose 01/31/2019 109* 65 - 99 mg/dL Final   • BUN 01/31/2019 22* 6 - 20 mg/dL Final   • Creatinine 01/31/2019 1.99* 0.76 - 1.27 mg/dL Final   • eGFR Non African Am 01/31/2019 35* >60 mL/min/1.73 Final   • eGFR African Am 01/31/2019 42* >60 mL/min/1.73 Final   • BUN/Creatinine Ratio 01/31/2019 11.1  7.0 - 25.0 Final   • Sodium 01/31/2019 143  136 - 145 mmol/L Final   • Potassium 01/31/2019 4.5  3.5 - 5.2 mmol/L Final   • Chloride 01/31/2019 106  98 - 107 mmol/L Final   • Total CO2 01/31/2019 23.7  22.0 - 29.0 mmol/L Final   • Calcium 01/31/2019 9.0  8.6 - 10.5 mg/dL Final   • Hemoglobin A1C 01/31/2019 7.40* 4.80 - 5.60 % Final    Comment: Hemoglobin A1C Ranges:  Increased Risk for Diabetes  5.7% to 6.4%  Diabetes                     >= 6.5%  Diabetic Goal                < 7.0%                     Rocky was seen today for diabetes.    Diagnoses and all orders for this visit:    Type 2 diabetes mellitus with complication, with long-term current use of insulin (CMS/[facility])  -     Basic Metabolic Panel; Future  -     Hemoglobin A1c; Future  -     Lipid Panel; Future  -     TSH; Future  -     Vitamin B12 & Folate; Future  -     T4, Free; Future    Hyperlipidemia, unspecified hyperlipidemia type  -     Basic Metabolic Panel; Future  -     Hemoglobin A1c; Future  -     Lipid Panel; Future  -     TSH; Future  -     Vitamin B12 & Folate; Future  -     T4, Free; Future    Acquired hypothyroidism  -     Basic Metabolic Panel; Future  -     Hemoglobin A1c; Future  -     Lipid Panel; Future  -     TSH; Future  -     Vitamin B12 & Folate; Future  -     T4, Free; Future      Type 2 dm - uncontrolled.   Hba1c worse since last visit.   Continue the Humalog at the same dose.  Increase Toujeo to 35 units in the am and 60 units at bed time.   Discussed the benefits of  "DEXCOM.    Hyperlipidemia   Continue lipitor, Fenofibrate and fish oil.   Discussed about repatha to the pt.     Hypothyroid  Continue levothyroxine 50 mcg oral daily.        Thank you for asking me to see your patient, Rocky DAVENPORT Desmond Vidal in consultation.        Kamran Carrasco MD  02/13/19    EMR Dragon / transcription disclaimer:     \"Dictated utilizing Dragon dictation\".   "

## 2019-04-02 RX ORDER — OMEGA-3-ACID ETHYL ESTERS 1 G/1
CAPSULE, LIQUID FILLED ORAL
Qty: 60 CAPSULE | Refills: 10 | Status: SHIPPED | OUTPATIENT
Start: 2019-04-02

## 2019-04-04 RX ORDER — ATORVASTATIN CALCIUM 80 MG/1
TABLET, FILM COATED ORAL
Qty: 90 TABLET | Refills: 10 | Status: SHIPPED | OUTPATIENT
Start: 2019-04-04

## 2019-05-03 ENCOUNTER — HOSPITAL ENCOUNTER (INPATIENT)
Facility: HOSPITAL | Age: 59
LOS: 7 days | Discharge: NURSING, REHAB OR LONG-TERM CARE | End: 2019-05-10
Attending: EMERGENCY MEDICINE | Admitting: INTERNAL MEDICINE

## 2019-05-03 ENCOUNTER — APPOINTMENT (OUTPATIENT)
Dept: MRI IMAGING | Facility: HOSPITAL | Age: 59
End: 2019-05-03

## 2019-05-03 ENCOUNTER — APPOINTMENT (OUTPATIENT)
Dept: CT IMAGING | Facility: HOSPITAL | Age: 59
End: 2019-05-03

## 2019-05-03 DIAGNOSIS — R25.2 SPASTICITY AS LATE EFFECT OF CEREBROVASCULAR ACCIDENT (CVA): ICD-10-CM

## 2019-05-03 DIAGNOSIS — R29.898 LEFT LEG WEAKNESS: Primary | ICD-10-CM

## 2019-05-03 DIAGNOSIS — R53.1 WEAKNESS GENERALIZED: ICD-10-CM

## 2019-05-03 DIAGNOSIS — I69.398 SPASTICITY AS LATE EFFECT OF CEREBROVASCULAR ACCIDENT (CVA): ICD-10-CM

## 2019-05-03 DIAGNOSIS — R13.12 OROPHARYNGEAL DYSPHAGIA: ICD-10-CM

## 2019-05-03 DIAGNOSIS — I63.50 RIGHT PONTINE STROKE (HCC): ICD-10-CM

## 2019-05-03 PROBLEM — N18.30 CKD (CHRONIC KIDNEY DISEASE) STAGE 3, GFR 30-59 ML/MIN (HCC): Chronic | Status: ACTIVE | Noted: 2019-05-03

## 2019-05-03 PROBLEM — Z86.73 HISTORY OF STROKE: Chronic | Status: ACTIVE | Noted: 2019-05-03

## 2019-05-03 PROBLEM — K21.9 GERD WITHOUT ESOPHAGITIS: Chronic | Status: ACTIVE | Noted: 2019-05-03

## 2019-05-03 PROBLEM — Z86.011 HISTORY OF MENINGIOMA OF THE BRAIN: Chronic | Status: ACTIVE | Noted: 2019-05-03

## 2019-05-03 PROBLEM — I10 HTN (HYPERTENSION): Chronic | Status: ACTIVE | Noted: 2019-05-03

## 2019-05-03 PROBLEM — G45.9 TRANSIENT ISCHEMIC ATTACK (TIA): Status: ACTIVE | Noted: 2019-05-03

## 2019-05-03 LAB
ANION GAP SERPL CALCULATED.3IONS-SCNC: 11.2 MMOL/L
BASOPHILS # BLD AUTO: 0.04 10*3/MM3 (ref 0–0.2)
BASOPHILS NFR BLD AUTO: 0.5 % (ref 0–1.5)
BUN BLD-MCNC: 26 MG/DL (ref 6–20)
BUN/CREAT SERPL: 13 (ref 7–25)
CALCIUM SPEC-SCNC: 8.8 MG/DL (ref 8.6–10.5)
CHLORIDE SERPL-SCNC: 111 MMOL/L (ref 98–107)
CO2 SERPL-SCNC: 22.8 MMOL/L (ref 22–29)
CREAT BLD-MCNC: 2 MG/DL (ref 0.76–1.27)
DEPRECATED RDW RBC AUTO: 41 FL (ref 37–54)
EOSINOPHIL # BLD AUTO: 0.35 10*3/MM3 (ref 0–0.4)
EOSINOPHIL NFR BLD AUTO: 4.3 % (ref 0.3–6.2)
ERYTHROCYTE [DISTWIDTH] IN BLOOD BY AUTOMATED COUNT: 13.3 % (ref 12.3–15.4)
GFR SERPL CREATININE-BSD FRML MDRD: 34 ML/MIN/1.73
GLUCOSE BLD-MCNC: 131 MG/DL (ref 65–99)
GLUCOSE BLDC GLUCOMTR-MCNC: 197 MG/DL (ref 70–130)
GLUCOSE BLDC GLUCOMTR-MCNC: 75 MG/DL (ref 70–130)
HCT VFR BLD AUTO: 33.8 % (ref 37.5–51)
HGB BLD-MCNC: 11.7 G/DL (ref 13–17.7)
IMM GRANULOCYTES # BLD AUTO: 0.03 10*3/MM3 (ref 0–0.05)
IMM GRANULOCYTES NFR BLD AUTO: 0.4 % (ref 0–0.5)
LYMPHOCYTES # BLD AUTO: 1.05 10*3/MM3 (ref 0.7–3.1)
LYMPHOCYTES NFR BLD AUTO: 13 % (ref 19.6–45.3)
MCH RBC QN AUTO: 29.7 PG (ref 26.6–33)
MCHC RBC AUTO-ENTMCNC: 34.6 G/DL (ref 31.5–35.7)
MCV RBC AUTO: 85.8 FL (ref 79–97)
MONOCYTES # BLD AUTO: 0.8 10*3/MM3 (ref 0.1–0.9)
MONOCYTES NFR BLD AUTO: 9.9 % (ref 5–12)
NEUTROPHILS # BLD AUTO: 5.83 10*3/MM3 (ref 1.7–7)
NEUTROPHILS NFR BLD AUTO: 71.9 % (ref 42.7–76)
NRBC BLD AUTO-RTO: 0 /100 WBC (ref 0–0.2)
PLATELET # BLD AUTO: 168 10*3/MM3 (ref 140–450)
PMV BLD AUTO: 10.6 FL (ref 6–12)
POTASSIUM BLD-SCNC: 4.8 MMOL/L (ref 3.5–5.2)
RBC # BLD AUTO: 3.94 10*6/MM3 (ref 4.14–5.8)
SODIUM BLD-SCNC: 145 MMOL/L (ref 136–145)
WBC NRBC COR # BLD: 8.1 10*3/MM3 (ref 3.4–10.8)

## 2019-05-03 PROCEDURE — G0378 HOSPITAL OBSERVATION PER HR: HCPCS

## 2019-05-03 PROCEDURE — 70551 MRI BRAIN STEM W/O DYE: CPT

## 2019-05-03 PROCEDURE — 80048 BASIC METABOLIC PNL TOTAL CA: CPT | Performed by: EMERGENCY MEDICINE

## 2019-05-03 PROCEDURE — 82962 GLUCOSE BLOOD TEST: CPT

## 2019-05-03 PROCEDURE — 70450 CT HEAD/BRAIN W/O DYE: CPT

## 2019-05-03 PROCEDURE — 99205 OFFICE O/P NEW HI 60 MIN: CPT | Performed by: PSYCHIATRY & NEUROLOGY

## 2019-05-03 PROCEDURE — 93005 ELECTROCARDIOGRAM TRACING: CPT | Performed by: EMERGENCY MEDICINE

## 2019-05-03 PROCEDURE — 63710000001 INSULIN LISPRO (HUMAN) PER 5 UNITS: Performed by: HOSPITALIST

## 2019-05-03 PROCEDURE — 70544 MR ANGIOGRAPHY HEAD W/O DYE: CPT

## 2019-05-03 PROCEDURE — 85025 COMPLETE CBC W/AUTO DIFF WBC: CPT | Performed by: EMERGENCY MEDICINE

## 2019-05-03 PROCEDURE — 70547 MR ANGIOGRAPHY NECK W/O DYE: CPT

## 2019-05-03 PROCEDURE — 99285 EMERGENCY DEPT VISIT HI MDM: CPT

## 2019-05-03 PROCEDURE — 63710000001 INSULIN GLARGINE PER 5 UNITS: Performed by: HOSPITALIST

## 2019-05-03 PROCEDURE — 93010 ELECTROCARDIOGRAM REPORT: CPT | Performed by: INTERNAL MEDICINE

## 2019-05-03 RX ORDER — PANTOPRAZOLE SODIUM 40 MG/1
40 TABLET, DELAYED RELEASE ORAL EVERY MORNING
Status: DISCONTINUED | OUTPATIENT
Start: 2019-05-04 | End: 2019-05-10 | Stop reason: HOSPADM

## 2019-05-03 RX ORDER — ONDANSETRON 4 MG/1
4 TABLET, FILM COATED ORAL EVERY 6 HOURS PRN
Status: DISCONTINUED | OUTPATIENT
Start: 2019-05-03 | End: 2019-05-10 | Stop reason: HOSPADM

## 2019-05-03 RX ORDER — LEVETIRACETAM 500 MG/1
500 TABLET ORAL 2 TIMES DAILY
COMMUNITY

## 2019-05-03 RX ORDER — ASPIRIN AND DIPYRIDAMOLE 25; 200 MG/1; MG/1
1 CAPSULE, EXTENDED RELEASE ORAL 2 TIMES DAILY
COMMUNITY
End: 2019-05-10 | Stop reason: HOSPADM

## 2019-05-03 RX ORDER — INSULIN GLARGINE 100 [IU]/ML
45 INJECTION, SOLUTION SUBCUTANEOUS NIGHTLY
Status: DISCONTINUED | OUTPATIENT
Start: 2019-05-03 | End: 2019-05-04

## 2019-05-03 RX ORDER — ACETAMINOPHEN 650 MG/1
650 SUPPOSITORY RECTAL EVERY 4 HOURS PRN
Status: DISCONTINUED | OUTPATIENT
Start: 2019-05-03 | End: 2019-05-10 | Stop reason: HOSPADM

## 2019-05-03 RX ORDER — LEVOTHYROXINE SODIUM 0.05 MG/1
50 TABLET ORAL DAILY
Status: DISCONTINUED | OUTPATIENT
Start: 2019-05-04 | End: 2019-05-10 | Stop reason: HOSPADM

## 2019-05-03 RX ORDER — ASPIRIN AND DIPYRIDAMOLE 25; 200 MG/1; MG/1
1 CAPSULE, EXTENDED RELEASE ORAL 2 TIMES DAILY
Status: DISCONTINUED | OUTPATIENT
Start: 2019-05-03 | End: 2019-05-04

## 2019-05-03 RX ORDER — ACETAMINOPHEN 325 MG/1
650 TABLET ORAL EVERY 4 HOURS PRN
Status: DISCONTINUED | OUTPATIENT
Start: 2019-05-03 | End: 2019-05-10 | Stop reason: HOSPADM

## 2019-05-03 RX ORDER — SODIUM CHLORIDE 0.9 % (FLUSH) 0.9 %
10 SYRINGE (ML) INJECTION AS NEEDED
Status: DISCONTINUED | OUTPATIENT
Start: 2019-05-03 | End: 2019-05-10 | Stop reason: HOSPADM

## 2019-05-03 RX ORDER — AMITRIPTYLINE HYDROCHLORIDE 25 MG/1
25 TABLET, FILM COATED ORAL NIGHTLY
Status: DISCONTINUED | OUTPATIENT
Start: 2019-05-03 | End: 2019-05-10 | Stop reason: HOSPADM

## 2019-05-03 RX ORDER — SODIUM PHOSPHATE,MONO-DIBASIC 19G-7G/118
1 ENEMA (ML) RECTAL DAILY
COMMUNITY

## 2019-05-03 RX ORDER — SODIUM CHLORIDE 0.9 % (FLUSH) 0.9 %
3-10 SYRINGE (ML) INJECTION AS NEEDED
Status: DISCONTINUED | OUTPATIENT
Start: 2019-05-03 | End: 2019-05-10 | Stop reason: HOSPADM

## 2019-05-03 RX ORDER — DEXTROSE MONOHYDRATE 25 G/50ML
25 INJECTION, SOLUTION INTRAVENOUS
Status: DISCONTINUED | OUTPATIENT
Start: 2019-05-03 | End: 2019-05-10 | Stop reason: HOSPADM

## 2019-05-03 RX ORDER — MELATONIN
1000 2 TIMES DAILY
COMMUNITY

## 2019-05-03 RX ORDER — MELATONIN
1000 2 TIMES DAILY
Status: DISCONTINUED | OUTPATIENT
Start: 2019-05-03 | End: 2019-05-10 | Stop reason: HOSPADM

## 2019-05-03 RX ORDER — ONDANSETRON 2 MG/ML
4 INJECTION INTRAMUSCULAR; INTRAVENOUS EVERY 6 HOURS PRN
Status: DISCONTINUED | OUTPATIENT
Start: 2019-05-03 | End: 2019-05-10 | Stop reason: HOSPADM

## 2019-05-03 RX ORDER — CARVEDILOL 25 MG/1
25 TABLET ORAL 2 TIMES DAILY WITH MEALS
Status: DISCONTINUED | OUTPATIENT
Start: 2019-05-03 | End: 2019-05-10 | Stop reason: HOSPADM

## 2019-05-03 RX ORDER — INSULIN GLARGINE 100 [IU]/ML
25 INJECTION, SOLUTION SUBCUTANEOUS EVERY MORNING
Status: DISCONTINUED | OUTPATIENT
Start: 2019-05-04 | End: 2019-05-04

## 2019-05-03 RX ORDER — SODIUM CHLORIDE 9 MG/ML
75 INJECTION, SOLUTION INTRAVENOUS CONTINUOUS
Status: DISCONTINUED | OUTPATIENT
Start: 2019-05-03 | End: 2019-05-09

## 2019-05-03 RX ORDER — LEVETIRACETAM 500 MG/1
500 TABLET ORAL EVERY 12 HOURS SCHEDULED
Status: DISCONTINUED | OUTPATIENT
Start: 2019-05-03 | End: 2019-05-10 | Stop reason: HOSPADM

## 2019-05-03 RX ORDER — NICOTINE POLACRILEX 4 MG
15 LOZENGE BUCCAL
Status: DISCONTINUED | OUTPATIENT
Start: 2019-05-03 | End: 2019-05-10 | Stop reason: HOSPADM

## 2019-05-03 RX ORDER — CYCLOBENZAPRINE HCL 10 MG
10 TABLET ORAL NIGHTLY PRN
Status: DISCONTINUED | OUTPATIENT
Start: 2019-05-03 | End: 2019-05-08

## 2019-05-03 RX ORDER — ATORVASTATIN CALCIUM 80 MG/1
80 TABLET, FILM COATED ORAL DAILY
Status: DISCONTINUED | OUTPATIENT
Start: 2019-05-03 | End: 2019-05-10 | Stop reason: HOSPADM

## 2019-05-03 RX ORDER — SODIUM CHLORIDE 0.9 % (FLUSH) 0.9 %
3 SYRINGE (ML) INJECTION EVERY 12 HOURS SCHEDULED
Status: DISCONTINUED | OUTPATIENT
Start: 2019-05-03 | End: 2019-05-10 | Stop reason: HOSPADM

## 2019-05-03 RX ADMIN — AMITRIPTYLINE HYDROCHLORIDE 25 MG: 25 TABLET, FILM COATED ORAL at 21:47

## 2019-05-03 RX ADMIN — INSULIN GLARGINE 45 UNITS: 100 INJECTION, SOLUTION SUBCUTANEOUS at 21:46

## 2019-05-03 RX ADMIN — SODIUM CHLORIDE, PRESERVATIVE FREE 3 ML: 5 INJECTION INTRAVENOUS at 21:48

## 2019-05-03 RX ADMIN — SODIUM CHLORIDE 500 ML: 9 INJECTION, SOLUTION INTRAVENOUS at 13:31

## 2019-05-03 RX ADMIN — CARVEDILOL 25 MG: 25 TABLET, FILM COATED ORAL at 21:47

## 2019-05-03 RX ADMIN — ATORVASTATIN CALCIUM 80 MG: 80 TABLET, FILM COATED ORAL at 21:47

## 2019-05-03 RX ADMIN — VITAMIN D, TAB 1000IU (100/BT) 1000 UNITS: 25 TAB at 21:47

## 2019-05-03 RX ADMIN — INSULIN LISPRO 4 UNITS: 100 INJECTION, SOLUTION INTRAVENOUS; SUBCUTANEOUS at 21:46

## 2019-05-03 RX ADMIN — ASPIRIN AND EXTENDED-RELEASE DIPYRIDAMOLE 1 CAPSULE: 25; 200 CAPSULE ORAL at 21:47

## 2019-05-03 RX ADMIN — SODIUM CHLORIDE 100 ML/HR: 9 INJECTION, SOLUTION INTRAVENOUS at 21:48

## 2019-05-03 RX ADMIN — LEVETIRACETAM 500 MG: 500 TABLET, FILM COATED ORAL at 21:47

## 2019-05-03 NOTE — ED NOTES
"Nursing report ED to floor  Rocky Logan Jr.  58 y.o.  male    HPI (triage note):   Chief Complaint   Patient presents with   • Extremity Weakness       Admitting doctor:   Manohar Li MD    Admitting diagnosis:   The encounter diagnosis was Left leg weakness.    Code status:   Current Code Status     Date Active Code Status Order ID Comments User Context       Prior   Full code       Allergies:   Sulfa antibiotics    Weight:       05/03/19  1145   Weight: 103 kg (227 lb 6.4 oz)       Most recent vitals:   Vitals:    05/03/19 1138 05/03/19 1145 05/03/19 1247 05/03/19 1318   BP:  169/87 160/82 177/90   Pulse: 85 79 69 70   Resp: 16 15     Temp: 97.6 °F (36.4 °C)      TempSrc: Tympanic      SpO2: 98%  97% 98%   Weight:  103 kg (227 lb 6.4 oz)     Height: 167.6 cm (66\")          Active LDAs/IV Access:   Lines, Drains & Airways    Active LDAs     Name:   Placement date:   Placement time:   Site:   Days:    Peripheral IV 05/03/19 1330 Left Hand   05/03/19    1330    Hand   less than 1                Labs (abnormal labs have a star):   Labs Reviewed   CBC WITH AUTO DIFFERENTIAL - Abnormal; Notable for the following components:       Result Value    RBC 3.94 (*)     Hemoglobin 11.7 (*)     Hematocrit 33.8 (*)     Lymphocyte % 13.0 (*)     All other components within normal limits   BASIC METABOLIC PANEL   CBC AND DIFFERENTIAL    Narrative:     The following orders were created for panel order CBC & Differential.  Procedure                               Abnormality         Status                     ---------                               -----------         ------                     CBC Auto Differential[[id number]]        Abnormal            Final result                 Please view results for these tests on the individual orders.       EKG:   ECG 12 Lead   Preliminary Result   HEART RATE= 70  bpm   RR Interval= 860  ms   OH Interval= 192  ms   P Horizontal Axis= 16  deg   P Front Axis= 30  deg   QRSD Interval= 106  ms "   QT Interval= 421  ms   QRS Axis= -79  deg   T Wave Axis= 85  deg   - ABNORMAL ECG -   Sinus rhythm   Inferior infarct, old   Electronically Signed By:    Date and Time of Study: 2019-05-03 12:05:41          Meds given in ED:   Medications   sodium chloride 0.9 % flush 10 mL (not administered)   sodium chloride 0.9 % bolus 500 mL (500 mL Intravenous New Bag 5/3/19 1331)       Imaging results:  Ct Head Without Contrast    Result Date: 5/3/2019  1. Given the difference in modality, no significant change when compared to prior MRI of the head from [facility] 05/17/2017 with no acute abnormality seen on this head CT. 2. 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 There is a large area of encephalomalacia involving the inferior lateral right frontal lobe and the anterior and superior right temporal lobe with the area of encephalomalacia measuring 8 x 5 x 5.8 cm, unchanged.  There is a lentiform shaped area of soft tissue density in the anterior inferior medial aspect of the right middle cranial fossa measuring 14 x 9 mm in size, unchanged since MRI 05/17/2017, it could be scar.  A small focus of residual or recurrent meningioma that is stable since 05/17/2017 cannot be excluded. 3. Mild small vessel disease in the cerebral white matter and there are extensive calcified atherosclerotic plaques in the intracranial segments of the distal vertebral arteries and cavernous and supracavernous segments of the internal carotid arteries bilaterally.  The remainder of the head CT is unremarkable. If there remains any clinical suspicion of an acute stroke causing the patient's left-sided weakness, I recommend an MRI of the brain for more complete assessment.  The results and recommendations were communicated to Dr. Obando from the emergency room by telephone 05/03/2019 at 12:30 PM.  Radiation  dose reduction techniques were utilized, including automated exposure control and exposure modulation based on body size.         Ambulatory status:   - up with assistance    Social issues:   Social History     Socioeconomic History   • Marital status: Single     Spouse name: Not on file   • Number of children: Not on file   • Years of education: Not on file   • Highest education level: Not on file   Tobacco Use   • Smoking status: Never Smoker   • Smokeless tobacco: Never Used   Substance and Sexual Activity   • Alcohol use: Yes     Alcohol/week: 1.8 oz     Types: 3 Glasses of wine per week   • Drug use: No   • Sexual activity: Ellie Neves, RN  05/03/19 7215

## 2019-05-03 NOTE — H&P
Patient Care Team:  Karen Martinez APRN as PCP - General (Family Medicine)    Chief complaint left sided weakness    Subjective     Very pleasant 59yo gentleman with h/o right sided pontine CVA 2 years ago and right craniotomy for resection of benign meningioma 10 years ago, h/o DM2, HTN, HLD, hypoT4, and CKD3 who presented to ER c/o sudden onset around 1130 yesterday of left sided weakness. Mainly in extremities. Pt feels his LUE is back to normal at present, but still reports LLE weakness. No speech changes, new vision changes, HA, chest pain, palp, SOA. No exacerbating factors. Symptoms are mild.         Review of Systems   Constitutional: Negative for appetite change, chills, diaphoresis, fatigue and fever.   HENT: Negative for congestion, ear pain, facial swelling, hearing loss, mouth sores, nosebleeds, rhinorrhea, sore throat, trouble swallowing and voice change.    Eyes: Negative for pain and visual disturbance.   Respiratory: Negative for cough, choking, chest tightness, shortness of breath and stridor.    Cardiovascular: Negative for chest pain, palpitations and leg swelling.   Gastrointestinal: Negative for abdominal pain, blood in stool, diarrhea, nausea and vomiting.   Endocrine: Negative for cold intolerance and heat intolerance.   Genitourinary: Negative for decreased urine volume, difficulty urinating, dysuria and hematuria.   Musculoskeletal: Negative for back pain and neck pain.   Skin: Negative for color change, pallor and rash.   Allergic/Immunologic: Negative for environmental allergies and food allergies.   Neurological: Positive for weakness. Negative for tremors, seizures, syncope, facial asymmetry, speech difficulty, light-headedness, numbness and headaches.   Hematological: Negative for adenopathy. Does not bruise/bleed easily.   Psychiatric/Behavioral: Negative for behavioral problems, confusion and hallucinations.        Past Medical History:   Diagnosis Date   • Diabetes  mellitus (CMS/HCC)    • Hypertension    • Stroke (CMS/HCC)      Past Surgical History:   Procedure Laterality Date   • CARDIAC SURGERY     • HERNIA REPAIR       Family History   Problem Relation Age of Onset   • Heart disease Mother    • Diabetes Father      Social History     Tobacco Use   • Smoking status: Never Smoker   • Smokeless tobacco: Never Used   Substance Use Topics   • Alcohol use: Yes     Alcohol/week: 1.8 oz     Types: 3 Glasses of wine per week   • Drug use: No     Medications Prior to Admission   Medication Sig Dispense Refill Last Dose   • amitriptyline (ELAVIL) 25 MG tablet Take 25 mg by mouth Every Night.   5/2/2019 at Unknown time   • amLODIPine (NORVASC) 10 MG tablet Take 1 tablet by mouth Daily. 30 tablet 1 5/3/2019 at Unknown time   • aspirin-dipyridamole (AGGRENOX)  MG per 12 hr capsule Take 1 capsule by mouth 2 (Two) Times a Day.   5/3/2019 at Unknown time   • atorvastatin (LIPITOR) 80 MG tablet TAKE ONE TABLET BY MOUTH DAILY 90 tablet 10 5/2/2019 at Unknown time   • carbonyl iron (FEOSOL) 45 MG tablet tablet Take 1 tablet by mouth Every Night.   5/2/2019 at Unknown time   • carvedilol (COREG) 25 MG tablet Take 25 mg by mouth 2 (Two) Times a Day With Meals.   5/3/2019 at Unknown time   • cholecalciferol (VITAMIN D3) 1000 units tablet Take 1,000 Units by mouth 2 (Two) Times a Day.   5/3/2019 at Unknown time   • glucose blood (ACCU-CHEK MILENA) test strip To check to check 3 - 4 times 100 each 3 Taking   • glucose blood (ACCU-CHEK COMPACT PLUS) test strip To check 3 - 4 times a day 100 each 2 Taking   • glucose blood (FREESTYLE LITE) test strip USE TO TEST BLOOD SUGAR 4 TIMES DAILY ICD 10 CODE E11.9 200 each 5 Taking   • Insulin Lispro (HUMALOG KWIKPEN) 100 UNIT/ML solution pen-injector Inject 14 Units under the skin into the appropriate area as directed 3 (Three) Times a Day. 14 UNITS WITH BF 16 UNIT WITH LUNCH 18 UNITS WITH DINNER    THEN USES SLIDING SCALE ON TOP OF SCHEDULED DOSING    5/3/2019 at Unknown time   • Lancets (ACCU-CHEK SOFT TOUCH) lancets To check 3 - 4 times a day 100 each 2 Taking   • levETIRAcetam (KEPPRA) 500 MG tablet Take 500 mg by mouth 2 (Two) Times a Day.   5/3/2019 at Unknown time   • levothyroxine (SYNTHROID, LEVOTHROID) 50 MCG tablet Take 1 tablet by mouth Daily. 30 tablet 11 5/3/2019 at Unknown time   • linagliptin (TRADJENTA) 5 MG tablet tablet Take 5 mg by mouth Daily.   5/3/2019 at Unknown time   • lisinopril (PRINIVIL,ZESTRIL) 40 MG tablet Take 1 tablet by mouth Daily. 30 tablet 1 5/3/2019 at Unknown time   • Multiple Vitamins-Minerals (MULTIVITAMIN ADULT PO) Take 1 tablet by mouth Daily.   5/3/2019 at Unknown time   • omega-3 acid ethyl esters (LOVAZA) 1 g capsule TAKE TWO CAPSULES BY MOUTH DAILY 60 capsule 10 5/2/2019 at Unknown time   • omeprazole (priLOSEC) 40 MG capsule Take 40 mg by mouth Daily.   5/3/2019 at Unknown time   • TOUJEO SOLOSTAR 300 UNIT/ML solution pen-injector INJECT 30 UNITS SUBCUTANEOUSLY EVERY MORNING AND 60 UNITS EVERY NIGHT AT BEDTIME 3 pen 2 5/3/2019 at Unknown time   • TURMERIC PO Take 1 tablet by mouth 2 (Two) Times a Day.   Past Week at Unknown time   • cyclobenzaprine (FLEXERIL) 10 MG tablet Take 10 mg by mouth At Night As Needed.   Unknown at Unknown time   • glucosamine-chondroitin 500-400 MG capsule capsule Take 1 capsule by mouth Daily.   More than a month at Unknown time     Allergies:  Sulfa antibiotics    Objective      Vital Signs  Temp:  [97.6 °F (36.4 °C)-97.7 °F (36.5 °C)] 97.7 °F (36.5 °C)  Heart Rate:  [69-85] 74  Resp:  [15-16] 16  BP: (149-177)/(80-90) 149/80    Physical Exam   Constitutional: He is oriented to person, place, and time. He appears well-developed and well-nourished. No distress.   HENT:   Head: Normocephalic.   Mouth/Throat: Oropharynx is clear and moist. No oropharyngeal exudate.   Post-surgical changes right cranium   Eyes: Conjunctivae and EOM are normal. Right eye exhibits no discharge. Left eye  exhibits no discharge. No scleral icterus.   Right pupil slightly larger than left, this is chronic per pt   Neck: Normal range of motion. Neck supple. No thyromegaly present.   Cardiovascular: Normal rate, regular rhythm, normal heart sounds and intact distal pulses.   No murmur heard.  Pulmonary/Chest: Effort normal and breath sounds normal. No respiratory distress.   Abdominal: Soft. Bowel sounds are normal. He exhibits no distension. There is no tenderness.   Musculoskeletal: Normal range of motion. He exhibits no edema or deformity.   Lymphadenopathy:     He has no cervical adenopathy.   Neurological: He is alert and oriented to person, place, and time. He displays normal reflexes. No cranial nerve deficit or sensory deficit. He exhibits normal muscle tone. Coordination normal.   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 Capillary refill takes less than 2 seconds. No rash noted. He is not diaphoretic. No erythema.   Psychiatric: He has a normal mood and affect. His behavior is normal.   Nursing note and vitals reviewed.      Results Review:   I reviewed the patient's new clinical results.  I reviewed the patient's new imaging results and agree with the interpretation.  I reviewed the patient's other test results and agree with the interpretation  I personally viewed and interpreted the patient's EKG/Telemetry data  Discussed with patient, family x 4, RN, and Dr. Obando      Assessment/Plan       Transient ischemic attack (TIA)    Type 2 diabetes mellitus with complication, with long-term current use of insulin (CMS/[facility])    Hyperlipidemia    Vitamin D deficiency    Subclinical hypothyroidism    Left leg weakness    HTN (hypertension)    GERD without esophagitis    History of pontine stroke    CKD (chronic kidney disease) stage 3, GFR 30-59 ml/min (CMS/[facility])    History of meningioma of the brain          Plan:   (Admit  under standard stroke order set  Consult Neuro  Check Echo  Defer further neuro-imaging to Neuro as pt has a Cr of 2.00  Permissive HTN (hold amlodipine and lisinopril, but continue Coreg)  Check HgbA1c and lipid panel  IVFs  PT/OT/ST  Continue Aggrenox and Lipitor 80mg  Continue long-acting insulin at attenuated dose, mealtime insulin ordered with supplemental SSI  Further orders to follow as suggested by evolving hospital course).       I discussed the patients findings and my recommendations with patient, family, nursing staff and primary care team    Manohar Li MD  05/03/19  4:59 PM    Time: 45min

## 2019-05-03 NOTE — ED NOTES
Attempted iv access, unsuccessful.  Paged iv therapy for iv access.     Ellie Marie, RN  05/03/19 9102

## 2019-05-03 NOTE — PLAN OF CARE
Problem: Patient Care Overview  Goal: Plan of Care Review  Outcome: Ongoing (interventions implemented as appropriate)   05/03/19 1953   Coping/Psychosocial   Plan of Care Reviewed With patient;family   Plan of Care Review   Progress no change   OTHER   Outcome Summary VSS. Admitted to unit. Family at bedside. Will CTM.       Problem: Fall Risk (Adult)  Goal: Identify Related Risk Factors and Signs and Symptoms  Outcome: Ongoing (interventions implemented as appropriate)    Goal: Absence of Fall  Outcome: Ongoing (interventions implemented as appropriate)      Problem: Stroke (Ischemic) (Adult)  Goal: Signs and Symptoms of Listed Potential Problems Will be Absent, Minimized or Managed (Stroke)  Outcome: Ongoing (interventions implemented as appropriate)

## 2019-05-03 NOTE — PROGRESS NOTES
Clinical Pharmacy Services: Medication History    Rocky Logan Jr. is a 58 y.o. male presenting to [facility] for Left leg weakness [R29.898]    He  has a past medical history of Diabetes mellitus (CMS/[facility]), Hypertension, and Stroke (CMS/[facility]).    Allergies as of 05/03/2019 - Reviewed 05/03/2019   Allergen Reaction Noted   • Sulfa antibiotics Rash 05/11/2017       Medication information was obtained from: sister  Pharmacy and Phone Number: [facility] [phone]    Prior to Admission Medications     Prescriptions Last Dose Informant Patient Reported? Taking?    amitriptyline (ELAVIL) 25 MG tablet 5/2/2019  Yes Yes    Take 25 mg by mouth Every Night.    amLODIPine (NORVASC) 10 MG tablet 5/3/2019  No Yes    Take 1 tablet by mouth Daily.    aspirin-dipyridamole (AGGRENOX)  MG per 12 hr capsule 5/3/2019  Yes Yes    Take 1 capsule by mouth 2 (Two) Times a Day.    atorvastatin (LIPITOR) 80 MG tablet 5/3/2019  No Yes    TAKE ONE TABLET BY MOUTH DAILY    carbonyl iron (FEOSOL) 45 MG tablet tablet   Yes Yes    Take 1 tablet by mouth Every Night.    carvedilol (COREG) 25 MG tablet 5/3/2019  Yes Yes    Take 25 mg by mouth Daily.    cholecalciferol (VITAMIN D3) 1000 units tablet   Yes Yes    Take 1,000 Units by mouth 2 (Two) Times a Day.    cyclobenzaprine (FLEXERIL) 10 MG tablet   Yes Yes    Take 10 mg by mouth At Night As Needed.    glucosamine-chondroitin 500-400 MG capsule capsule   Yes Yes    Take 1 capsule by mouth Daily.    glucose blood (ACCU-CHEK MILENA) test strip   No Yes    To check to check 3 - 4 times    glucose blood (ACCU-CHEK COMPACT PLUS) test strip   No Yes    To check 3 - 4 times a day    glucose blood (FREESTYLE LITE) test strip   No Yes    USE TO TEST BLOOD SUGAR 4 TIMES DAILY ICD 10 CODE E11.9    Insulin Lispro (HUMALOG KWIKPEN) 100 UNIT/ML solution pen-injector 5/3/2019  Yes Yes    Inject 14 Units under the skin into the appropriate area as directed 3 (Three) Times a Day. 14 UNITS WITH BF  16 UNIT WITH LUNCH 18 UNITS WITH DINNER    THEN USES SLIDING SCALE ON TOP OF SCHEDULED DOSING    Lancets (ACCU-CHEK SOFT TOUCH) lancets   No Yes    To check 3 - 4 times a day    levETIRAcetam (KEPPRA) 500 MG tablet 5/3/2019  Yes Yes    Take 500 mg by mouth 2 (Two) Times a Day.    levothyroxine (SYNTHROID, LEVOTHROID) 50 MCG tablet 5/3/2019  No Yes    Take 1 tablet by mouth Daily.    linagliptin (TRADJENTA) 5 MG tablet tablet 5/3/2019  Yes Yes    Take 5 mg by mouth Daily.    lisinopril (PRINIVIL,ZESTRIL) 40 MG tablet 5/3/2019  No Yes    Take 1 tablet by mouth Daily.    Multiple Vitamins-Minerals (MULTIVITAMIN ADULT PO)   Yes Yes    Take 1 tablet by mouth Daily.    omega-3 acid ethyl esters (LOVAZA) 1 g capsule 5/3/2019  No Yes    TAKE TWO CAPSULES BY MOUTH DAILY    omeprazole (priLOSEC) 40 MG capsule 5/3/2019  Yes Yes    Take 40 mg by mouth Daily.    TOUJEO SOLOSTAR 300 UNIT/ML solution pen-injector 5/3/2019  No Yes    INJECT 30 UNITS SUBCUTANEOUSLY EVERY MORNING AND 60 UNITS EVERY NIGHT AT BEDTIME    TURMERIC PO   Yes Yes    Take 1 tablet by mouth 2 (Two) Times a Day.            Medication notes:   Added patient's dietary supplements: iron, vitamin D, multivitamin, turmeric, and glucosamine/condroitin    This medication list is complete to the best of my knowledge as of 5/3/2019    Please call if questions.    Ana Maria Lucas RPH    5/3/2019 2:07 PM

## 2019-05-03 NOTE — ED PROVIDER NOTES
EMERGENCY DEPARTMENT ENCOUNTER    Room Number:  11/11  Date seen:  5/3/2019  Time seen: 11:45 AM  PCP: Karen Martinez APRN  Historian: Pt      HPI:  Chief Complaint: Left arm and left leg weakness  Context: Rocky Logan Jr. is a 58 y.o. male who presents to the ED c/o L leg and arm weakness starting at 1130 yesterday morning. He states that yesterday he had numbness and weakness in his LUE and weakness in his LLE. He states that his weakness became worse this morning when he was eating breakfast where he was unable to feed himself with his left hand. He states his weakness in his LUE has since improved. Pt denies HYLTON or back pain. Pt reports a prior hx of stroke 2 years ago and meningioma removal. Pt reports he is anticoagulated on aggrenox.     Pain Location: Left leg and left arm  Quality: Weakness and numbness  Intensity/Severity: moderate  Duration: over 24 hours  Onset quality: gradual  Timing: constant  Progression: improved  Aggravating Factors: none  Alleviating Factors: none  Previous Episodes: Pt reports a hx of stroke 2 years ago  Treatment before arrival: Pt is anticoagulated on aggrenox  Associated Symptoms: LUE numbness          PAST MEDICAL HISTORY  Active Ambulatory Problems     Diagnosis Date Noted   • Stroke (CMS/HCC) 05/12/2017   • Type 2 diabetes mellitus with complication, with long-term current use of insulin (CMS/HCC) 05/20/2017   • Hyperlipidemia 05/20/2017   • Elevated TSH 05/20/2017   • Vitamin D deficiency 05/20/2017   • Microalbuminuria 05/21/2017   • Subclinical hypothyroidism 05/21/2017     Resolved Ambulatory Problems     Diagnosis Date Noted   • No Resolved Ambulatory Problems     Past Medical History:   Diagnosis Date   • Diabetes mellitus (CMS/HCC)    • Hypertension    • Stroke (CMS/HCC)          PAST SURGICAL HISTORY  Past Surgical History:   Procedure Laterality Date   • CARDIAC SURGERY     • HERNIA REPAIR           FAMILY HISTORY  Family History   Problem Relation Age of  Onset   • Heart disease Mother    • Diabetes Father          SOCIAL HISTORY  Social History     Socioeconomic History   • Marital status: Single     Spouse name: Not on file   • Number of children: Not on file   • Years of education: Not on file   • Highest education level: Not on file   Tobacco Use   • Smoking status: Never Smoker   • Smokeless tobacco: Never Used   Substance and Sexual Activity   • Alcohol use: Yes     Alcohol/week: 1.8 oz     Types: 3 Glasses of wine per week   • Drug use: No   • Sexual activity: Defer         ALLERGIES  Sulfa antibiotics        REVIEW OF SYSTEMS  Review of Systems   Constitutional: Negative for diaphoresis and fever.   HENT: Negative for congestion.    Eyes: Negative for visual disturbance.   Respiratory: Negative for shortness of breath.    Cardiovascular: Negative for palpitations.   Gastrointestinal: Negative for blood in stool and vomiting.   Endocrine: Negative for polyuria.   Genitourinary: Negative for flank pain.   Musculoskeletal: Negative for joint swelling.   Skin: Negative for wound.   Neurological: Positive for weakness (LUE, LLE) and numbness (LUE). Negative for seizures.   Hematological: Negative for adenopathy.   Psychiatric/Behavioral: Negative for sleep disturbance.            PHYSICAL EXAM  ED Triage Vitals [05/03/19 1138]   Temp Heart Rate Resp BP SpO2   97.6 °F (36.4 °C) 85 16 -- 98 %      Temp src Heart Rate Source Patient Position BP Location FiO2 (%)   Tympanic Monitor -- -- --         GENERAL: no acute distress  HENT: nares patent  EYES: no scleral icterus  CV: regular rhythm, normal rate  RESPIRATORY: normal effort, CTAB  ABDOMEN: soft  MUSCULOSKELETAL: no deformity  NEURO: alert, follows commands  Normal strength in BUE  4/5 strength in LLE with drift present, 5/5 strength in RLE  SILT in BUE and BLE  No appreciable facial droop  Speech is fluent and clear  SKIN: warm, dry        Vital signs and nursing notes reviewed.          LAB RESULTS  No results  found for this or any previous visit (from the past 24 hour(s)).    Ordered the above labs and reviewed the results.        RADIOLOGY  Ct Head Without Contrast    Result Date: 5/3/2019  EMERGENCY NONCONTRAST HEAD CT 05/03/2019  CLINICAL HISTORY: Left-sided weakness, prior pontine stroke, meningioma resection 10 years ago.  TECHNIQUE: Spiral CT images were obtained from the base of the skull to the vertex without intravenous contrast. Images were reformatted and submitted in 3 mm thick axial CT section with brain algorithm and 2 mm thick sagittal and coronal reconstructions were performed.  This is correlated with a prior MRI of brain from [facility] 05/17/2017.  FINDINGS: Patient has had a large prior 10 x 8 cm lateral right frontotemporal parietal-pterional craniotomy for resection of a right middle cranial fossa meningioma 10 years ago. There is a moderate to large area of encephalomalacia involving the inferior lateral right frontal lobe, anterior and superior right temporal lobe.  The area of encephalomalacia measuring 8 x 5 x 5.8 cm is unchanged. There is a lentiform dural based density in the anterior inferior aspect of the right middle cranial fossa measuring 14 x 9 mm medial lateral and anterior posterior dimension, unchanged since prior MRI 05/17/2017; it is nonspecific.  It could be an area of scar or residual tumor. The previously identified 10 x 5 mm right posterior pontine infarct back on 05/17/2017 is unappreciable on the current head CT. There is some mild low density in the periventricular white matter consistent with mild small vessel disease.  The ventricles are normal in size.  I see no midline shift.  No extra-axial fluid collections are identified and there is no evidence of acute intracranial hemorrhage.  There are coarse calcified atherosclerotic plaques involving the intracranial segments of the distal vertebral artery, and extensive calcified plaques involving the cavernous  and supracavernous segments of the internal carotid arteries bilaterally. There is minimal posterior left maxillary sinus mucosal thickening.  The remainder of the paranasal sinuses, mastoid air cells and middle ear cavities are clear.      1. Given the difference in modality, no significant change when compared to prior MRI of the head from [facility] 05/17/2017 with no acute abnormality seen on this head CT. 2. 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 There is a large area of encephalomalacia involving the inferior lateral right frontal lobe and the anterior and superior right temporal lobe with the area of encephalomalacia measuring 8 x 5 x 5.8 cm, unchanged.  There is a lentiform shaped area of soft tissue density in the anterior inferior medial aspect of the right middle cranial fossa measuring 14 x 9 mm in size, unchanged since MRI 05/17/2017, it could be scar.  A small focus of residual or recurrent meningioma that is stable since 05/17/2017 cannot be excluded. 3. Mild small vessel disease in the cerebral white matter and there are extensive calcified atherosclerotic plaques in the intracranial segments of the distal vertebral arteries and cavernous and supracavernous segments of the internal carotid arteries bilaterally.  The remainder of the head CT is unremarkable. If there remains any clinical suspicion of an acute stroke causing the patient's left-sided weakness, I recommend an MRI of the brain for more complete assessment.  The results and recommendations were communicated to Dr. Obando from the emergency room by telephone 05/03/2019 at 12:30 PM.  Radiation dose reduction techniques were utilized, including automated exposure control and exposure modulation based on body size.         Ordered the above noted radiological studies. Reviewed by me in PACS.  Spoke  with Dr. Hutchison (radiologist) regarding CT head scan results.          PROCEDURES  Procedures        EKG:           EKG time: 1205  Rhythm/Rate: nsr, 70  P waves and MI: normal  QRS, axis: LAD   ST and T waves: T wave inversion in aVL    Interpreted Contemporaneously by me, independently viewed  No priors for comparison             MEDICATIONS GIVEN IN ER  Medications   sodium chloride 0.9 % flush 10 mL (not administered)   sodium chloride 0.9 % bolus 500 mL (500 mL Intravenous New Bag 5/3/19 1331)         PROGRESS AND CONSULTS       1158 - Lab work, EKG, and CT head ordered for further evaluation.     1201 - Pt's NIH Stroke Scale is 2 for left leg weakness. Pt is not a tPA candidate due to delayed presentation with last known well greater than 24 hours ago.     1226 - Call placed with stroke neurology.     1246 - Discussed pt care with Dr. Kendall (Stroke Neurology) who agrees with the plan to admit the pt and acquire an MRI.     1302 - Call placed with [facility].     1329 - IVF ordered.     1339 - Discussed pt care with Dr. Li ([facility]) who agrees to admit the pt. Pt was a very difficult stick, and lab work is pending at the time of admission.     1342 - Rechecked pt. Pt is resting comfortably in NAD. Informed pt of the results of his CT head which is unchanged compared to his prior study. Stated the plan to admit the pt for further work up including MRI. Pt and sister understand and agree with plan. All questions answered.     MEDICAL DECISION MAKING    Concern for possible stroke causing LLE weakness.  CT brain neg acute today.  Labs pending as patient was difficult venipuncture.  Neuro consulted, will obtain MRI brain to further evaluate.     MDM  Number of Diagnoses or Management Options  Left leg weakness:      Amount and/or Complexity of Data Reviewed  Clinical lab tests: ordered  Tests in the radiology section of CPT®: ordered and reviewed (CT head - negative acute)  Tests in the medicine section of CPT®: ordered  and reviewed (See EKG procedure note.)  Discussion of test results with the performing providers: yes (Dr. Hutchison)  Decide to obtain previous medical records or to obtain history from someone other than the patient: yes  Discuss the patient with other providers: yes (Dr. Kendall (Stroke Neurology)  Dr. Li ([facility]))         DIAGNOSIS  Final diagnoses:   Left leg weakness       DISPOSITION  ADMISSION    Discussed treatment plan and reason for admission with pt/family and admitting physician.  Pt/family voiced understanding of the plan for admission for further testing/treatment as needed.     Latest Documented Vital Signs:  As of 1:47 PM  BP- 169/87 HR- 79 Temp- 97.6 °F (36.4 °C) (Tympanic) O2 sat- 98%        --  Documentation assistance provided by lillian Swan for Dr. FUNMILAYO Obando MD.  Information recorded by the scribe was done at my direction and has been verified and validated by me.       Cooper Swan  05/03/19 1839       Oseas Obando MD  05/03/19 5831

## 2019-05-03 NOTE — ED TRIAGE NOTES
Pt reports yesterday at 11:30am started with left sided weakness. Pt was ambulatory to triage but looked like he was dragging his left leg. Pt reports his left arm feels heavy.

## 2019-05-04 ENCOUNTER — APPOINTMENT (OUTPATIENT)
Dept: CARDIOLOGY | Facility: HOSPITAL | Age: 59
End: 2019-05-04

## 2019-05-04 PROBLEM — I63.9 ACUTE ISCHEMIC STROKE (HCC): Status: ACTIVE | Noted: 2019-05-03

## 2019-05-04 LAB
ALBUMIN SERPL-MCNC: 3.2 G/DL (ref 3.5–5.2)
ALBUMIN/GLOB SERPL: 1.5 G/DL
ALP SERPL-CCNC: 94 U/L (ref 39–117)
ALT SERPL W P-5'-P-CCNC: 16 U/L (ref 1–41)
ANION GAP SERPL CALCULATED.3IONS-SCNC: 9.9 MMOL/L
AST SERPL-CCNC: 17 U/L (ref 1–40)
BASOPHILS # BLD AUTO: 0.05 10*3/MM3 (ref 0–0.2)
BASOPHILS NFR BLD AUTO: 0.7 % (ref 0–1.5)
BH CV ECHO MEAS - ACS: 2.4 CM
BH CV ECHO MEAS - AO MAX PG (FULL): 2.7 MMHG
BH CV ECHO MEAS - AO MAX PG: 9.6 MMHG
BH CV ECHO MEAS - AO MEAN PG (FULL): 1 MMHG
BH CV ECHO MEAS - AO MEAN PG: 5 MMHG
BH CV ECHO MEAS - AO ROOT AREA (BSA CORRECTED): 1.4
BH CV ECHO MEAS - AO ROOT AREA: 7.1 CM^2
BH CV ECHO MEAS - AO ROOT DIAM: 3 CM
BH CV ECHO MEAS - AO V2 MAX: 155 CM/SEC
BH CV ECHO MEAS - AO V2 MEAN: 102 CM/SEC
BH CV ECHO MEAS - AO V2 VTI: 33.2 CM
BH CV ECHO MEAS - ASC AORTA: 3.3 CM
BH CV ECHO MEAS - AVA(I,A): 2.2 CM^2
BH CV ECHO MEAS - AVA(I,D): 2.2 CM^2
BH CV ECHO MEAS - AVA(V,A): 2.2 CM^2
BH CV ECHO MEAS - AVA(V,D): 2.2 CM^2
BH CV ECHO MEAS - BSA(HAYCOCK): 2.2 M^2
BH CV ECHO MEAS - BSA: 2.1 M^2
BH CV ECHO MEAS - BZI_BMI: 35.8 KILOGRAMS/M^2
BH CV ECHO MEAS - BZI_METRIC_HEIGHT: 167.6 CM
BH CV ECHO MEAS - BZI_METRIC_WEIGHT: 100.7 KG
BH CV ECHO MEAS - EDV(CUBED): 74.1 ML
BH CV ECHO MEAS - EDV(MOD-SP2): 74 ML
BH CV ECHO MEAS - EDV(MOD-SP4): 154 ML
BH CV ECHO MEAS - EDV(TEICH): 78.6 ML
BH CV ECHO MEAS - EF(CUBED): 70.4 %
BH CV ECHO MEAS - EF(MOD-BP): 64 %
BH CV ECHO MEAS - EF(MOD-SP2): 60.8 %
BH CV ECHO MEAS - EF(MOD-SP4): 64.3 %
BH CV ECHO MEAS - EF(TEICH): 62.4 %
BH CV ECHO MEAS - ESV(CUBED): 22 ML
BH CV ECHO MEAS - ESV(MOD-SP2): 29 ML
BH CV ECHO MEAS - ESV(MOD-SP4): 55 ML
BH CV ECHO MEAS - ESV(TEICH): 29.6 ML
BH CV ECHO MEAS - FS: 33.3 %
BH CV ECHO MEAS - IVS/LVPW: 1.2
BH CV ECHO MEAS - IVSD: 1.8 CM
BH CV ECHO MEAS - LAT PEAK E' VEL: 6 CM/SEC
BH CV ECHO MEAS - LV DIASTOLIC VOL/BSA (35-75): 73.7 ML/M^2
BH CV ECHO MEAS - LV MASS(C)D: 283 GRAMS
BH CV ECHO MEAS - LV MASS(C)DI: 135.3 GRAMS/M^2
BH CV ECHO MEAS - LV MAX PG: 6.9 MMHG
BH CV ECHO MEAS - LV MEAN PG: 4 MMHG
BH CV ECHO MEAS - LV SYSTOLIC VOL/BSA (12-30): 26.3 ML/M^2
BH CV ECHO MEAS - LV V1 MAX: 131 CM/SEC
BH CV ECHO MEAS - LV V1 MEAN: 87.9 CM/SEC
BH CV ECHO MEAS - LV V1 VTI: 29 CM
BH CV ECHO MEAS - LVIDD: 4.2 CM
BH CV ECHO MEAS - LVIDS: 2.8 CM
BH CV ECHO MEAS - LVLD AP2: 7.9 CM
BH CV ECHO MEAS - LVLD AP4: 8.5 CM
BH CV ECHO MEAS - LVLS AP2: 7.4 CM
BH CV ECHO MEAS - LVLS AP4: 7.7 CM
BH CV ECHO MEAS - LVOT AREA (M): 2.5 CM^2
BH CV ECHO MEAS - LVOT AREA: 2.5 CM^2
BH CV ECHO MEAS - LVOT DIAM: 1.8 CM
BH CV ECHO MEAS - LVPWD: 1.5 CM
BH CV ECHO MEAS - MED PEAK E' VEL: 5 CM/SEC
BH CV ECHO MEAS - MV A DUR: 0.13 SEC
BH CV ECHO MEAS - MV A MAX VEL: 80.4 CM/SEC
BH CV ECHO MEAS - MV DEC SLOPE: 392 CM/SEC^2
BH CV ECHO MEAS - MV DEC TIME: 239 SEC
BH CV ECHO MEAS - MV E MAX VEL: 73.8 CM/SEC
BH CV ECHO MEAS - MV E/A: 0.92
BH CV ECHO MEAS - MV MAX PG: 3.9 MMHG
BH CV ECHO MEAS - MV MEAN PG: 2 MMHG
BH CV ECHO MEAS - MV P1/2T MAX VEL: 95.3 CM/SEC
BH CV ECHO MEAS - MV P1/2T: 71.2 MSEC
BH CV ECHO MEAS - MV V2 MAX: 99.2 CM/SEC
BH CV ECHO MEAS - MV V2 MEAN: 58.4 CM/SEC
BH CV ECHO MEAS - MV V2 VTI: 29.2 CM
BH CV ECHO MEAS - MVA P1/2T LCG: 2.3 CM^2
BH CV ECHO MEAS - MVA(P1/2T): 3.1 CM^2
BH CV ECHO MEAS - MVA(VTI): 2.5 CM^2
BH CV ECHO MEAS - PA ACC TIME: 0.12 SEC
BH CV ECHO MEAS - PA MAX PG (FULL): 1.3 MMHG
BH CV ECHO MEAS - PA MAX PG: 4.3 MMHG
BH CV ECHO MEAS - PA PR(ACCEL): 23.2 MMHG
BH CV ECHO MEAS - PA V2 MAX: 104 CM/SEC
BH CV ECHO MEAS - PULM A REVS DUR: 0.13 SEC
BH CV ECHO MEAS - PULM A REVS VEL: 20.8 CM/SEC
BH CV ECHO MEAS - PULM DIAS VEL: 43.4 CM/SEC
BH CV ECHO MEAS - PULM S/D: 1
BH CV ECHO MEAS - PULM SYS VEL: 44.3 CM/SEC
BH CV ECHO MEAS - PVA(V,A): 5.9 CM^2
BH CV ECHO MEAS - PVA(V,D): 5.9 CM^2
BH CV ECHO MEAS - QP/QS: 1.6
BH CV ECHO MEAS - RAP SYSTOLE: 3 MMHG
BH CV ECHO MEAS - RV MAX PG: 3.1 MMHG
BH CV ECHO MEAS - RV MEAN PG: 1 MMHG
BH CV ECHO MEAS - RV V1 MAX: 87.5 CM/SEC
BH CV ECHO MEAS - RV V1 MEAN: 48.5 CM/SEC
BH CV ECHO MEAS - RV V1 VTI: 16.6 CM
BH CV ECHO MEAS - RVOT AREA: 7.1 CM^2
BH CV ECHO MEAS - RVOT DIAM: 3 CM
BH CV ECHO MEAS - SI(AO): 112.2 ML/M^2
BH CV ECHO MEAS - SI(CUBED): 24.9 ML/M^2
BH CV ECHO MEAS - SI(LVOT): 35.3 ML/M^2
BH CV ECHO MEAS - SI(MOD-SP2): 21.5 ML/M^2
BH CV ECHO MEAS - SI(MOD-SP4): 47.3 ML/M^2
BH CV ECHO MEAS - SI(TEICH): 23.4 ML/M^2
BH CV ECHO MEAS - SV(AO): 234.7 ML
BH CV ECHO MEAS - SV(CUBED): 52.1 ML
BH CV ECHO MEAS - SV(LVOT): 73.8 ML
BH CV ECHO MEAS - SV(MOD-SP2): 45 ML
BH CV ECHO MEAS - SV(MOD-SP4): 99 ML
BH CV ECHO MEAS - SV(RVOT): 117.3 ML
BH CV ECHO MEAS - SV(TEICH): 49 ML
BH CV ECHO MEAS - TAPSE (>1.6): 1.1 CM2
BH CV ECHO MEASUREMENTS AVERAGE E/E' RATIO: 13.42
BH CV VAS BP RIGHT ARM: NORMAL MMHG
BH CV XLRA - RV BASE: 3.4 CM
BH CV XLRA - TDI S': 6 CM/SEC
BILIRUB SERPL-MCNC: 0.2 MG/DL (ref 0.2–1.2)
BUN BLD-MCNC: 23 MG/DL (ref 6–20)
BUN/CREAT SERPL: 11.2 (ref 7–25)
CALCIUM SPEC-SCNC: 8.3 MG/DL (ref 8.6–10.5)
CHLORIDE SERPL-SCNC: 112 MMOL/L (ref 98–107)
CHOLEST SERPL-MCNC: 175 MG/DL (ref 0–200)
CO2 SERPL-SCNC: 22.1 MMOL/L (ref 22–29)
CREAT BLD-MCNC: 2.05 MG/DL (ref 0.76–1.27)
DEPRECATED RDW RBC AUTO: 41.5 FL (ref 37–54)
EOSINOPHIL # BLD AUTO: 0.28 10*3/MM3 (ref 0–0.4)
EOSINOPHIL NFR BLD AUTO: 3.8 % (ref 0.3–6.2)
ERYTHROCYTE [DISTWIDTH] IN BLOOD BY AUTOMATED COUNT: 13.4 % (ref 12.3–15.4)
GFR SERPL CREATININE-BSD FRML MDRD: 33 ML/MIN/1.73
GLOBULIN UR ELPH-MCNC: 2.1 GM/DL
GLUCOSE BLD-MCNC: 86 MG/DL (ref 65–99)
GLUCOSE BLDC GLUCOMTR-MCNC: 109 MG/DL (ref 70–130)
GLUCOSE BLDC GLUCOMTR-MCNC: 165 MG/DL (ref 70–130)
GLUCOSE BLDC GLUCOMTR-MCNC: 76 MG/DL (ref 70–130)
GLUCOSE BLDC GLUCOMTR-MCNC: 81 MG/DL (ref 70–130)
HBA1C MFR BLD: 7.1 % (ref 4.8–5.6)
HCT VFR BLD AUTO: 29.8 % (ref 37.5–51)
HDLC SERPL-MCNC: 25 MG/DL (ref 40–60)
HGB BLD-MCNC: 10.2 G/DL (ref 13–17.7)
IMM GRANULOCYTES # BLD AUTO: 0.03 10*3/MM3 (ref 0–0.05)
IMM GRANULOCYTES NFR BLD AUTO: 0.4 % (ref 0–0.5)
LDLC SERPL CALC-MCNC: 108 MG/DL (ref 0–100)
LDLC/HDLC SERPL: 4.31 {RATIO}
LEFT ATRIUM VOLUME INDEX: 25 ML/M2
LYMPHOCYTES # BLD AUTO: 1.14 10*3/MM3 (ref 0.7–3.1)
LYMPHOCYTES NFR BLD AUTO: 15.5 % (ref 19.6–45.3)
MAXIMAL PREDICTED HEART RATE: 161 BPM
MCH RBC QN AUTO: 29.5 PG (ref 26.6–33)
MCHC RBC AUTO-ENTMCNC: 34.2 G/DL (ref 31.5–35.7)
MCV RBC AUTO: 86.1 FL (ref 79–97)
MONOCYTES # BLD AUTO: 0.75 10*3/MM3 (ref 0.1–0.9)
MONOCYTES NFR BLD AUTO: 10.2 % (ref 5–12)
NEUTROPHILS # BLD AUTO: 5.12 10*3/MM3 (ref 1.7–7)
NEUTROPHILS NFR BLD AUTO: 69.4 % (ref 42.7–76)
NRBC BLD AUTO-RTO: 0 /100 WBC (ref 0–0.2)
PLATELET # BLD AUTO: 156 10*3/MM3 (ref 140–450)
PMV BLD AUTO: 10.8 FL (ref 6–12)
POTASSIUM BLD-SCNC: 3.7 MMOL/L (ref 3.5–5.2)
PROT SERPL-MCNC: 5.3 G/DL (ref 6–8.5)
RBC # BLD AUTO: 3.46 10*6/MM3 (ref 4.14–5.8)
SODIUM BLD-SCNC: 144 MMOL/L (ref 136–145)
STRESS TARGET HR: 137 BPM
TRIGL SERPL-MCNC: 211 MG/DL (ref 0–150)
VLDLC SERPL-MCNC: 42.2 MG/DL (ref 5–40)
WBC NRBC COR # BLD: 7.37 10*3/MM3 (ref 3.4–10.8)

## 2019-05-04 PROCEDURE — 80061 LIPID PANEL: CPT | Performed by: HOSPITALIST

## 2019-05-04 PROCEDURE — 83036 HEMOGLOBIN GLYCOSYLATED A1C: CPT | Performed by: HOSPITALIST

## 2019-05-04 PROCEDURE — 80053 COMPREHEN METABOLIC PANEL: CPT | Performed by: HOSPITALIST

## 2019-05-04 PROCEDURE — 25010000002 PERFLUTREN (DEFINITY) 8.476 MG IN SODIUM CHLORIDE 0.9 % 10 ML INJECTION: Performed by: HOSPITALIST

## 2019-05-04 PROCEDURE — 85025 COMPLETE CBC W/AUTO DIFF WBC: CPT | Performed by: HOSPITALIST

## 2019-05-04 PROCEDURE — 63710000001 INSULIN LISPRO (HUMAN) PER 5 UNITS: Performed by: INTERNAL MEDICINE

## 2019-05-04 PROCEDURE — 82962 GLUCOSE BLOOD TEST: CPT

## 2019-05-04 PROCEDURE — 93306 TTE W/DOPPLER COMPLETE: CPT | Performed by: INTERNAL MEDICINE

## 2019-05-04 PROCEDURE — 63710000001 INSULIN GLARGINE PER 5 UNITS: Performed by: INTERNAL MEDICINE

## 2019-05-04 PROCEDURE — 63710000001 INSULIN LISPRO (HUMAN) PER 5 UNITS: Performed by: HOSPITALIST

## 2019-05-04 PROCEDURE — 92610 EVALUATE SWALLOWING FUNCTION: CPT

## 2019-05-04 PROCEDURE — 93306 TTE W/DOPPLER COMPLETE: CPT

## 2019-05-04 PROCEDURE — 63710000001 INSULIN GLARGINE PER 5 UNITS: Performed by: HOSPITALIST

## 2019-05-04 RX ORDER — INSULIN GLARGINE 100 [IU]/ML
38 INJECTION, SOLUTION SUBCUTANEOUS NIGHTLY
Status: DISCONTINUED | OUTPATIENT
Start: 2019-05-04 | End: 2019-05-05

## 2019-05-04 RX ORDER — INSULIN GLARGINE 100 [IU]/ML
20 INJECTION, SOLUTION SUBCUTANEOUS EVERY MORNING
Status: DISCONTINUED | OUTPATIENT
Start: 2019-05-05 | End: 2019-05-05

## 2019-05-04 RX ORDER — ASPIRIN 81 MG/1
81 TABLET, CHEWABLE ORAL DAILY
Status: DISCONTINUED | OUTPATIENT
Start: 2019-05-04 | End: 2019-05-10 | Stop reason: HOSPADM

## 2019-05-04 RX ADMIN — INSULIN LISPRO 10 UNITS: 100 INJECTION, SOLUTION INTRAVENOUS; SUBCUTANEOUS at 18:55

## 2019-05-04 RX ADMIN — AMITRIPTYLINE HYDROCHLORIDE 25 MG: 25 TABLET, FILM COATED ORAL at 20:16

## 2019-05-04 RX ADMIN — CARVEDILOL 25 MG: 25 TABLET, FILM COATED ORAL at 09:25

## 2019-05-04 RX ADMIN — LINAGLIPTIN 5 MG: 5 TABLET, FILM COATED ORAL at 09:25

## 2019-05-04 RX ADMIN — PANTOPRAZOLE SODIUM 40 MG: 40 TABLET, DELAYED RELEASE ORAL at 09:26

## 2019-05-04 RX ADMIN — TICAGRELOR 60 MG: 60 TABLET ORAL at 20:16

## 2019-05-04 RX ADMIN — ATORVASTATIN CALCIUM 80 MG: 80 TABLET, FILM COATED ORAL at 09:25

## 2019-05-04 RX ADMIN — INSULIN LISPRO 3 UNITS: 100 INJECTION, SOLUTION INTRAVENOUS; SUBCUTANEOUS at 21:32

## 2019-05-04 RX ADMIN — TICAGRELOR 60 MG: 60 TABLET ORAL at 14:26

## 2019-05-04 RX ADMIN — LEVOTHYROXINE SODIUM 50 MCG: 50 TABLET ORAL at 09:26

## 2019-05-04 RX ADMIN — INSULIN GLARGINE 38 UNITS: 100 INJECTION, SOLUTION SUBCUTANEOUS at 21:31

## 2019-05-04 RX ADMIN — SODIUM CHLORIDE, PRESERVATIVE FREE 3 ML: 5 INJECTION INTRAVENOUS at 09:28

## 2019-05-04 RX ADMIN — INSULIN LISPRO 14 UNITS: 100 INJECTION, SOLUTION INTRAVENOUS; SUBCUTANEOUS at 09:26

## 2019-05-04 RX ADMIN — VITAMIN D, TAB 1000IU (100/BT) 1000 UNITS: 25 TAB at 09:26

## 2019-05-04 RX ADMIN — VITAMIN D, TAB 1000IU (100/BT) 1000 UNITS: 25 TAB at 20:17

## 2019-05-04 RX ADMIN — SODIUM CHLORIDE 50 ML/HR: 9 INJECTION, SOLUTION INTRAVENOUS at 14:30

## 2019-05-04 RX ADMIN — LEVETIRACETAM 500 MG: 500 TABLET, FILM COATED ORAL at 09:26

## 2019-05-04 RX ADMIN — ASPIRIN 81 MG: 81 TABLET, CHEWABLE ORAL at 14:26

## 2019-05-04 RX ADMIN — LEVETIRACETAM 500 MG: 500 TABLET, FILM COATED ORAL at 20:16

## 2019-05-04 RX ADMIN — INSULIN LISPRO 10 UNITS: 100 INJECTION, SOLUTION INTRAVENOUS; SUBCUTANEOUS at 12:12

## 2019-05-04 RX ADMIN — CARVEDILOL 25 MG: 25 TABLET, FILM COATED ORAL at 18:54

## 2019-05-04 RX ADMIN — PERFLUTREN 3 ML: 6.52 INJECTION, SUSPENSION INTRAVENOUS at 08:35

## 2019-05-04 RX ADMIN — INSULIN GLARGINE 25 UNITS: 100 INJECTION, SOLUTION SUBCUTANEOUS at 09:26

## 2019-05-04 RX ADMIN — ASPIRIN AND EXTENDED-RELEASE DIPYRIDAMOLE 1 CAPSULE: 25; 200 CAPSULE ORAL at 09:26

## 2019-05-04 NOTE — SIGNIFICANT NOTE
05/04/19 1020   Rehab Time/Intention   Evaluation Not Performed patient/family declined evaluation  (Patient reports he is falling asleep and tired and can not participate with PT at this time. Will check back tomorrow for PT evaluation.)   Rehab Treatment   Discipline physical therapist   Recommendation   PT - Next Appointment 05/05/19

## 2019-05-04 NOTE — PLAN OF CARE
Problem: Patient Care Overview  Goal: Plan of Care Review   05/04/19 3641   Coping/Psychosocial   Plan of Care Reviewed With patient;father;mother;sibling   OTHER   Outcome Summary Pt presents with a functional swallow. Recommend a regular diet with thin liquids. VFSS recommended as pt states he coughed with breakfast and his sister stated he coughed during lunch. VFSS recommended.

## 2019-05-04 NOTE — THERAPY EVALUATION
Acute Care - Speech Language Pathology   Swallow Initial Evaluation [facility]     Patient Name: Rocky Logan Jr.  : 1960  MRN: [id number]  Today's Date: 2019               Admit Date: 5/3/2019    Visit Dx:     ICD-10-CM ICD-9-CM   1. Left leg weakness R29.898 729.89   2. Right pontine stroke (CMS/HCC) I63.50 434.91   3. Oropharyngeal dysphagia R13.12 787.22     Patient Active Problem List   Diagnosis   • Stroke (CMS/HCC)   • Type 2 diabetes mellitus with complication, with long-term current use of insulin (CMS/HCC)   • Hyperlipidemia   • Elevated TSH   • Vitamin D deficiency   • Microalbuminuria   • Subclinical hypothyroidism   • Left leg weakness   • HTN (hypertension)   • GERD without esophagitis   • History of pontine stroke   • CKD (chronic kidney disease) stage 3, GFR 30-59 ml/min (CMS/HCC)   • History of meningioma of the brain   • Acute ischemic stroke (CMS/HCC)     Past Medical History:   Diagnosis Date   • Diabetes mellitus (CMS/HCC)    • Hypertension    • Stroke (CMS/HCC)      Past Surgical History:   Procedure Laterality Date   • CARDIAC SURGERY     • HERNIA REPAIR          SWALLOW EVALUATION (last 72 hours)      SLP Adult Swallow Evaluation     Row Name 19 1300                   Rehab Evaluation    Document Type  evaluation  -LS        Patient Observations  alert;cooperative;agree to therapy  -LS        Patient Effort  good  -LS           General Information    Patient Profile Reviewed  yes  -LS        Pertinent History Of Current Problem  Pt is a 58 y.o. m. who presented to ED c/o L leg and arm  weakness 19 which started at 11;30 . Pt states symptoms became worse the morning of 5/3/19 while eating breakfast. He states he was unable to feed himself. Pt reports prior hx of stroke 2 years ago.  -LS        Current Method of Nutrition  regular textures;thin liquids  -LS        Precautions/Limitations, Vision  WFL  -LS        Precautions/Limitations, Hearing  WFL  -LS        Prior  Level of Function-Communication  WFL  -LS        Prior Level of Function-Swallowing  no diet consistency restrictions  -        Plans/Goals Discussed with  patient;family;agreed upon  -        Barriers to Rehab  none identified  -        Patient's Goals for Discharge  patient did not state  -LS           Oral Motor and Function    Dentition Assessment  natural, present and adequate  -LS        Secretion Management  WNL/WFL  -LS        Mucosal Quality  moist, healthy  -LS           General Eating/Swallowing Observations    Respiratory Support Currently in Use  room air  -LS        Eating/Swallowing Skills  fed by SLP  -LS        Positioning During Eating  upright 90 degree;upright in bed  -LS        Utensils Used  spoon;cup  -LS        Consistencies Trialed  regular textures;thin liquids  -LS           Clinical Swallow Eval    Oral Prep Phase  WFL  -LS        Oral Transit  WFL  -LS        Oral Residue  WFL  -LS        Pharyngeal Phase  no overt signs/symptoms of pharyngeal impairment  -LS           Recommendations    Therapy Frequency (Swallow)  PRN  -LS        Predicted Duration Therapy Intervention (Days)  until discharge  -LS        SLP Diet Recommendation  regular textures;thin liquids  -LS        Recommended Diagnostics  VFSS (MBS) Pt states cough w/eggs and sister noted cough with lunch.  -LS        Recommended Precautions and Strategies  upright posture during/after eating;small bites of food and sips of liquid  -LS        SLP Rec. for Method of Medication Administration  meds whole;with thin liquids;with pudding or applesauce  -        Monitor for Signs of Aspiration  yes;notify SLP if any concerns  -           Swallow Goals (SLP)    Oral Nutrition/Hydration Goal Selection (SLP)  oral nutrition/hydration, SLP goal 1  -          User Key  (r) = Recorded By, (t) = Taken By, (c) = Cosigned By    Initials Name Effective Dates    Deanna Troy MS [facility]-SLP 06/08/18 -           EDUCATION  The  patient has been educated in the following areas:   Dysphagia (Swallowing Impairment).    SLP Recommendation and Plan     SLP Diet Recommendation: regular textures, thin liquids  Recommended Precautions and Strategies: upright posture during/after eating, small bites of food and sips of liquid     Monitor for Signs of Aspiration: yes, notify SLP if any concerns  Recommended Diagnostics: [place] states cough w/eggs and sister noted cough with lunch.)           Therapy Frequency (Swallow): PRN  Predicted Duration Therapy Intervention (Days): until discharge       Plan of Care Reviewed With: patient, father, mother, sibling  Plan of Care Review  Plan of Care Reviewed With: patient, father, mother, sibling  Outcome Summary: Pt presents with a functional swallow. Recommend a regular diet with thin liquids. VFSS recommended  as pt states he coughed with breakfast and his sister stated he coughed during lunch. VFSS recommended.         SLP Outcome Measures (last 72 hours)      SLP Outcome Measures     Row Name 05/04/19 1300             SLP Outcome Measures    Outcome Measure Used?  Adult NOMS  -         Adult FCM Scores    FCM Chosen  Swallowing  -      Swallowing FCM Score  7  -        User Key  (r) = Recorded By, (t) = Taken By, (c) = Cosigned By    Initials Name Effective Dates    Deanna Troy MS CCC-SLP 06/08/18 -            Time Calculation:   Time Calculation- SLP     Row Name 05/04/19 1412             Time Calculation- SLP    SLP Received On  05/04/19  -        User Key  (r) = Recorded By, (t) = Taken By, (c) = Cosigned By    Initials Name Provider Type    Deanna Troy MS CCC-SLP Speech and Language Pathologist          Therapy Charges for Today     Code Description Service Date Service Provider Modifiers Qty    [id number]  ST EVAL ORAL PHARYNG SWALLOW 4 5/4/2019 Deanna Martin MS CCC-SLP GN 1               Deanna Martin MS CCC-SLP  5/4/2019

## 2019-05-04 NOTE — PLAN OF CARE
Problem: Patient Care Overview  Goal: Plan of Care Review  Outcome: Ongoing (interventions implemented as appropriate)   05/04/19 0412   Coping/Psychosocial   Plan of Care Reviewed With patient   Plan of Care Review   Progress improving   OTHER   Outcome Summary Weakness remains in LLE, minimal in LUE, numbness in hand baseline. VSS. Ambulating with 1 assit to bathroom. MRI completed. Continue to monitor.        Problem: Fall Risk (Adult)  Goal: Identify Related Risk Factors and Signs and Symptoms  Outcome: Ongoing (interventions implemented as appropriate)    Goal: Absence of Fall  Outcome: Ongoing (interventions implemented as appropriate)      Problem: Stroke (Ischemic) (Adult)  Goal: Signs and Symptoms of Listed Potential Problems Will be Absent, Minimized or Managed (Stroke)  Outcome: Ongoing (interventions implemented as appropriate)

## 2019-05-04 NOTE — PROGRESS NOTES
DOS: 2019  NAME: Rocky Logan Jr.   : 1960  PCP: Karen Martinez APRN    Chief Complaint   Patient presents with   • Extremity Weakness        Stroke    Subjective: Pt seen in follow up, however the problem is new to me. No acute events overnight, had trouble sleeping. Denies any worsening weakness, new numbness, speech or visual disturbances. Family at bedside. Having a mild headache.    Objective:  Vital signs:      Vitals:    19 2100 19 2317 19 0352 19 0726   BP: 174/89 163/91     BP Location: Left arm Right arm     Patient Position: Lying Lying     Pulse: 75      Resp: 20 20  18   Temp: 98.5 °F (36.9 °C) 98.7 °F (37.1 °C)  98.3 °F (36.8 °C)   TempSrc: Oral Oral  Oral   SpO2: 97%      Weight:   101 kg (222 lb 4.8 oz)  Comment: Zeroed pt bed before weighing.    Height:           Current Facility-Administered Medications:   •  acetaminophen (TYLENOL) tablet 650 mg, 650 mg, Oral, Q4H PRN **OR** acetaminophen (TYLENOL) suppository 650 mg, 650 mg, Rectal, Q4H PRN, Manohar Li MD  •  amitriptyline (ELAVIL) tablet 25 mg, 25 mg, Oral, Nightly, Manohar Li MD, 25 mg at 19  •  aspirin-dipyridamole (AGGRENOX)  MG per 12 hr capsule 1 capsule, 1 capsule, Oral, BID, Manohar Li MD, 1 capsule at 19  •  atorvastatin (LIPITOR) tablet 80 mg, 80 mg, Oral, Daily, Manohar Li MD, 80 mg at 19  •  carvedilol (COREG) tablet 25 mg, 25 mg, Oral, BID With Meals, Manohar Li MD, 25 mg at 19  •  cholecalciferol (VITAMIN D3) tablet 1,000 Units, 1,000 Units, Oral, BID, Manohar Li MD, 1,000 Units at 19  •  cyclobenzaprine (FLEXERIL) tablet 10 mg, 10 mg, Oral, Nightly PRN, Manohar Li MD  •  dextrose (D50W) 25 g/ 50mL Intravenous Solution 25 g, 25 g, Intravenous, Q15 Min PRN, Manohar Li MD  •  dextrose (GLUTOSE) oral gel 15 g, 15 g, Oral, Q15 Min PRN, Manohar Li MD  •  glucagon (human recombinant) (GLUCAGEN  DIAGNOSTIC) injection 1 mg, 1 mg, Subcutaneous, PRN, Manohar Li MD  •  [START ON 5/5/2019] insulin glargine (LANTUS) injection 20 Units, 20 Units, Subcutaneous, QAM, Ramses Carrasco MD  •  insulin glargine (LANTUS) injection 38 Units, 38 Units, Subcutaneous, Nightly, Ramses Carrasco MD  •  insulin lispro (humaLOG) injection 0-14 Units, 0-14 Units, Subcutaneous, 4x Daily With Meals & Nightly, Manohar Li MD, 4 Units at 05/03/19 2146  •  insulin lispro (humaLOG) injection 10 Units, 10 Units, Subcutaneous, TID With Meals, Ramses Carrasco MD  •  levETIRAcetam (KEPPRA) tablet 500 mg, 500 mg, Oral, Q12H, Manohar Li MD, 500 mg at 05/04/19 0926  •  levothyroxine (SYNTHROID, LEVOTHROID) tablet 50 mcg, 50 mcg, Oral, Daily, Manohar Li MD, 50 mcg at 05/04/19 0926  •  linagliptin (TRADJENTA) tablet 5 mg, 5 mg, Oral, Daily, Manohar Li MD, 5 mg at 05/04/19 0925  •  ondansetron (ZOFRAN) tablet 4 mg, 4 mg, Oral, Q6H PRN **OR** ondansetron (ZOFRAN) injection 4 mg, 4 mg, Intravenous, Q6H PRN, Manohar Li MD  •  pantoprazole (PROTONIX) EC tablet 40 mg, 40 mg, Oral, QAM, Manohar Li MD, 40 mg at 05/04/19 0926  •  [COMPLETED] Insert peripheral IV, , , Once **AND** sodium chloride 0.9 % flush 10 mL, 10 mL, Intravenous, PRN, Oseas Obando MD  •  sodium chloride 0.9 % flush 3 mL, 3 mL, Intravenous, Q12H, Manohar Li MD, 3 mL at 05/04/19 0928  •  sodium chloride 0.9 % flush 3-10 mL, 3-10 mL, Intravenous, PRN, Manohar Li MD  •  sodium chloride 0.9 % infusion, 50 mL/hr, Intravenous, Continuous, Ramses Carrasco MD, Last Rate: 50 mL/hr at 05/04/19 1101, 50 mL/hr at 05/04/19 1101    PRN meds  •  acetaminophen **OR** acetaminophen  •  cyclobenzaprine  •  dextrose  •  dextrose  •  glucagon (human recombinant)  •  ondansetron **OR** ondansetron  •  [COMPLETED] Insert peripheral IV **AND** sodium chloride  •  sodium chloride    No current facility-administered medications on file  prior to encounter.      Current Outpatient Medications on File Prior to Encounter   Medication Sig   • amitriptyline (ELAVIL) 25 MG tablet Take 25 mg by mouth Every Night.   • amLODIPine (NORVASC) 10 MG tablet Take 1 tablet by mouth Daily.   • aspirin-dipyridamole (AGGRENOX)  MG per 12 hr capsule Take 1 capsule by mouth 2 (Two) Times a Day.   • atorvastatin (LIPITOR) 80 MG tablet TAKE ONE TABLET BY MOUTH DAILY   • carbonyl iron (FEOSOL) 45 MG tablet tablet Take 1 tablet by mouth Every Night.   • carvedilol (COREG) 25 MG tablet Take 25 mg by mouth 2 (Two) Times a Day With Meals.   • cholecalciferol (VITAMIN D3) 1000 units tablet Take 1,000 Units by mouth 2 (Two) Times a Day.   • glucose blood (ACCU-CHEK MILENA) test strip To check to check 3 - 4 times   • glucose blood (ACCU-CHEK COMPACT PLUS) test strip To check 3 - 4 times a day   • glucose blood (FREESTYLE LITE) test strip USE TO TEST BLOOD SUGAR 4 TIMES DAILY ICD 10 CODE E11.9   • Insulin Lispro (HUMALOG KWIKPEN) 100 UNIT/ML solution pen-injector Inject 14 Units under the skin into the appropriate area as directed 3 (Three) Times a Day. 14 UNITS WITH BF 16 UNIT WITH LUNCH 18 UNITS WITH DINNER    THEN USES SLIDING SCALE ON TOP OF SCHEDULED DOSING   • Lancets (ACCU-CHEK SOFT TOUCH) lancets To check 3 - 4 times a day   • levETIRAcetam (KEPPRA) 500 MG tablet Take 500 mg by mouth 2 (Two) Times a Day.   • levothyroxine (SYNTHROID, LEVOTHROID) 50 MCG tablet Take 1 tablet by mouth Daily.   • linagliptin (TRADJENTA) 5 MG tablet tablet Take 5 mg by mouth Daily.   • lisinopril (PRINIVIL,ZESTRIL) 40 MG tablet Take 1 tablet by mouth Daily.   • Multiple Vitamins-Minerals (MULTIVITAMIN ADULT PO) Take 1 tablet by mouth Daily.   • omega-3 acid ethyl esters (LOVAZA) 1 g capsule TAKE TWO CAPSULES BY MOUTH DAILY   • omeprazole (priLOSEC) 40 MG capsule Take 40 mg by mouth Daily.   • TOUJEO SOLOSTAR 300 UNIT/ML solution pen-injector INJECT 30 UNITS SUBCUTANEOUSLY EVERY MORNING  AND 60 UNITS EVERY NIGHT AT BEDTIME   • TURMERIC PO Take 1 tablet by mouth 2 (Two) Times a Day.   • cyclobenzaprine (FLEXERIL) 10 MG tablet Take 10 mg by mouth At Night As Needed.   • glucosamine-chondroitin 500-400 MG capsule capsule Take 1 capsule by mouth Daily.       General appearance: NAD, alert and cooperative, well groomed  HEENT: Normocephalic, atraumatic, PERRL, no masses or tenderness  COR: RRR  Resp: Even and unlabored  Extremities: Equal pulses  Skin: warm, dry    Neurological:   MS: oriented x3, recent/remote memory intact, normal attention/concentration, language intact, no neglect, normal fund of knowledge  CN: visual acuity grossly normal, has some right eye impairment due to meningioma, visual fields full, PERRL, EOMI, facial sensation equal, no facial droop, hearing symmetric, palate elevates symmetrically, shoulder shrug decreased on left, tongue midline, left arm drift  Motor: 5/5 on right, +4/5 on LUE, 5/5 in BLE  Sensory: light touch sensation intact in all 4 ext.  Coordination: Normal finger to nose test on right, dysmetria on left, normal heel to shin test    Laboratory results:  Lab Results   Component Value Date    TSH 3.700 10/30/2018     Lab Results   Component Value Date    HGBA1C 7.10 (H) 05/04/2019     Lab Results   Component Value Date    UPZQPMBQ88 697 10/30/2018     Lab Results   Component Value Date    CHOL 175 05/04/2019    CHLPL 179 10/30/2018    CHLPL 214 (H) 07/24/2018    CHLPL 201 (H) 01/15/2018     Lab Results   Component Value Date    TRIG 211 (H) 05/04/2019    TRIG 133 10/30/2018    TRIG 78 07/24/2018     Lab Results   Component Value Date    HDL 25 (L) 05/04/2019    HDL 35 (L) 10/30/2018    HDL 45 07/24/2018     Lab Results   Component Value Date     (H) 05/04/2019     (H) 10/30/2018     (H) 07/24/2018     Lab Results   Component Value Date    WBC 7.37 05/04/2019    HGB 10.2 (L) 05/04/2019    HCT 29.8 (L) 05/04/2019    MCV 86.1 05/04/2019      05/04/2019     Lab Results   Component Value Date    GLUCOSE 86 05/04/2019    BUN 23 (H) 05/04/2019    CREATININE 2.05 (H) 05/04/2019    EGFRIFNONA 33 (L) 05/04/2019    EGFRIFAFRI 42 (L) 01/31/2019    BCR 11.2 05/04/2019    K 3.7 05/04/2019    CO2 22.1 05/04/2019    CALCIUM 8.3 (L) 05/04/2019    PROTENTOTREF 6.2 02/07/2018    ALBUMIN 3.20 (L) 05/04/2019    LABIL2 1.6 02/07/2018    AST 17 05/04/2019    ALT 16 05/04/2019     No results found for: PTT  No results found for: INR, PROTIME  Brief Urine Lab Results     None          Review and interpretation of imaging:  Ct Head Without Contrast    Result Date: 5/3/2019  1. Given the difference in modality, no significant change when compared to prior MRI of the head [facility] 05/17/2017 with no acute abnormality seen on this head CT. 2. 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 There is a large area of encephalomalacia involving the inferior lateral right frontal lobe and the anterior and superior right temporal lobe with the area of encephalomalacia measuring 8 x 5 x 5.8 cm, unchanged.  There is a lentiform shaped area of soft tissue density in the anterior inferior medial aspect of the right middle cranial fossa measuring 14 x 9 mm in size, unchanged since MRI 05/17/2017, it could be scar.  A small focus of residual or recurrent meningioma that is stable since 05/17/2017 cannot be excluded. 3. Mild small vessel disease in the cerebral white matter and there are extensive calcified atherosclerotic plaques in the intracranial segments of the distal vertebral arteries and cavernous and supracavernous segments of the internal carotid arteries bilaterally.  The remainder of the head CT is unremarkable. If there remains any clinical suspicion of an acute stroke causing the patient's left-sided weakness, I recommend an MRI of the  brain for more complete assessment.  The results and recommendations were communicated to Dr. Obando from the emergency room by telephone 05/03/2019 at 12:30 PM.  Radiation dose reduction techniques were utilized, including automated exposure control and exposure modulation based on body size.  This report was finalized on 5/3/2019 3:39 PM by Dr. Alvin Hutchison M.D.      Mri Angiogram Head Without Contrast    Result Date: 5/3/2019  1. There is a 9 mm area of restricted diffusion in the right medulla at its junction with the jordin that is consistent with an area of acute ischemia. 2. Normal MRA of the head and neck without contrast.  NASCET criteria were used in this interpretation. 3. There is stable encephalomalacia in the right frontal and parietal lobes in the area of prior infarction associated with a prior craniotomy.  These findings were communicated to the stroke physician on-call, Dr. Winkler, on 05/03/2019 at 8:55 PM.  This report was finalized on 5/3/2019 9:01 PM by Dr. Srini Mckeon M.D.      Mri Angiogram Neck Without Contrast    Result Date: 5/3/2019  1. There is a 9 mm area of restricted diffusion in the right medulla at its junction with the jordin that is consistent with an area of acute ischemia. 2. Normal MRA of the head and neck without contrast.  NASCET criteria were used in this interpretation. 3. There is stable encephalomalacia in the right frontal and parietal lobes in the area of prior infarction associated with a prior craniotomy.  These findings were communicated to the stroke physician on-call, Dr. Winkler, on 05/03/2019 at 8:55 PM.  This report was finalized on 5/3/2019 9:01 PM by Dr. Srini Mckeon M.D.      Mri Brain Without Contrast    Result Date: 5/3/2019  1. There is a 9 mm area of restricted diffusion in the right medulla at its junction with the jordin that is consistent with an area of acute ischemia. 2. Normal MRA of the head and neck without contrast.  NASCET criteria were used  in this interpretation. 3. There is stable encephalomalacia in the right frontal and parietal lobes in the area of prior infarction associated with a prior craniotomy.  These findings were communicated to the stroke physician on-call, Dr. Winkler, on 05/03/2019 at 8:55 PM.  This report was finalized on 5/3/2019 9:01 PM by Dr. Srini Mckeon M.D.      Results for orders placed during the hospital encounter of 05/03/19   Adult Transthoracic Echo Complete W/ Cont if Necessary Per Protocol (With Agitated Saline)    Narrative · Left ventricular systolic function is normal. Calculated EF = 64.0%.   Estimated EF was in agreement with the calculated EF. Normal left   ventricular cavity size noted. All left ventricular wall segments contract   normally.  · Left ventricular wall thickness is consistent with severe concentric   hypertrophy.  · Left ventricular diastolic dysfunction is noted (grade II w/high LAP)   consistent with pseudonormalization.  · There is mild calcification of the aortic valve.  · The mitral valve is abnormal in structure. MAC is present.  · No evidence of a patent foramen ovale. Saline test results are negative.            Impression/Assessment:  This is a 57 yo male with past medical history of meningioma status post resection, right pontine stroke, diabetes, hypertension, on Aggrenox prior to admission who presented to the hospital with complaints of left-sided weakness.  He also had associated difficulty walking.  He does have some mild residual left-sided weakness from his prior pontine stroke.  Blood pressure on arrival 169/87 with a heart rate of 79.  MRI brain obtained showing a new right pontine stroke.  MRA head neck with no significant stenosis. 2D Echo with normal left atrium size noted, no PFO noted, mild calcification on aortic valve.    1.  Acute right pontine stroke likely secondary to intracranial arterial disease.  2.  History of pontine stroke  3.  History of meningioma status post  partial resection  4.  Seizure disorder    We will discontinue Aggrenox and start aspirin 81 mg and Brilinta 60 mg twice daily.  He will need aggressive risk factor control.  Continue statin.  Encourage p.o. intake.  He will need to follow-up with me in 3 months in our neurology clinic for stroke follow-up. Other plans as stated below. Therapies as written. CCP for discharge planning. Call RRT for any acute neurological changes. We will sign off, will see again per request.    Plan:  D/C Aggrenox  Start ASA 81mg and Brilinta 60mg BID  Follow up with me in our office in 3 months for stroke follow up  Lipitor 80mg,   Encourage PO intake  Neurochecks per stroke protocol  Normalize BP  Stroke Education  BUCK/SCDs  PT/OT/ST  We will sign off, will see again per request.    Case discussed with patient and Dr. Kendall, and he agrees with plan above.   SHOBHA Batista    **Dante Disclaimer:**  Much of this encounter note is an electronic transcription/translation of spoken language to printed text. The electronic translation of spoken language may permit erroneous, or at times, nonsensical words or phrases to be inadvertently transcribed. Although I have reviewed the note for such errors, some may still exist.

## 2019-05-04 NOTE — PROGRESS NOTES
Name: Rocky Logan Jr. ADMIT: 5/3/2019   : 1960  PCP: Karen Martinez APRN    MRN: [id number] LOS: 0 days   AGE/SEX: 58 y.o. male  ROOM: [facility]   Subjective   Chief Complaint   Patient presents with   • Extremity Weakness   still with left sided weakness  Upper and lower extremities  Difficulty with ambulation and transfers  Had MRI yesterday  BG     ROS  No f/c  No n/v  No cp/palp  No soa/cough  No facial droop or speech difficulties     Objective   Vital Signs  Temp:  [97.6 °F (36.4 °C)-98.7 °F (37.1 °C)] 98.3 °F (36.8 °C)  Heart Rate:  [69-85] 75  Resp:  [15-20] 18  BP: (149-177)/(80-91) 163/91  SpO2:  [96 %-98 %] 97 %  on   ;   Device (Oxygen Therapy): room air  Body mass index is 35.9 kg/m².    Physical Exam   Constitutional: He is oriented to person, place, and time. He appears well-developed and well-nourished. No distress.   HENT:   Head: Normocephalic and atraumatic.   Mouth/Throat: Oropharynx is clear and moist.   Eyes: Conjunctivae are normal. No scleral icterus.   Neck: Normal range of motion. Neck supple.   Cardiovascular: Normal rate, regular rhythm and normal heart sounds.   No murmur heard.  Pulmonary/Chest: Effort normal and breath sounds normal. No respiratory distress.   Abdominal: Soft. Bowel sounds are normal. There is no tenderness.   Genitourinary:   Genitourinary Comments: Deferred    Musculoskeletal: He exhibits no edema or deformity.   Neurological: He is alert and oriented to person, place, and time. No cranial nerve deficit.   4/5 strength in LUE and LLE   Skin: Skin is warm and dry. He is not diaphoretic.   Psychiatric: He has a normal mood and affect. His behavior is normal. Thought content normal.   Nursing note and vitals reviewed.      Results Review:       I reviewed the patient's new clinical results.  Results from last 7 days   Lab Units 19  0433 19  1331   WBC 10*3/mm3 7.37 8.10   HEMOGLOBIN g/dL 10.2* 11.7*   PLATELETS 10*3/mm3 156 168     Results  from last 7 days   Lab Units 05/04/19  0433 05/03/19  1404   SODIUM mmol/L 144 145   POTASSIUM mmol/L 3.7 4.8   CHLORIDE mmol/L 112* 111*   CO2 mmol/L 22.1 22.8   BUN mg/dL 23* 26*   CREATININE mg/dL 2.05* 2.00*   GLUCOSE mg/dL 86 131*   Estimated Creatinine Clearance: 43.2 mL/min (A) (by C-G formula based on SCr of 2.05 mg/dL (H)).  Results from last 7 days   Lab Units 05/04/19  0433   ALBUMIN g/dL 3.20*   BILIRUBIN mg/dL 0.2   ALK PHOS U/L 94   AST (SGOT) U/L 17   ALT (SGPT) U/L 16     Results from last 7 days   Lab Units 05/04/19  0433 05/03/19  1404   CALCIUM mg/dL 8.3* 8.8   ALBUMIN g/dL 3.20*  --          Coag     HbA1C   Lab Results   Component Value Date    HGBA1C 7.10 (H) 05/04/2019    HGBA1C 7.40 (H) 01/31/2019    HGBA1C 7.21 (H) 10/30/2018     Infection     Radiology(recent) Ct Head Without Contrast    Result Date: 5/3/2019  1. Given the difference in modality, no significant change when compared to prior MRI of the head from [facility] 05/17/2017 with no acute abnormality seen on this head CT. 2. 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 There is a large area of encephalomalacia involving the inferior lateral right frontal lobe and the anterior and superior right temporal lobe with the area of encephalomalacia measuring 8 x 5 x 5.8 cm, unchanged.  There is a lentiform shaped area of soft tissue density in the anterior inferior medial aspect of the right middle cranial fossa measuring 14 x 9 mm in size, unchanged since MRI 05/17/2017, it could be scar.  A small focus of residual or recurrent meningioma that is stable since 05/17/2017 cannot be excluded. 3. Mild small vessel disease in the cerebral white matter and there are extensive calcified atherosclerotic plaques in the intracranial segments of the distal vertebral arteries and cavernous and supracavernous  segments of the internal carotid arteries bilaterally.  The remainder of the head CT is unremarkable. If there remains any clinical suspicion of an acute stroke causing the patient's left-sided weakness, I recommend an MRI of the brain for more complete assessment.  The results and recommendations were communicated to Dr. Obando from the emergency room by telephone 05/03/2019 at 12:30 PM.  Radiation dose reduction techniques were utilized, including automated exposure control and exposure modulation based on body size.  This report was finalized on 5/3/2019 3:39 PM by Dr. Alvin Hutchison M.D.      Mri Angiogram Head Without Contrast    Result Date: 5/3/2019  1. There is a 9 mm area of restricted diffusion in the right medulla at its junction with the jordin that is consistent with an area of acute ischemia. 2. Normal MRA of the head and neck without contrast.  NASCET criteria were used in this interpretation. 3. There is stable encephalomalacia in the right frontal and parietal lobes in the area of prior infarction associated with a prior craniotomy.  These findings were communicated to the stroke physician on-call, Dr. Winkler, on 05/03/2019 at 8:55 PM.  This report was finalized on 5/3/2019 9:01 PM by Dr. Srini Mckeon M.D.      Mri Angiogram Neck Without Contrast    Result Date: 5/3/2019  1. There is a 9 mm area of restricted diffusion in the right medulla at its junction with the jordin that is consistent with an area of acute ischemia. 2. Normal MRA of the head and neck without contrast.  NASCET criteria were used in this interpretation. 3. There is stable encephalomalacia in the right frontal and parietal lobes in the area of prior infarction associated with a prior craniotomy.  These findings were communicated to the stroke physician on-call, Dr. Winkler, on 05/03/2019 at 8:55 PM.  This report was finalized on 5/3/2019 9:01 PM by Dr. Srini Mckeon M.D.      Mri Brain Without Contrast    Result Date:  5/3/2019  1. There is a 9 mm area of restricted diffusion in the right medulla at its junction with the jordin that is consistent with an area of acute ischemia. 2. Normal MRA of the head and neck without contrast.  NASCET criteria were used in this interpretation. 3. There is stable encephalomalacia in the right frontal and parietal lobes in the area of prior infarction associated with a prior craniotomy.  These findings were communicated to the stroke physician on-call, Dr. Winkler, on 05/03/2019 at 8:55 PM.  This report was finalized on 5/3/2019 9:01 PM by Dr. Srini Mckeon M.D.      No results found for: TROPONINT, TROPONINI, BNP  No components found for: TSH;2      amitriptyline 25 mg Oral Nightly   aspirin-dipyridamole 1 capsule Oral BID   atorvastatin 80 mg Oral Daily   carvedilol 25 mg Oral BID With Meals   cholecalciferol 1,000 Units Oral BID   insulin glargine 25 Units Subcutaneous QAM   insulin glargine 45 Units Subcutaneous Nightly   insulin lispro 0-14 Units Subcutaneous 4x Daily With Meals & Nightly   insulin lispro 14 Units Subcutaneous TID With Meals   levETIRAcetam 500 mg Oral Q12H   levothyroxine 50 mcg Oral Daily   linagliptin 5 mg Oral Daily   pantoprazole 40 mg Oral QAM   sodium chloride 3 mL Intravenous Q12H       sodium chloride 100 mL/hr Last Rate: 100 mL/hr (05/04/19 1014)   Diet Regular; Cardiac, Consistent Carbohydrate, Renal      Assessment/Plan      Active Hospital Problems    Diagnosis  POA   • **Acute ischemic stroke (CMS/HCC) [I63.9]  Yes   • Left leg weakness [R29.898]  Yes   • HTN (hypertension) [I10]  Yes   • GERD without esophagitis [K21.9]  Yes   • History of pontine stroke [Z86.73]  Not Applicable   • CKD (chronic kidney disease) stage 3, GFR 30-59 ml/min (CMS/HCC) [N18.3]  Yes   • History of meningioma of the brain [Z86.011]  Not Applicable   • Subclinical hypothyroidism [E03.9]  Yes   • Type 2 diabetes mellitus with complication, with long-term current use of insulin (CMS/[facility])  [E11.8, Z79.4]  Not Applicable   • Vitamin D deficiency [E55.9]  Yes   • Hyperlipidemia [E78.5]  Yes      Resolved Hospital Problems   No resolved problems to display.       · On aggrenox, will see what Neuro thinks if need to change antiplatelet   · Monitor for arrhythmia on telemetery  · On insulin, monitor BG, decrease insulin a little with relatively low BG  · Monitor renal fx, on gentle fluids  · Permissive HTN, may need to adjust BP meds here and at discharge  · PT/OT/ST      Reviewed previous records      Ramsse Carrasco MD  [facility]  05/04/19  10:50 AM

## 2019-05-04 NOTE — PLAN OF CARE
Problem: Patient Care Overview  Goal: Plan of Care Review  Outcome: Ongoing (interventions implemented as appropriate)   05/04/19 1426   Coping/Psychosocial   Plan of Care Reviewed With patient   Plan of Care Review   Progress improving   OTHER   Outcome Summary No neuro changes today. NIH 3. BP high-permissible.      Goal: Individualization and Mutuality  Outcome: Ongoing (interventions implemented as appropriate)    Goal: Discharge Needs Assessment  Outcome: Ongoing (interventions implemented as appropriate)    Goal: Interprofessional Rounds/Family Conf  Outcome: Ongoing (interventions implemented as appropriate)      Problem: Fall Risk (Adult)  Goal: Absence of Fall  Outcome: Ongoing (interventions implemented as appropriate)      Problem: Stroke (Ischemic) (Adult)  Goal: Signs and Symptoms of Listed Potential Problems Will be Absent, Minimized or Managed (Stroke)  Outcome: Ongoing (interventions implemented as appropriate)

## 2019-05-05 ENCOUNTER — APPOINTMENT (OUTPATIENT)
Dept: CT IMAGING | Facility: HOSPITAL | Age: 59
End: 2019-05-05

## 2019-05-05 LAB
ANION GAP SERPL CALCULATED.3IONS-SCNC: 11.4 MMOL/L
BUN BLD-MCNC: 21 MG/DL (ref 6–20)
BUN/CREAT SERPL: 11.4 (ref 7–25)
CALCIUM SPEC-SCNC: 8.4 MG/DL (ref 8.6–10.5)
CHLORIDE SERPL-SCNC: 111 MMOL/L (ref 98–107)
CO2 SERPL-SCNC: 21.6 MMOL/L (ref 22–29)
CREAT BLD-MCNC: 1.84 MG/DL (ref 0.76–1.27)
GFR SERPL CREATININE-BSD FRML MDRD: 38 ML/MIN/1.73
GLUCOSE BLD-MCNC: 92 MG/DL (ref 65–99)
GLUCOSE BLDC GLUCOMTR-MCNC: 101 MG/DL (ref 70–130)
GLUCOSE BLDC GLUCOMTR-MCNC: 164 MG/DL (ref 70–130)
GLUCOSE BLDC GLUCOMTR-MCNC: 64 MG/DL (ref 70–130)
GLUCOSE BLDC GLUCOMTR-MCNC: 69 MG/DL (ref 70–130)
GLUCOSE BLDC GLUCOMTR-MCNC: 74 MG/DL (ref 70–130)
GLUCOSE BLDC GLUCOMTR-MCNC: 86 MG/DL (ref 70–130)
GLUCOSE BLDC GLUCOMTR-MCNC: 89 MG/DL (ref 70–130)
POTASSIUM BLD-SCNC: 3.9 MMOL/L (ref 3.5–5.2)
SODIUM BLD-SCNC: 144 MMOL/L (ref 136–145)

## 2019-05-05 PROCEDURE — 80048 BASIC METABOLIC PNL TOTAL CA: CPT | Performed by: INTERNAL MEDICINE

## 2019-05-05 PROCEDURE — 70450 CT HEAD/BRAIN W/O DYE: CPT

## 2019-05-05 PROCEDURE — 97162 PT EVAL MOD COMPLEX 30 MIN: CPT

## 2019-05-05 PROCEDURE — 63710000001 INSULIN LISPRO (HUMAN) PER 5 UNITS: Performed by: INTERNAL MEDICINE

## 2019-05-05 PROCEDURE — 63710000001 INSULIN LISPRO (HUMAN) PER 5 UNITS: Performed by: HOSPITALIST

## 2019-05-05 PROCEDURE — 82962 GLUCOSE BLOOD TEST: CPT

## 2019-05-05 PROCEDURE — 97110 THERAPEUTIC EXERCISES: CPT

## 2019-05-05 PROCEDURE — 63710000001 INSULIN GLARGINE PER 5 UNITS: Performed by: INTERNAL MEDICINE

## 2019-05-05 RX ORDER — INSULIN GLARGINE 100 [IU]/ML
18 INJECTION, SOLUTION SUBCUTANEOUS EVERY MORNING
Status: DISCONTINUED | OUTPATIENT
Start: 2019-05-06 | End: 2019-05-06

## 2019-05-05 RX ORDER — AMLODIPINE BESYLATE 10 MG/1
10 TABLET ORAL
Status: DISCONTINUED | OUTPATIENT
Start: 2019-05-05 | End: 2019-05-10 | Stop reason: HOSPADM

## 2019-05-05 RX ORDER — INSULIN GLARGINE 100 [IU]/ML
34 INJECTION, SOLUTION SUBCUTANEOUS NIGHTLY
Status: DISCONTINUED | OUTPATIENT
Start: 2019-05-05 | End: 2019-05-06

## 2019-05-05 RX ADMIN — SODIUM CHLORIDE, PRESERVATIVE FREE 3 ML: 5 INJECTION INTRAVENOUS at 09:41

## 2019-05-05 RX ADMIN — TICAGRELOR 60 MG: 60 TABLET ORAL at 09:38

## 2019-05-05 RX ADMIN — ASPIRIN 81 MG: 81 TABLET, CHEWABLE ORAL at 09:38

## 2019-05-05 RX ADMIN — INSULIN LISPRO 10 UNITS: 100 INJECTION, SOLUTION INTRAVENOUS; SUBCUTANEOUS at 09:39

## 2019-05-05 RX ADMIN — VITAMIN D, TAB 1000IU (100/BT) 1000 UNITS: 25 TAB at 20:13

## 2019-05-05 RX ADMIN — LEVETIRACETAM 500 MG: 500 TABLET, FILM COATED ORAL at 09:38

## 2019-05-05 RX ADMIN — TICAGRELOR 60 MG: 60 TABLET ORAL at 20:13

## 2019-05-05 RX ADMIN — INSULIN LISPRO 10 UNITS: 100 INJECTION, SOLUTION INTRAVENOUS; SUBCUTANEOUS at 13:28

## 2019-05-05 RX ADMIN — PANTOPRAZOLE SODIUM 40 MG: 40 TABLET, DELAYED RELEASE ORAL at 05:20

## 2019-05-05 RX ADMIN — LEVETIRACETAM 500 MG: 500 TABLET, FILM COATED ORAL at 20:13

## 2019-05-05 RX ADMIN — ATORVASTATIN CALCIUM 80 MG: 80 TABLET, FILM COATED ORAL at 09:38

## 2019-05-05 RX ADMIN — INSULIN GLARGINE 20 UNITS: 100 INJECTION, SOLUTION SUBCUTANEOUS at 09:39

## 2019-05-05 RX ADMIN — AMITRIPTYLINE HYDROCHLORIDE 25 MG: 25 TABLET, FILM COATED ORAL at 20:13

## 2019-05-05 RX ADMIN — AMLODIPINE BESYLATE 10 MG: 10 TABLET ORAL at 17:38

## 2019-05-05 RX ADMIN — VITAMIN D, TAB 1000IU (100/BT) 1000 UNITS: 25 TAB at 09:38

## 2019-05-05 RX ADMIN — INSULIN LISPRO 3 UNITS: 100 INJECTION, SOLUTION INTRAVENOUS; SUBCUTANEOUS at 21:29

## 2019-05-05 RX ADMIN — LINAGLIPTIN 5 MG: 5 TABLET, FILM COATED ORAL at 09:38

## 2019-05-05 RX ADMIN — CARVEDILOL 25 MG: 25 TABLET, FILM COATED ORAL at 17:38

## 2019-05-05 RX ADMIN — LEVOTHYROXINE SODIUM 50 MCG: 50 TABLET ORAL at 09:38

## 2019-05-05 RX ADMIN — CARVEDILOL 25 MG: 25 TABLET, FILM COATED ORAL at 09:38

## 2019-05-05 NOTE — THERAPY EVALUATION
Acute Care - Physical Therapy Initial Evaluation  [facility]     Patient Name: Rocky Logan Jr.  : 1960  MRN: [id number]  Today's Date: 2019      Date of Referral to PT: 19         Admit Date: 5/3/2019    Visit Dx:     ICD-10-CM ICD-9-CM   1. Left leg weakness R29.898 729.89   2. Right pontine stroke (CMS/HCC) I63.50 434.91   3. Oropharyngeal dysphagia R13.12 787.22   4. Weakness generalized R53.1 780.79     Patient Active Problem List   Diagnosis   • Stroke (CMS/HCC)   • Type 2 diabetes mellitus with complication, with long-term current use of insulin (CMS/HCC)   • Hyperlipidemia   • Elevated TSH   • Vitamin D deficiency   • Microalbuminuria   • Subclinical hypothyroidism   • Left leg weakness   • HTN (hypertension)   • GERD without esophagitis   • History of pontine stroke   • CKD (chronic kidney disease) stage 3, GFR 30-59 ml/min (CMS/HCC)   • History of meningioma of the brain   • Acute ischemic stroke (CMS/HCC)     Past Medical History:   Diagnosis Date   • Diabetes mellitus (CMS/HCC)    • Hypertension    • Stroke (CMS/HCC)      Past Surgical History:   Procedure Laterality Date   • CARDIAC SURGERY     • HERNIA REPAIR          PT ASSESSMENT (last 12 hours)      Physical Therapy Evaluation     Row Name 19 0918          PT Evaluation Time/Intention    Subjective Information  complains of;weakness  -AL     Document Type  evaluation  -AL     Mode of Treatment  physical therapy  -AL     Patient Effort  good  -AL     Row Name 19 0918          General Information    Patient Profile Reviewed?  yes  -AL     Patient Observations  alert;cooperative;agree to therapy  -AL     Prior Level of Function  min assist:;all household mobility;gait;transfer;bed mobility;ADL's  -AL     Equipment Currently Used at Home  cane, straight;walker, rolling  -AL     Risks Reviewed  patient and family:  -AL     Benefits Reviewed  patient and family:  -AL     Barriers to Rehab  previous functional deficit  -AL      Row Name 05/05/19 0918          Relationship/Environment    Lives With  parent(s)  -AL     Row Name 05/05/19 0918          Resource/Environmental Concerns    Current Living Arrangements  home/apartment/condo  -AL     Row Name 05/05/19 0918          Cognitive Assessment/Intervention- PT/OT    Orientation Status (Cognition)  oriented x 4  -AL     Follows Commands (Cognition)  WFL  -AL     Safety Deficit (Cognitive)  mild deficit  -AL     Row Name 05/05/19 0918          Bed Mobility Assessment/Treatment    Bed Mobility Assessment/Treatment  supine-sit;sit-supine  -AL     Supine-Sit [place] (Bed Mobility)  not tested up in chair  -AL     Sit-Supine [place] (Bed Mobility)  minimum assist (75% patient effort);verbal cues;nonverbal cues (demo/gesture)  -AL     Row Name 05/05/19 0918          Transfer Assessment/Treatment    Transfer Assessment/Treatment  sit-stand transfer;stand-sit transfer  -AL     Maintains Weight-bearing Status (Transfers)  able to maintain  -AL     Sit-Stand [place] (Transfers)  minimum assist (75% patient effort);verbal cues;nonverbal cues (demo/gesture)  -AL     Stand-Sit [place] (Transfers)  minimum assist (75% patient effort);verbal cues;nonverbal cues (demo/gesture)  -AL     Row Name 05/05/19 0918          Sit-Stand Transfer    Assistive Device (Sit-Stand Transfers)  walker, front-wheeled  -AL     Row Name 05/05/19 0918          Stand-Sit Transfer    Assistive Device (Stand-Sit Transfers)  walker, front-wheeled  -AL     Row Name 05/05/19 0918          Gait/Stairs Assessment/Training    Gait/Stairs Assessment/Training  gait/ambulation independence;gait/ambulation assistive device  -AL     [place] Level (Gait)  moderate assist (50% patient effort);verbal cues;nonverbal cues (demo/gesture)  -AL     Assistive Device (Gait)  walker, front-wheeled  -AL     Distance in Feet (Gait)  90  -AL     Pattern (Gait)  step-to  -AL     Deviations/Abnormal Patterns (Gait)  [place]  decreased;gait speed decreased;stride length decreased  -AL     Bilateral Gait Deviations  forward flexed posture  -AL     Row Name 05/05/19 0918          General ROM    GENERAL ROM COMMENTS  decreased ROM on L side secondary to CVA  -AL     Row Name 05/05/19 0918          MMT (Manual Muscle Testing)    General MMT Comments  decreased MMT on L side secondary to CVA  -AL     Row Name 05/05/19 0918          Coping    Observed Emotional State  accepting;calm;cooperative  -AL     Verbalized Emotional State  acceptance  -AL     Row Name 05/05/19 0918          Plan of Care Review    Plan of Care Reviewed With  patient  -AL     Row Name 05/05/19 0918          Physical Therapy Clinical Impression    Date of Referral to PT  05/04/19  -AL     PT Diagnosis (PT Clinical Impression)  L side weakness  -AL     Prognosis (PT Clinical Impression)  good  -AL     Functional Level at Time of Evaluation (PT Clinical Impression)  min-mod assistance  -AL     Rehab Potential (PT Clinical Summary)  good, to achieve stated therapy goals  -AL     Care Plan Review (PT)  evaluation/treatment results reviewed  -AL     Patient/Family Concerns, Anticipated Discharge Disposition (PT)  -- His parents (who are in their 80's) can not help him much.  -AL     Row Name 05/05/19 0918          Physical Therapy Goals    Bed Mobility Goal Selection (PT)  bed mobility, PT goal 1  -AL     Transfer Goal Selection (PT)  transfer, PT goal 1  -AL     Gait Training Goal Selection (PT)  gait training, PT goal 1  -AL     Row Name 05/05/19 0918          Bed Mobility Goal 1 (PT)    Activity/Assistive Device (Bed Mobility Goal 1, PT)  sit to supine;supine to sit  -AL     [place] Level/Cues Needed (Bed Mobility Goal 1, PT)  contact guard assist  -AL     Time Frame (Bed Mobility Goal 1, PT)  1 week  -AL     Row Name 05/05/19 0918          Transfer Goal 1 (PT)    Activity/Assistive Device (Transfer Goal 1, PT)  sit-to-stand/stand-to-sit;walker, rolling  -AL      [place] Level/Cues Needed (Transfer Goal 1, PT)  contact guard assist  -AL     Time Frame (Transfer Goal 1, PT)  1 week  -AL     Row Name 05/05/19 0918          Gait Training Goal 1 (PT)    Activity/Assistive Device (Gait Training Goal 1, PT)  gait (walking locomotion);assistive device use;walker, rolling  -AL     [place] Level (Gait Training Goal 1, PT)  minimum assist (75% or more patient effort)  -AL     Distance (Gait Goal 1, PT)  100  -AL     Time Frame (Gait Training Goal 1, PT)  1 week  -AL     Row Name 05/05/19 0918          Positioning and Restraints    Pre-Treatment Position  sitting in chair/recliner  -AL     Post Treatment Position  bed  -AL     In Bed  supine;call light within reach;with family/caregiver;with nsg  -AL       User Key  (r) = Recorded By, (t) = Taken By, (c) = Cosigned By    Initials Name Provider Type    AL Ellie Holden, PT Physical Therapist        Physical Therapy Education     Title: PT OT SLP Therapies (In Progress)     Topic: Physical Therapy (In Progress)     Point: Mobility training (Done)     Learning Progress Summary           Patient Acceptance, E, VU,NR by AL at 5/5/2019  9:56 AM                   Point: Body mechanics (Done)     Learning Progress Summary           Patient Acceptance, E, VU,NR by AL at 5/5/2019  9:56 AM                   Point: Precautions (Done)     Learning Progress Summary           Patient Acceptance, E, VU,NR by AL at 5/5/2019  9:56 AM                               User Key     Initials Effective Dates Name Provider Type Discipline    AL 04/03/18 -  Ellie Holden, PT Physical Therapist PT              PT Recommendation and Plan  Anticipated Discharge Disposition (PT): inpatient rehabilitation facility(Rehab)  Therapy Frequency (PT Clinical Impression): daily  Outcome Summary/Treatment Plan (PT)  Anticipated Discharge Disposition (PT): inpatient rehabilitation facility(Rehab)  Patient/Family Concerns, Anticipated Discharge Disposition (PT): (His  parents (who are in their 80's) can not help him much.)  Plan of Care Reviewed With: patient  Outcome Summary: Patient is appropriate for skilled PT secondary to decrease strength on the left side, impaired gait, and unable to negotiate stairs. Lives with elderly parents who can not help at this time. Recommend Rehab once D/C'ed from hospital to help patient and decrease load on family. Will continue to treat patient while in this facility.  Outcome Measures     Row Name 05/05/19 0900             How much help from another person do you currently need...    Turning from your back to your side while in flat bed without using bedrails?  3  -AL      Moving from lying on back to sitting on the side of a flat bed without bedrails?  3  -AL      Moving to and from a bed to a chair (including a wheelchair)?  3  -AL      Standing up from a chair using your arms (e.g., wheelchair, bedside chair)?  3  -AL      Climbing 3-5 steps with a railing?  1  -AL      To walk in hospital room?  2  -AL      AM-PAC 6 Clicks Score  15  -AL         Functional Assessment    Outcome Measure Options  AM-PAC 6 Clicks Basic Mobility (PT)  -AL        User Key  (r) = Recorded By, (t) = Taken By, (c) = Cosigned By    Initials Name Provider Type    Ellie Todd, PT Physical Therapist         Time Calculation:   PT Charges     Row Name 05/05/19 0958             Time Calculation    Start Time  0918  -AL      Stop Time  0934  -AL      Time Calculation (min)  16 min  -AL      PT Received On  05/05/19  -AL      PT - Next Appointment  05/06/19  -AL      PT Goal Re-Cert Due Date  05/12/19  -AL        User Key  (r) = Recorded By, (t) = Taken By, (c) = Cosigned By    Initials Name Provider Type    Ellie Todd, PT Physical Therapist        Therapy Charges for Today     Code Description Service Date Service Provider Modifiers Qty    [id number]  PT EVAL MOD COMPLEXITY 2 5/5/2019 Ellie Holden, PT GP 1    [id number]  PT THER PROC EA 15 MIN 5/5/2019  Ellie Holden, PT GP 1          PT G-Codes  Outcome Measure Options: AM-PAC 6 Clicks Basic Mobility (PT)  AM-PAC 6 Clicks Score: 15      Ellie Holden, PT  5/5/2019

## 2019-05-05 NOTE — PROGRESS NOTES
Name: Rocky Logan Jr. ADMIT: 5/3/2019   : 1960  PCP: Karen Martinez APRN    MRN: [id number] LOS: 1 days   AGE/SEX: 58 y.o. male  ROOM: [facility]   Subjective   Chief Complaint   Patient presents with   • Extremity Weakness   still with left sided weakness  Upper and lower extremities  Feels LUE weakness worse today  Difficulty with ambulation and transfers  Working with therapists   BG     ROS  No f/c  No n/v  No cp/palp  No soa/cough  No facial droop or speech difficulties     Objective   Vital Signs  Temp:  [98 °F (36.7 °C)-98.3 °F (36.8 °C)] 98.2 °F (36.8 °C)  Heart Rate:  [73-82] 73  Resp:  [16-18] 16  BP: (163-187)/(81-95) 163/81  SpO2:  [97 %-98 %] 97 %  on   ;   Device (Oxygen Therapy): room air  Body mass index is 35.91 kg/m².    Physical Exam   Constitutional: He is oriented to person, place, and time. He appears well-developed and well-nourished. No distress.   HENT:   Head: Normocephalic and atraumatic.   Mouth/Throat: Oropharynx is clear and moist.   Eyes: Conjunctivae are normal. No scleral icterus.   Neck: Normal range of motion. Neck supple.   Cardiovascular: Normal rate, regular rhythm and normal heart sounds.   No murmur heard.  Pulmonary/Chest: Effort normal and breath sounds normal. No respiratory distress.   Abdominal: Soft. Bowel sounds are normal. There is no tenderness.   Genitourinary:   Genitourinary Comments: Deferred    Musculoskeletal: He exhibits no edema or deformity.   Neurological: He is alert and oriented to person, place, and time. No cranial nerve deficit.   4/5 strength in LUE and LLE   Skin: Skin is warm and dry. He is not diaphoretic.   Psychiatric: He has a normal mood and affect. His behavior is normal. Thought content normal.   Nursing note and vitals reviewed.      Results Review:       I reviewed the patient's new clinical results.  Results from last 7 days   Lab Units 19  0433 19  1331   WBC 10*3/mm3 7.37 8.10   HEMOGLOBIN g/dL 10.2* 11.7*    PLATELETS 10*3/mm3 156 168     Results from last 7 days   Lab Units 05/05/19  0559 05/04/19  0433 05/03/19  1404   SODIUM mmol/L 144 144 145   POTASSIUM mmol/L 3.9 3.7 4.8   CHLORIDE mmol/L 111* 112* 111*   CO2 mmol/L 21.6* 22.1 22.8   BUN mg/dL 21* 23* 26*   CREATININE mg/dL 1.84* 2.05* 2.00*   GLUCOSE mg/dL 92 86 131*   Estimated Creatinine Clearance: 48.1 mL/min (A) (by C-G formula based on SCr of 1.84 mg/dL (H)).  Results from last 7 days   Lab Units 05/04/19  0433   ALBUMIN g/dL 3.20*   BILIRUBIN mg/dL 0.2   ALK PHOS U/L 94   AST (SGOT) U/L 17   ALT (SGPT) U/L 16     Results from last 7 days   Lab Units 05/05/19  0559 05/04/19  0433 05/03/19  1404   CALCIUM mg/dL 8.4* 8.3* 8.8   ALBUMIN g/dL  --  3.20*  --          Coag     HbA1C   Lab Results   Component Value Date    HGBA1C 7.10 (H) 05/04/2019    HGBA1C 7.40 (H) 01/31/2019    HGBA1C 7.21 (H) 10/30/2018     Infection     Radiology(recent) Mri Angiogram Head Without Contrast    Result Date: 5/3/2019  1. There is a 9 mm area of restricted diffusion in the right medulla at its junction with the jordin that is consistent with an area of acute ischemia. 2. Normal MRA of the head and neck without contrast.  NASCET criteria were used in this interpretation. 3. There is stable encephalomalacia in the right frontal and parietal lobes in the area of prior infarction associated with a prior craniotomy.  These findings were communicated to the stroke physician on-call, Dr. Winkler, on 05/03/2019 at 8:55 PM.  This report was finalized on 5/3/2019 9:01 PM by Dr. Srini Mckeon M.D.      Mri Angiogram Neck Without Contrast    Result Date: 5/3/2019  1. There is a 9 mm area of restricted diffusion in the right medulla at its junction with the jordin that is consistent with an area of acute ischemia. 2. Normal MRA of the head and neck without contrast.  NASCET criteria were used in this interpretation. 3. There is stable encephalomalacia in the right frontal and parietal lobes  in the area of prior infarction associated with a prior craniotomy.  These findings were communicated to the stroke physician on-call, Dr. Winkler, on 05/03/2019 at 8:55 PM.  This report was finalized on 5/3/2019 9:01 PM by Dr. Srini Mckeon M.D.      Mri Brain Without Contrast    Result Date: 5/3/2019  1. There is a 9 mm area of restricted diffusion in the right medulla at its junction with the jordin that is consistent with an area of acute ischemia. 2. Normal MRA of the head and neck without contrast.  NASCET criteria were used in this interpretation. 3. There is stable encephalomalacia in the right frontal and parietal lobes in the area of prior infarction associated with a prior craniotomy.  These findings were communicated to the stroke physician on-call, Dr. Winkler, on 05/03/2019 at 8:55 PM.  This report was finalized on 5/3/2019 9:01 PM by Dr. Srini Mckeon M.D.      No results found for: TROPONINT, TROPONINI, BNP  No components found for: TSH;2      amitriptyline 25 mg Oral Nightly   aspirin 81 mg Oral Daily   atorvastatin 80 mg Oral Daily   carvedilol 25 mg Oral BID With Meals   cholecalciferol 1,000 Units Oral BID   insulin glargine 20 Units Subcutaneous QAM   insulin glargine 38 Units Subcutaneous Nightly   insulin lispro 0-14 Units Subcutaneous 4x Daily With Meals & Nightly   insulin lispro 10 Units Subcutaneous TID With Meals   levETIRAcetam 500 mg Oral Q12H   levothyroxine 50 mcg Oral Daily   linagliptin 5 mg Oral Daily   pantoprazole 40 mg Oral QAM   sodium chloride 3 mL Intravenous Q12H   ticagrelor 60 mg Oral BID       sodium chloride 50 mL/hr Last Rate: 50 mL/hr (05/04/19 1430)   Diet Regular; Cardiac, Consistent Carbohydrate, Renal      Assessment/Plan      Active Hospital Problems    Diagnosis  POA   • **Acute ischemic stroke (CMS/HCC) [I63.9]  Yes   • Left leg weakness [R29.898]  Yes   • HTN (hypertension) [I10]  Yes   • GERD without esophagitis [K21.9]  Yes   • History of pontine stroke  [Z86.73]  Not Applicable   • CKD (chronic kidney disease) stage 3, GFR 30-59 ml/min (CMS/[facility]) [N18.3]  Yes   • History of meningioma of the brain [Z86.011]  Not Applicable   • Subclinical hypothyroidism [E03.9]  Yes   • Type 2 diabetes mellitus with complication, with long-term current use of insulin (CMS/[facility]) [E11.8, Z79.4]  Not Applicable   • Vitamin D deficiency [E55.9]  Yes   • Hyperlipidemia [E78.5]  Yes      Resolved Hospital Problems   No resolved problems to display.       · ASA and Brillinta per Neurology  · Feels weakness is worse today, will get STAT CT to exclude bleed  · Monitor for arrhythmia on telemetery  · On insulin, monitor BG, decreased insulin a little with relatively low BG  · Monitor renal fx, on gentle fluids  · Permissive HTN, may need to adjust BP meds here and at discharge  · PT/OT/ST    Will need likely acute rehab    DW family  Reviewed previous records      Ramses Carrasco MD  [facility]  05/05/19  10:50 AM

## 2019-05-05 NOTE — PLAN OF CARE
Problem: Patient Care Overview  Goal: Plan of Care Review  Outcome: Ongoing (interventions implemented as appropriate)   05/05/19 8049   Coping/Psychosocial   Plan of Care Reviewed With patient   OTHER   Outcome Summary Patient is appropriate for skilled PT secondary to decreased strength on the left side, impaired gait, and unable to negotiate stairs. Lives with elderly parents who can not help at this time. Recommend Rehab once D/C'ed from hospital to help patient and decrease load/stress on family. Will continue to treat patient while in this facility.

## 2019-05-05 NOTE — PLAN OF CARE
Problem: Patient Care Overview  Goal: Plan of Care Review  Outcome: Ongoing (interventions implemented as appropriate)   05/05/19 0452   Coping/Psychosocial   Plan of Care Reviewed With patient   Plan of Care Review   Progress improving   OTHER   Outcome Summary NIH 2 this shift. Improved strength noted in LLE. Ambulating better with stand by assist to bathroom. VSS. Continue to monitor.        Problem: Fall Risk (Adult)  Goal: Identify Related Risk Factors and Signs and Symptoms  Outcome: Ongoing (interventions implemented as appropriate)    Goal: Absence of Fall  Outcome: Ongoing (interventions implemented as appropriate)      Problem: Stroke (Ischemic) (Adult)  Goal: Signs and Symptoms of Listed Potential Problems Will be Absent, Minimized or Managed (Stroke)  Outcome: Ongoing (interventions implemented as appropriate)

## 2019-05-05 NOTE — NURSING NOTE
RN noted that neuro had signed off per note on 5/4. RN called Enoch BUTLER to notify him that patient had worsening left sided weakness today. RN reviewed CT from today with Enoch BUTLER.  MD stated that RN should increase fluids to 75ml/hr, and recheck BP since it was elevated tonight but goal systolic BP will be below 180 at this time. Patient should stay in bed tonight and neuro MD will see patient in the morning again.

## 2019-05-06 ENCOUNTER — APPOINTMENT (OUTPATIENT)
Dept: GENERAL RADIOLOGY | Facility: HOSPITAL | Age: 59
End: 2019-05-06

## 2019-05-06 ENCOUNTER — APPOINTMENT (OUTPATIENT)
Dept: MRI IMAGING | Facility: HOSPITAL | Age: 59
End: 2019-05-06

## 2019-05-06 LAB
GLUCOSE BLDC GLUCOMTR-MCNC: 137 MG/DL (ref 70–130)
GLUCOSE BLDC GLUCOMTR-MCNC: 138 MG/DL (ref 70–130)
GLUCOSE BLDC GLUCOMTR-MCNC: 142 MG/DL (ref 70–130)
GLUCOSE BLDC GLUCOMTR-MCNC: 188 MG/DL (ref 70–130)
GLUCOSE BLDC GLUCOMTR-MCNC: 199 MG/DL (ref 70–130)

## 2019-05-06 PROCEDURE — 63710000001 INSULIN GLARGINE PER 5 UNITS: Performed by: INTERNAL MEDICINE

## 2019-05-06 PROCEDURE — 92611 MOTION FLUOROSCOPY/SWALLOW: CPT | Performed by: SPEECH-LANGUAGE PATHOLOGIST

## 2019-05-06 PROCEDURE — 97535 SELF CARE MNGMENT TRAINING: CPT

## 2019-05-06 PROCEDURE — 99233 SBSQ HOSP IP/OBS HIGH 50: CPT | Performed by: NURSE PRACTITIONER

## 2019-05-06 PROCEDURE — 97165 OT EVAL LOW COMPLEX 30 MIN: CPT

## 2019-05-06 PROCEDURE — 63710000001 INSULIN LISPRO (HUMAN) PER 5 UNITS: Performed by: HOSPITALIST

## 2019-05-06 PROCEDURE — 82962 GLUCOSE BLOOD TEST: CPT

## 2019-05-06 PROCEDURE — 70551 MRI BRAIN STEM W/O DYE: CPT

## 2019-05-06 PROCEDURE — 63710000001 INSULIN LISPRO (HUMAN) PER 5 UNITS: Performed by: INTERNAL MEDICINE

## 2019-05-06 PROCEDURE — 74230 X-RAY XM SWLNG FUNCJ C+: CPT

## 2019-05-06 RX ORDER — FOLIC ACID 1 MG/1
1 TABLET ORAL DAILY
Status: DISCONTINUED | OUTPATIENT
Start: 2019-05-06 | End: 2019-05-10 | Stop reason: HOSPADM

## 2019-05-06 RX ORDER — INSULIN GLARGINE 100 [IU]/ML
15 INJECTION, SOLUTION SUBCUTANEOUS EVERY MORNING
Status: DISCONTINUED | OUTPATIENT
Start: 2019-05-07 | End: 2019-05-07

## 2019-05-06 RX ADMIN — TICAGRELOR 60 MG: 60 TABLET ORAL at 20:54

## 2019-05-06 RX ADMIN — CARVEDILOL 25 MG: 25 TABLET, FILM COATED ORAL at 07:47

## 2019-05-06 RX ADMIN — ASPIRIN 81 MG: 81 TABLET, CHEWABLE ORAL at 08:49

## 2019-05-06 RX ADMIN — ATORVASTATIN CALCIUM 80 MG: 80 TABLET, FILM COATED ORAL at 08:49

## 2019-05-06 RX ADMIN — INSULIN LISPRO 7 UNITS: 100 INJECTION, SOLUTION INTRAVENOUS; SUBCUTANEOUS at 17:16

## 2019-05-06 RX ADMIN — SODIUM CHLORIDE, PRESERVATIVE FREE 3 ML: 5 INJECTION INTRAVENOUS at 08:49

## 2019-05-06 RX ADMIN — LEVOTHYROXINE SODIUM 50 MCG: 50 TABLET ORAL at 08:49

## 2019-05-06 RX ADMIN — LEVETIRACETAM 500 MG: 500 TABLET, FILM COATED ORAL at 08:49

## 2019-05-06 RX ADMIN — INSULIN GLARGINE 18 UNITS: 100 INJECTION, SOLUTION SUBCUTANEOUS at 07:47

## 2019-05-06 RX ADMIN — VITAMIN D, TAB 1000IU (100/BT) 1000 UNITS: 25 TAB at 20:55

## 2019-05-06 RX ADMIN — SODIUM CHLORIDE 75 ML/HR: 9 INJECTION, SOLUTION INTRAVENOUS at 17:20

## 2019-05-06 RX ADMIN — SODIUM CHLORIDE, PRESERVATIVE FREE 3 ML: 5 INJECTION INTRAVENOUS at 20:55

## 2019-05-06 RX ADMIN — FOLIC ACID 1 MG: 1 TABLET ORAL at 23:05

## 2019-05-06 RX ADMIN — TICAGRELOR 120 MG: 60 TABLET ORAL at 23:06

## 2019-05-06 RX ADMIN — VITAMIN D, TAB 1000IU (100/BT) 1000 UNITS: 25 TAB at 08:49

## 2019-05-06 RX ADMIN — TICAGRELOR 60 MG: 60 TABLET ORAL at 08:48

## 2019-05-06 RX ADMIN — SODIUM CHLORIDE 500 ML: 9 INJECTION, SOLUTION INTRAVENOUS at 18:15

## 2019-05-06 RX ADMIN — LINAGLIPTIN 5 MG: 5 TABLET, FILM COATED ORAL at 08:49

## 2019-05-06 RX ADMIN — BARIUM SULFATE 65 ML: 960 POWDER, FOR SUSPENSION ORAL at 14:32

## 2019-05-06 RX ADMIN — LEVETIRACETAM 500 MG: 500 TABLET, FILM COATED ORAL at 20:55

## 2019-05-06 RX ADMIN — CARVEDILOL 25 MG: 25 TABLET, FILM COATED ORAL at 17:16

## 2019-05-06 RX ADMIN — AMITRIPTYLINE HYDROCHLORIDE 25 MG: 25 TABLET, FILM COATED ORAL at 20:55

## 2019-05-06 RX ADMIN — INSULIN LISPRO 7 UNITS: 100 INJECTION, SOLUTION INTRAVENOUS; SUBCUTANEOUS at 07:47

## 2019-05-06 RX ADMIN — INSULIN LISPRO 7 UNITS: 100 INJECTION, SOLUTION INTRAVENOUS; SUBCUTANEOUS at 11:27

## 2019-05-06 RX ADMIN — SODIUM CHLORIDE 75 ML/HR: 9 INJECTION, SOLUTION INTRAVENOUS at 04:13

## 2019-05-06 RX ADMIN — AMLODIPINE BESYLATE 10 MG: 10 TABLET ORAL at 08:48

## 2019-05-06 RX ADMIN — INSULIN LISPRO 3 UNITS: 100 INJECTION, SOLUTION INTRAVENOUS; SUBCUTANEOUS at 20:54

## 2019-05-06 RX ADMIN — BARIUM SULFATE 4 ML: 980 POWDER, FOR SUSPENSION ORAL at 14:32

## 2019-05-06 RX ADMIN — PANTOPRAZOLE SODIUM 40 MG: 40 TABLET, DELAYED RELEASE ORAL at 06:19

## 2019-05-06 NOTE — THERAPY EVALUATION
Acute Care - Speech Language Pathology   Swallow Initial Evaluation [facility]     Patient Name: Rocky Logan Jr.  : 1960  MRN: [id number]  Today's Date: 2019  Onset of Illness/Injury or Date of Surgery: 19     Referring Physician: Dr Li      Admit Date: 5/3/2019    Visit Dx:     ICD-10-CM ICD-9-CM   1. Left leg weakness R29.898 729.89   2. Right pontine stroke (CMS/HCC) I63.50 434.91   3. Oropharyngeal dysphagia R13.12 787.22   4. Weakness generalized R53.1 780.79     Patient Active Problem List   Diagnosis   • Stroke (CMS/HCC)   • Type 2 diabetes mellitus with complication, with long-term current use of insulin (CMS/HCC)   • Hyperlipidemia   • Elevated TSH   • Vitamin D deficiency   • Microalbuminuria   • Subclinical hypothyroidism   • Left leg weakness   • HTN (hypertension)   • GERD without esophagitis   • History of pontine stroke   • CKD (chronic kidney disease) stage 3, GFR 30-59 ml/min (CMS/HCC)   • History of meningioma of the brain   • Acute ischemic stroke (CMS/HCC)     Past Medical History:   Diagnosis Date   • Diabetes mellitus (CMS/HCC)    • Hypertension    • Stroke (CMS/HCC)      Past Surgical History:   Procedure Laterality Date   • CARDIAC SURGERY     • HERNIA REPAIR          SWALLOW EVALUATION (last 72 hours)      SLP Adult Swallow Evaluation     Row Name 19 1600 19 1300                Rehab Evaluation    Document Type  evaluation  -SA  evaluation  -LS       Subjective Information  complains of;weakness  -SA  --       Patient Observations  alert;cooperative  -SA  alert;cooperative;agree to therapy  -LS       Patient Effort  good  -SA  good  -LS       Symptoms Noted During/After Treatment  none  -SA  --          General Information    Patient Profile Reviewed  --  yes  -LS       Pertinent History Of Current Problem  --  Pt is a 58 y.o. m. who presented to ED c/o L leg and arm  weakness 19 which started at 11;30 . Pt states symptoms became worse the morning of  5/3/19 while eating breakfast. He states he was unable to feed himself. Pt reports prior hx of stroke 2 years ago.  -LS       Current Method of Nutrition  regular textures;thin liquids  -SA  regular textures;thin liquids  -LS       Precautions/Limitations, Vision  WFL  -SA  WFL  -LS       Precautions/Limitations, Hearing  WFL  -SA  WFL  -LS       Prior Level of Function-Communication  WFL  -SA  WFL  -LS       Prior Level of Function-Swallowing  no diet consistency restrictions  -SA  no diet consistency restrictions  -LS       Plans/Goals Discussed with  patient  -SA  patient;family;agreed upon  -LS       Barriers to Rehab  none identified  -SA  none identified  -LS       Patient's Goals for Discharge  patient did not state  -SA  patient did not state  -LS          Pain Assessment    Additional Documentation  Pain Scale: Numbers Pre/Post-Treatment (Group)  -SA  --          Pain Scale: Numbers Pre/Post-Treatment    Pain Scale: Numbers, Pretreatment  0/10 - no pain  -SA  --       Pain Scale: Numbers, Post-Treatment  0/10 - no pain  -SA  --          Oral Motor and Function    Dentition Assessment  --  natural, present and adequate  -LS       Secretion Management  --  WNL/WFL  -LS       Mucosal Quality  --  moist, healthy  -LS          General Eating/Swallowing Observations    Respiratory Support Currently in Use  --  room air  -LS       Eating/Swallowing Skills  --  fed by SLP  -LS       Positioning During Eating  --  upright 90 degree;upright in bed  -LS       Utensils Used  --  spoon;cup  -LS       Consistencies Trialed  --  regular textures;thin liquids  -LS          Clinical Swallow Eval    Oral Prep Phase  --  WFL  -LS       Oral Transit  --  WFL  -LS       Oral Residue  --  WFL  -LS       Pharyngeal Phase  --  no overt signs/symptoms of pharyngeal impairment  -LS          MBS/VFSS    Utensils Used  spoon;straw;cup  -SA  --       Consistencies Trialed  regular textures;soft textures;pureed;thin liquids  -SA  --           MBS/VFSS Interpretation    Oral Prep Phase  impaired oral phase of swallowing  -SA  --       Oral Transit Phase  impaired  -SA  --       Oral Residue  WFL  -SA  --          Oral Preparatory Phase    Oral Preparatory Phase  other (see comments) reduced bolus formation  -SA  --          Oral Transit Phase    Impaired Oral Transit Phase  premature spillage of liquids into pharynx  -SA  --       Premature Spillage of Liquids into Pharynx  mixed consistency  -SA  --          Initiation of Pharyngeal Swallow    Pharyngeal Phase  impaired pharyngeal phase of swallowing  -SA  --       Penetration During the Swallow  thin liquids;other (see comments) x1 by straw as liquid wash  -SA  --       Response to Penetration  no response  -SA  --       Pharyngeal Residue  pudding/puree;regular textures;valleculae;other (see comments) mild-mod  -SA  --       Response to Residue  unable to clear residue  -SA  --       Attempted Compensatory Maneuvers  additional subsequent swallow  -SA  --       Response to Attempted Compensatory Maneuvers  reduced residue  -SA  --          Esophageal Phase    Esophageal Phase  esophageal retention with retrograde flow below PES;esophageal retention  -SA  --          SLP Communication to Radiology    Summary Statement  Pt demonstrates a minimal oropharyngeal dysphagia characterized by mistiming and reduced bolus formation.  Penetration noted x1 with thin by straw as liquid wash.  Esophageal function characterized by incomplete clearance and intraesophageal backflow.    -SA  --          Clinical Impression    SLP Swallowing Diagnosis  mild  -SA  --       Functional Impact  risk of aspiration/pneumonia  -SA  --       Rehab Potential/Prognosis, Swallowing  good, to achieve stated therapy goals  -SA  --       Swallow Criteria for Skilled Therapeutic Interventions Met  demonstrates skilled criteria  -SA  --          Recommendations    Therapy Frequency (Swallow)  PRN  -SA  PRN  -LS       Predicted  Duration Therapy Intervention (Days)  until discharge  -  until discharge  -       SLP Diet Recommendation  regular textures;thin liquids;chopped  -  regular textures;thin liquids  -       Recommended Diagnostics  VFSS (MBS)  -  VFSS (MBS) Pt states cough w/eggs and sister noted cough with lunch.  -       Recommended Precautions and Strategies  upright posture during/after eating;small bites of food and sips of liquid  -  upright posture during/after eating;small bites of food and sips of liquid  -       SLP Rec. for Method of Medication Administration  meds whole;with pudding or applesauce  -SA  meds whole;with thin liquids;with pudding or applesauce  -LS       Monitor for Signs of Aspiration  notify SLP if any concerns  -  yes;notify SLP if any concerns  -       Anticipated Dischage Disposition  unknown  -  --          Swallow Goals (SLP)    Oral Nutrition/Hydration Goal Selection (SLP)  --  oral nutrition/hydration, SLP goal 1  -          Oral Nutrition/Hydration Goal 1 (SLP)    Oral Nutrition/Hydration Goal 1, SLP  Pt will tolerate least restrictive diet  -  --         User Key  (r) = Recorded By, (t) = Taken By, (c) = Cosigned By    Initials Name Effective Dates     Patsy Higgins, MS CCC-SLP 03/07/18 -      Deanna Martin, MS CCC-SLP 06/08/18 -           EDUCATION  The patient has been educated in the following areas:   Dysphagia (Swallowing Impairment) Modified Diet Instruction.    SLP Recommendation and Plan  SLP Swallowing Diagnosis: mild  SLP Diet Recommendation: regular textures, thin liquids, chopped  Recommended Precautions and Strategies: upright posture during/after eating, small bites of food and sips of liquid     Monitor for Signs of Aspiration: notify SLP if any concerns  Recommended Diagnostics: VFSS (MBS)  Swallow Criteria for Skilled Therapeutic Interventions Met: demonstrates skilled criteria  Anticipated Dischage Disposition: unknown  Rehab Potential/Prognosis,  Swallowing: good, to achieve stated therapy goals  Therapy Frequency (Swallow): PRN  Predicted Duration Therapy Intervention (Days): until discharge       Plan of Care Reviewed With: patient  Plan of Care Review  Plan of Care Reviewed With: patient  Outcome Summary: Pt demonstrates a minimal oropharyngeal dysphagia characterized by mistiming and reduced bolus formation.  Penetration noted x1 with thin by straw as liquid wash.  Esophageal function characterized by incomplete clearance and intraesophageal backflow.  REC regular diet, soft chopped meats, thin liquds, meds whole w/ puree    SLP GOALS     Row Name 05/06/19 1600             Oral Nutrition/Hydration Goal 1 (SLP)    Oral Nutrition/Hydration Goal 1, SLP  Pt will tolerate least restrictive diet  -        User Key  (r) = Recorded By, (t) = Taken By, (c) = Cosigned By    Initials Name Provider Type    Patsy Mcmanus MS CCC-SLP Speech and Language Pathologist           SLP Outcome Measures (last 72 hours)      SLP Outcome Measures     Row Name 05/06/19 1600 05/04/19 1300          SLP Outcome Measures    Outcome Measure Used?  Adult NOMS  -SA  Adult NOMS  -LS        Adult FCM Scores    FCM Chosen  --  Swallowing  -LS     Swallowing FCM Score  5  -SA  7  -LS       User Key  (r) = Recorded By, (t) = Taken By, (c) = Cosigned By    Initials Name Effective Dates    Patsy Mcmanus MS CCC-SLP 03/07/18 -     Deanna Troy MS CCC-SLP 06/08/18 -            Time Calculation:   Time Calculation- SLP     Row Name 05/06/19 1645             Time Calculation- SLP    SLP Start Time  1400  -      SLP Stop Time  1500  -      SLP Time Calculation (min)  60 min  -      SLP Received On  05/06/19  -        User Key  (r) = Recorded By, (t) = Taken By, (c) = Cosigned By    Initials Name Provider Type    Patsy Mcmanus MS CCC-SLP Speech and Language Pathologist          Therapy Charges for Today     Code Description Service Date Service Provider Modifiers  Qty    45370174006 HC ST MOTION FLUORO EVAL SWALLOW 4 5/6/2019 Patsy Higgins, MS CCC-SLP GN 1               Patsy Higgins MS CCC-SLP  5/6/2019

## 2019-05-06 NOTE — PLAN OF CARE
Problem: Patient Care Overview  Goal: Plan of Care Review  Outcome: Ongoing (interventions implemented as appropriate)   05/06/19 1649   Coping/Psychosocial   Plan of Care Reviewed With patient   OTHER   Outcome Summary Pt demonstrates a minimal oropharyngeal dysphagia characterized by mistiming and reduced bolus formation. Penetration noted x1 with thin by straw as liquid wash. Esophageal function characterized by incomplete clearance and intraesophageal backflow. REC regular diet, soft chopped meats, thin liquds, meds whole w/ puree

## 2019-05-06 NOTE — SIGNIFICANT NOTE
05/06/19 [facility]   Rehab Treatment   Discipline physical therapy assistant   Reason Treatment Not Performed unavailable for treatment  (per RN Nae, pt is on bedrest until neuro sees pt.  Will f/u with pt tomorrow. )   Recommendation   PT - Next Appointment 05/07/19

## 2019-05-06 NOTE — PROGRESS NOTES
Continued Stay Note  [facility]     Patient Name: Rocky Logan Jr.  MRN: [id number]  Today's Date: 5/6/2019    Admit Date: 5/3/2019    Discharge Plan     Row Name 05/06/19 0947       Plan    Plan Comments  Spoke with [facility] for referral per Dr. aCrrasco, they will evaluate. destinee NUNEZ/CCP        Discharge Codes    No documentation.             Karla Lazo, RN

## 2019-05-06 NOTE — PROGRESS NOTES
"DOS: 2019  NAME: Rocky Logan Jr.   : 1960  PCP: Karen Martinez APRN  Chief Complaint   Patient presents with   • Extremity Weakness     Stroke  Patient seen in follow-up today; new to me        Subjective: Yesterday w/ some fluctuating/worsening left-sided weakness therefore IVs and bedrest recommended. Today motor exam significantly worse than when last seen by our team . STAT CT head from yesterday unremarkable; no new acute findings noted.     Objective:  Vital signs: BP (!) 186/97 (BP Location: Right arm, Patient Position: Lying)   Pulse 69   Temp 97.9 °F (36.6 °C) (Oral)   Resp 16   Ht 167.6 cm (66\")   Wt 102 kg (225 lb 1.6 oz)   SpO2 96%   BMI 36.35 kg/m²       HEENT: Normocephalic, atraumatic   COR: RRR  Resp: Even and unlabored  Extremities: Equal pulses, non distal embolization    Neurological:   MS: AO. Language normal. No neglect. Higher integrative function normal  CN: II-XII normal except decreased left shoulder shrug  Motor: 5/5, normal tone on right 1/5 on left   Reflexes: Toes downgoing   Sensory: Intact  Coordination: Normal on right     Laboratory results:  Lab Results   Component Value Date    GLUCOSE 92 2019    CALCIUM 8.4 (L) 2019     2019    K 3.9 2019    CO2 21.6 (L) 2019     (H) 2019    BUN 21 (H) 2019    CREATININE 1.84 (H) 2019    EGFRIFAFRI 42 (L) 2019    EGFRIFNONA 38 (L) 2019    BCR 11.4 2019    ANIONGAP 11.4 2019     Lab Results   Component Value Date    WBC 7.37 2019    HGB 10.2 (L) 2019    HCT 29.8 (L) 2019    MCV 86.1 2019     2019     Lab Results   Component Value Date    CHOL 175 2019     Lab Results   Component Value Date    HDL 25 (L) 2019    HDL 35 (L) 10/30/2018    HDL 45 2018     Lab Results   Component Value Date     (H) 2019     (H) 10/30/2018     (H) 2018     Lab Results "   Component Value Date    TRIG 211 (H) 05/04/2019    TRIG 133 10/30/2018    TRIG 78 07/24/2018     Lab Results   Component Value Date    TSH 3.700 10/30/2018     Lab Results   Component Value Date    SJEENOGQ39 697 10/30/2018     Lab Results   Component Value Date    HGBA1C 7.10 (H) 05/04/2019     Lab Results   Component Value Date    CHOL 175 05/04/2019    CHLPL 179 10/30/2018    CHLPL 214 (H) 07/24/2018    CHLPL 201 (H) 01/15/2018     Lab Results   Component Value Date    TRIG 211 (H) 05/04/2019    TRIG 133 10/30/2018    TRIG 78 07/24/2018     Lab Results   Component Value Date    HDL 25 (L) 05/04/2019    HDL 35 (L) 10/30/2018    HDL 45 07/24/2018     Lab Results   Component Value Date     (H) 05/04/2019     (H) 10/30/2018     (H) 07/24/2018       Review and interpretation of imaging:  Interpretation Summary     · Left ventricular systolic function is normal. Calculated EF = 64.0%. Estimated EF was in agreement with the calculated EF. Normal left ventricular cavity size noted. All left ventricular wall segments contract normally.  · Left ventricular wall thickness is consistent with severe concentric hypertrophy.  · Left ventricular diastolic dysfunction is noted (grade II w/high LAP) consistent with pseudonormalization.  · There is mild calcification of the aortic valve.  · The mitral valve is abnormal in structure. MAC is present.  · No evidence of a patent foramen ovale. Saline test results are negative.        Examination: MRI of the brain without contrast and MRA of the neck  without contrast and MRA of the head without contrast     HISTORY: 58-year-old male with history of left-sided weakness and  decreased renal function. The patient is status post craniotomy in 2009  for meningioma.     TECHNIQUE: Multiplanar and multisequence images of the brain were  obtained without intravenous contrast. Time-of-flight noncontrast  angiographic images of the intracranial and cervical arterial  structures  were obtained.     COMPARISON: MRI of the brain without contrast, 05/17/2017     FINDINGS: Within the right aspect of the medulla at its junction with  the jordin there is a 0.9 cm focus of restricted diffusion consistent with  an area of acute ischemia. No other areas of acute diffusion are  appreciated. There is a stable area of encephalomalacia within the right  frontal, parietal and temporal lobes with ex vacuo dilatation of the  right lateral ventricle. There is a stable 3.6 x 3.0 cm intraventricular  fluid containing mass associated with the patient's prior craniotomy.  Areas of encephalomalacia within the right cerebral hemisphere  periventricular white matter are noted. Postsurgical craniotomy changes  of the right frontal and parietal bones are noted.     FINDINGS of the MRA of the neck: There is normal flow-related  enhancement within the bilateral vertebral, common carotid and cervical  internal carotid arteries without evidence for stenosis, vascular  malformation or aneurysm.     FINDINGS of the MRI of the head: There is normal flow-related  enhancement within the basilar artery, posterior cerebral arteries,  anterior cerebral arteries and middle cerebral arteries without evidence  for aneurysm or stenosis.     IMPRESSION:  1. There is a 9 mm area of restricted diffusion in the right medulla at  its junction with the jordin that is consistent with an area of acute  ischemia.  2. Normal MRA of the head and neck without contrast.  NASCET criteria  were used in this interpretation.  3. There is stable encephalomalacia in the right frontal and parietal  lobes in the area of prior infarction associated with a prior  craniotomy.     These findings were communicated to the stroke physician on-call, Dr. Winkler, on 05/03/2019 at 8:55 PM.     This report was finalized on 5/3/2019 9:01 PM by Dr. Srini Mckeon M.D.  CT OF THE HEAD WITHOUT CONTRAST     HISTORY: 58-year-old male with a history of a prior  stroke with  worsening left-sided weakness.     TECHNIQUE: Contiguous axial images were obtained through the head  without IV contrast.     COMPARISON: CT of the head without contrast, 05/03/2019.     FINDINGS: There is stable encephalomalacia within the right frontal,  temporal and parietal lobes. No intracranial hemorrhage is appreciated.  There is no midline shift. There is a stable cystic region and/or fluid  collection within the frontal horn of the right lateral ventricle. No  territorial edema is appreciated. The visualized paranasal sinuses and  mastoid air cells are clear.     IMPRESSION:  There is no evidence for acute intracranial abnormality or  for interval change when compared to the prior head CT dated 05/03/2019.  Stable evidence of a prior infarct involving the right frontal lobe and  temporal lobe is noted. Evidence of a prior right craniotomy is also  noted.     Radiation dose reduction techniques were utilized, including automated  exposure control and exposure modulation based on body size.     This report was finalized on 5/5/2019 8:01 PM by Dr. Srini Mckeon M.D.        EMERGENCY NONCONTRAST HEAD CT 05/03/2019     CLINICAL HISTORY: Left-sided weakness, prior pontine stroke, meningioma  resection 10 years ago.     TECHNIQUE: Spiral CT images were obtained from the base of the skull to  the vertex without intravenous contrast. Images were reformatted and  submitted in 3 mm thick axial CT section with brain algorithm and 2 mm  thick sagittal and coronal reconstructions were performed.     This is correlated with a prior MRI of brain from [facility] 05/17/2017.     FINDINGS: Patient has had a large prior 10 x 8 cm lateral right  frontotemporal parietal-pterional craniotomy for resection of a right  middle cranial fossa meningioma 10 years ago. There is a moderate to  large area of encephalomalacia involving the inferior lateral right  frontal lobe, anterior and superior right  temporal lobe.  The area of  encephalomalacia measuring 8 x 5 x 5.8 cm is unchanged. There is a  lentiform dural based density in the anterior inferior aspect of the  right middle cranial fossa measuring 14 x 9 mm medial lateral and  anterior posterior dimension, unchanged since prior MRI 05/17/2017; it  is nonspecific.  It could be an area of scar or residual tumor. The  previously identified 10 x 5 mm right posterior pontine infarct back on  05/17/2017 is unappreciable on the current head CT. There is some mild  low density in the periventricular white matter consistent with mild  small vessel disease.  The ventricles are normal in size.  I see no  midline shift.  No extra-axial fluid collections are identified and  there is no evidence of acute intracranial hemorrhage.  There are coarse  calcified atherosclerotic plaques involving the intracranial segments of  the distal vertebral artery, and extensive calcified plaques involving  the cavernous and supracavernous segments of the internal carotid  arteries bilaterally. There is minimal posterior left maxillary sinus  mucosal thickening.  The remainder of the paranasal sinuses, mastoid air  cells and middle ear cavities are clear.     IMPRESSION:  1. Given the difference in modality, no significant change when compared  to prior MRI of the head from [facility] 05/17/2017 with  no acute abnormality seen on this head CT.  2. There has been a previous 10 x 8 cm lateral right frontotemporal  parietal-pterional craniotomy for resection of a right sphenoid  wing-middle cranial fossa meningioma. There is a large area of  encephalomalacia involving the inferior lateral right frontal lobe and  the anterior and superior right temporal lobe with the area of  encephalomalacia measuring 8 x 5 x 5.8 cm, unchanged.  There is a  lentiform shaped area of soft tissue density in the anterior inferior  medial aspect of the right middle cranial fossa measuring 14 x 9 mm  in  size, unchanged since MRI 05/17/2017, it could be scar.  A small focus  of residual or recurrent meningioma that is stable since 05/17/2017  cannot be excluded.  3. Mild small vessel disease in the cerebral white matter and there are  extensive calcified atherosclerotic plaques in the intracranial segments  of the distal vertebral arteries and cavernous and supracavernous  segments of the internal carotid arteries bilaterally.  The remainder of  the head CT is unremarkable. If there remains any clinical suspicion of  an acute stroke causing the patient's left-sided weakness, I recommend  an MRI of the brain for more complete assessment.  The results and  recommendations were communicated to Dr. Obando from the emergency room  by telephone 05/03/2019 at 12:30 PM.      Radiation dose reduction techniques were utilized, including automated  exposure control and exposure modulation based on body size.     This report was finalized on 5/3/2019 3:39 PM by Dr. Alvin Hutchison M.D.       Impression:This is a 59 yo male w/ DM, HTN (on Aggrenox), prior CVA (right pontine w/ mild left-sided residual) and prior crainotomy for meningioma resection who presented to [facility] on 05/03 w/ acute left-sided weakness causing him difficulty w/ ambulation. Since admission MRI Brain revealed new right pontine stroke. MRA Head and Neck w/o significant stenosis. ARNAV EF 64%. LV/LA normal. No mention of PFO. Aggrenox discontinued and Brilinta 60mg BID and ASA 81mg recommended along w/ high-dose statin. Our service sign-off 05/04; on 05/05 patient had worsening left-sided weakness therefore STAT Ct was ordered along w/ IVFs and bedrest. CT Head was negative for acute findings. We will plan to repeat MRI Brain to further evaluate CVA for possible new acute versus extension of existing acute CVA. I have discussed the above with patient and she verbalizes understanding and agrees to plan. PT/OT/ST. CCP to assist with discharge planning. Call RRT for  any acute neurological changes and/or concerns. We will continue to follow and advise.       Plan:  Continue Bedrest for now.   Continue IVFs   Complete repeat bedside swallow prior to giving any more PO   MRI Brain now   ASA 81mg daily (on Aggrenox PTA)   Lipitor 80mg daily  () (on PTA)   Neurochecks  BP control  Stroke Education  BUCK/SCDs  PT/OT/ST  Consider rehab placement at discharge     Csse reviewed with Dr. Fink and he agrees with plan above.     Joanna Charles APRN

## 2019-05-06 NOTE — PLAN OF CARE
Problem: Patient Care Overview  Goal: Plan of Care Review   05/06/19 0913   Coping/Psychosocial   Plan of Care Reviewed With patient   OTHER   Outcome Summary Very pleasant 59 yo gentleman with h/o right sided pontine CVA 2 years ago and right craniotomy for resection of benign meningioma 10 years ago, h/o DM2, HTN, HLD, presented to ER c/o sudden onset of left sided weakness. Pt presents to OT with LUE weakness, decreased functional mobility, resulting in decreased independence with ADLs. Pt currently unable to move LUE for elbow fl/ex, forearm, wrist, fingers. Pt states he was able to yesterday but his LUE is progressively getting worse over night. Pt lives with parents and they are not able to assist him upon d/c. Pt will benefit from skilled OT and will benefit from inpatient rehab prior to returning home.

## 2019-05-06 NOTE — PLAN OF CARE
Problem: Patient Care Overview  Goal: Plan of Care Review  Outcome: Ongoing (interventions implemented as appropriate)   05/06/19 7751   Coping/Psychosocial   Plan of Care Reviewed With patient   Plan of Care Review   Progress no change   OTHER   Outcome Summary Patient slept occasionally tonight. Patient had no complaints of pain. Patient continues to have left sided weakness and weakness is getting more pronounced this morning. RN noted that patient's NIHSS has only increased from 3 to 4 tonight, but patient's left side is weaker. RN spoke to Kendall MD tonight and MD aware of increased left sided weakness. MD will see in the morning. Vital signs remain stable. Goal BP at this time is <180. Continue to monitor closely, continue with current plan of care.       Problem: Fall Risk (Adult)  Goal: Identify Related Risk Factors and Signs and Symptoms  Outcome: Outcome(s) achieved Date Met: 05/06/19    Goal: Absence of Fall  Outcome: Ongoing (interventions implemented as appropriate)      Problem: Stroke (Ischemic) (Adult)  Goal: Signs and Symptoms of Listed Potential Problems Will be Absent, Minimized or Managed (Stroke)  Outcome: Ongoing (interventions implemented as appropriate)

## 2019-05-06 NOTE — PROGRESS NOTES
Discharge Planning Assessment  [facility]     Patient Name: Rocky Logan Jr.  MRN: [id number]  Today's Date: 5/6/2019    Admit Date: 5/3/2019    Discharge Needs Assessment     Row Name 05/06/19 1212       Living Environment    Lives With  parent(s)    Name(s) of Who Lives With Patient  Bonita IZQUIERDO    Current Living Arrangements  home/apartment/condo 2 STEPS TO ENTER HOME AND 15 TO GO TO 2ND FLOOR.    Primary Care Provided by  parent(s)    Provides Primary Care For  no one, unable/limited ability to care for self    Family Caregiver if Needed  parent(s)    Quality of Family Relationships  helpful;involved;supportive    Able to Return to Prior Arrangements  yes       Resource/Environmental Concerns    Resource/Environmental Concerns  home accessibility    Home Accessibility Concerns  stairs to enter home;stairs to access bedroom or bathroom    Transportation Concerns  car, none       Transition Planning    Patient/Family Anticipates Transition to  inpatient rehabilitation facility    Patient/Family Anticipated Services at Transition  rehabilitation services    Transportation Anticipated  family or friend will provide       Discharge Needs Assessment    Readmission Within the Last 30 Days  no previous admission in last 30 days    Concerns to be Addressed  care coordination/care conferences;decision making;discharge planning    Equipment Currently Used at Home  cane, straight;walker, rolling;shower chair;grab bar    Anticipated Changes Related to Illness  inability to care for self    Equipment Needed After Discharge  none    Outpatient/Agency/Support Group Needs  inpatient rehabilitation facility    Discharge Facility/Level of Care Needs  rehabilitation facility    Offered/Gave Vendor List  yes    Patient's Choice of Community Agency(s)  [facility]    Current Discharge Risk  chronically ill;cognitively impaired        Discharge Plan     Row Name 05/06/19 1216       Plan    Plan  Referral to [facility]  [facility]ute Rehab if accepted will need [facility] Pre-cert    Patient/Family in Agreement with Plan  yes    Plan Comments  Spoke with pt and parents Linda and Rocky Logan at bedside, introduced self and explained CCP role. Verified facesheet and confirmed local pharmacy is [facility]. Pt states he lives in a 2 story home with his parents, 2 steps to enter and 15 steps to 2nd floor. Pt was IADL with mobility prior to admission, but did have a cane, and walker at home. Pt has gone to [facility] and used Outpt therapy at [facility] in the past. Pt wants to go to [facility]. CCP explained made referral and await eval. Discussed pre-cert process. CCP contact info given. destinee NUNEZ/CCP    Row Name 05/06/19 1038       Plan    Plan  Referral to [facility]- will need [facility] Pre-cert    Patient/Family in Agreement with Plan  yes    Row Name 05/06/19 0947       Plan    Plan Comments  Spoke with Kyra/[facility] for referral per Dr. Carrasco, they will evaluate. destinee RN/CCP        Destination      No service coordination in this encounter.      Durable Medical Equipment      No service coordination in this encounter.      Dialysis/Infusion      No service coordination in this encounter.      Home Medical Care      No service coordination in this encounter.      Therapy      No service coordination in this encounter.      Community Resources      No service coordination in this encounter.          Demographic Summary     Row Name 05/06/19 1211       General Information    Admission Type  inpatient    Required Notices Provided  Important Message from Medicare    Referral Source  admission list    Reason for Consult  discharge planning    Preferred Language  English     Used During This Interaction  no       Contact Information    Permission Granted to Share Info With  facility ;family/designee        Functional Status     Row Name 05/06/19 1211        Functional Status    Usual Activity Tolerance  good    Current Activity Tolerance  moderate       Functional Status, IADL    Medications  independent    Meal Preparation  independent    Housekeeping  independent    Laundry  independent    Shopping  independent       Mental Status    General Appearance WDL  WDL       Mental Status Summary    Recent Changes in Mental Status/Cognitive Functioning  no changes        Psychosocial    No documentation.       Abuse/Neglect    No documentation.       Legal    No documentation.       Substance Abuse    No documentation.       Patient Forms     Row Name 05/06/19 4614       Patient Forms    Provider Choice List  Delivered    Delivered to  Patient    Method of delivery  In person            Karla Lazo RN

## 2019-05-06 NOTE — NURSING NOTE
Discussion with patient and parents regarding rehab program.  Currently on bedrest with increase in left sided weakness.  Will follow.  Will require precert.

## 2019-05-06 NOTE — ACP (ADVANCE CARE PLANNING)
I was requested to see patient regarding an Advance Directive.  Patient stated that his brother and sister is his healthcare POA's and that he wants everything done.

## 2019-05-06 NOTE — THERAPY EVALUATION
Acute Care - Occupational Therapy Initial Evaluation  [facility]     Patient Name: Rocky Logan Jr.  : 1960  MRN: [id number]  Today's Date: 2019  Onset of Illness/Injury or Date of Surgery: 19  Date of Referral to OT: 19  Referring Physician: Dr Li    Admit Date: 5/3/2019       ICD-10-CM ICD-9-CM   1. Left leg weakness R29.898 729.89   2. Right pontine stroke (CMS/HCC) I63.50 434.91   3. Oropharyngeal dysphagia R13.12 787.22   4. Weakness generalized R53.1 780.79     Patient Active Problem List   Diagnosis   • Stroke (CMS/HCC)   • Type 2 diabetes mellitus with complication, with long-term current use of insulin (CMS/HCC)   • Hyperlipidemia   • Elevated TSH   • Vitamin D deficiency   • Microalbuminuria   • Subclinical hypothyroidism   • Left leg weakness   • HTN (hypertension)   • GERD without esophagitis   • History of pontine stroke   • CKD (chronic kidney disease) stage 3, GFR 30-59 ml/min (CMS/HCC)   • History of meningioma of the brain   • Acute ischemic stroke (CMS/HCC)     Past Medical History:   Diagnosis Date   • Diabetes mellitus (CMS/HCC)    • Hypertension    • Stroke (CMS/HCC)      Past Surgical History:   Procedure Laterality Date   • CARDIAC SURGERY     • HERNIA REPAIR            OT ASSESSMENT FLOWSHEET (last 12 hours)      Occupational Therapy Evaluation     Row Name 19 0843                   OT Evaluation Time/Intention    Subjective Information  complains of;weakness;fatigue  -SK        Document Type  evaluation  -SK        Mode of Treatment  occupational therapy  -SK        Patient Effort  good  -SK        Comment  Very pleasant 59 yo gentleman with h/o right sided pontine CVA 2 years ago and right craniotomy for resection of benign meningioma 10 years ago, h/o DM2, HTN, HLD, presented to ER c/o sudden onset of left sided weakness. Pt presents to OT with LUE weakness, decreased functional mobility, resulting in decreased independence with ADLs. Pt currently unable  "to move LUE for elbow fl/ex, forearm, wrist, fingers. Pt states he was able to yesterday but his LUE is progressively getting worse over night. Pt lives with parents and they are not able to assist him upon d/c. Pt will benefit from skilled OT and will benefit from inpatient rehab prior to returning home.   -SK           General Information    Patient Profile Reviewed?  yes  -SK        Onset of Illness/Injury or Date of Surgery  05/03/19  -SK        Referring Physician  Dr Li  -SK        Patient Observations  alert;cooperative;agree to therapy  -SK        Patient/Family Observations  \"My left arm is much worse today,  I can't even move it today.\"   -SK        General Observations of Patient  Pt supine in bed when OT arrived, no acute signs of distress   -SK        Prior Level of Function  independent:;grooming;dressing;bathing  -SK        Equipment Currently Used at Home  cane, straight;walker, rolling;grab bar;shower chair  -SK        Pertinent History of Current Functional Problem  Pt has Erbs Palsy, 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 so pt has learned to compensate using LUE with ADLs   -SK        Existing Precautions/Restrictions  fall  -SK        Risks Reviewed  patient:  -SK        Benefits Reviewed  patient:  -SK        Barriers to Rehab  none identified  -SK           Cognitive Assessment/Intervention- PT/OT    Orientation Status (Cognition)  oriented x 4  -SK        Follows Commands (Cognition)  WFL  -SK           Safety Issues, Functional Mobility    Impairments Affecting Function (Mobility)  balance;endurance/activity tolerance;strength  -SK           Bed Mobility Assessment/Treatment    Bed Mobility Assessment/Treatment  supine-sit;sit-supine  -SK        Supine-Sit [place] (Bed Mobility)  maximum assist (25% patient effort);1 person assist;moderate assist (50% patient effort) pt noted decreased function/strength of left side overnight  -SK        Sit-Supine [place] (Bed Mobility)  moderate " assist (50% patient effort)  -SK        Assistive Device (Bed Mobility)  bed rails  -SK        Comment (Bed Mobility)  pt able to use RUE/LE to move self up in bed with HOB flat   -SK           Transfer Assessment/Treatment    Transfer Assessment/Treatment  sit-stand transfer;stand-sit transfer;toilet transfer  -SK           ADL Assessment/Intervention    BADL Assessment/Intervention  bathing;upper body dressing;lower body dressing;grooming;toileting  -SK           Lower Body Dressing Assessment/Training    Lower Body Dressing [place] Level  dependent (less than 25% patient effort)  -SK        Lower Body Dressing Position  edge of bed sitting  -SK        Comment (Lower Body Dressing)  pt currently not able to use LUE and decreased ability to bend down to reach feet this date   -SK           Grooming Assessment/Training    [place] Level (Grooming)  wash face, hands;grooming skills;set up  -SK        Grooming Position  sitting up in bed  -SK        Comment (Grooming)  Pt require set-up to open container and hold brush while placing toothpaste on   -SK           General ROM    GENERAL ROM COMMENTS  LUE able to shrug shoulder, no bicep/tricep/forearm/finger movement this date, RUE shoulder 75% due to injury at birth   -SK           Motor Assessment/Interventions    Additional Documentation  Balance (Group);Balance Interventions (Group);Neuromuscular Re-education (Group);Therapeutic Exercise Interventions (Group);Therapeutic Exercise (Group)  -SK           Therapeutic Exercise    Upper Extremity Range of Motion (Therapeutic Exercise)  shoulder flexion/extension, left;shoulder abduction/adduction, left;elbow flexion/extension, bilateral;forearm supination/pronation, bilateral;wrist flexion/extension, bilateral  -SK        Hand (Therapeutic Exercise)  finger flexion/extension, left;thumb finger opposition, left  -SK        Exercise Type (Therapeutic Exercise)  PROM (passive range of motion)  -SK        Position  (Therapeutic Exercise)  supine  -SK        Sets/Reps (Therapeutic Exercise)  1x5  -SK        Expected Outcome (Therapeutic Exercise)  improve neuromuscular control;facilitate normal movement patterns  -SK        Comment (Therapeutic Exercise)  Pt tolerate LUE PROM   -SK           Balance    Balance  static sitting balance;static standing balance  -SK           Static Sitting Balance    Level of [place] (Unsupported Sitting, Static Balance)  contact guard assist  -SK        Sitting Position (Unsupported Sitting, Static Balance)  sitting on edge of bed  -SK        Time Able to Maintain Position (Unsupported Sitting, Static Balance)  1 to 2 minutes  -SK        Comment (Unsupported Sitting, Static Balance)  c/o faigue and asked to lay back down   -SK           Sensory Assessment/Intervention    Sensory General Assessment  no sensation deficits identified light touch present in LUE   -SK           Positioning and Restraints    Pre-Treatment Position  in bed  -SK        Post Treatment Position  bed  -SK        In Bed  exit alarm on;encouraged to call for assist;call light within reach  -SK           Pain Assessment    Additional Documentation  Pain Scale: Numbers Pre/Post-Treatment (Group)  -SK           Pain Scale: Numbers Pre/Post-Treatment    Pain Scale: Numbers, Pretreatment  0/10 - no pain  -SK        Pain Scale: Numbers, Post-Treatment  0/10 - no pain  -SK           Plan of Care Review    Plan of Care Reviewed With  patient  -SK           Clinical Impression (OT)    Date of Referral to OT  05/03/19  -SK        OT Diagnosis  Generalized weakness/ROM secondary to acute ischemic stroke resulting in decreased LUE ROM, independence with ADLs   -SK        Prognosis (OT Eval)  good for therapy at this time to increase I with ADLs to return home  -SK        Functional Level at Time of Evaluation (OT Eval)  Pt currently unable to move LUE for elbow fl/ex, forearm, wrist, fingers. Pt states he was able to yesterday but  his LUE is progressively getting worse over night.  Pt  Mod-Max assist to perform supine to sit at EOB and Mod A sit to supine, Dep to don socks, setpup for grooming/eating   -SK        Patient/Family Goals Statement (OT Eval)  To return home and be able to manage steps and care for self   -SK        Criteria for Skilled Therapeutic Interventions Met (OT Eval)  yes  -SK        Rehab Potential (OT Eval)  good, to achieve stated therapy goals  -SK        Therapy Frequency (OT Eval)  5 times/wk  -SK        Care Plan Review (OT)  evaluation/treatment results reviewed;care plan/treatment goals reviewed;patient/other agree to care plan  -SK        Anticipated Discharge Disposition (OT)  inpatient rehabilitation facility  -SK           Planned OT Interventions    Planned Therapy Interventions (OT Eval)  activity tolerance training;BADL retraining;neuromuscular control/coordination retraining;ROM/therapeutic exercise;strengthening exercise;passive ROM/stretching  -SK           OT Goals    Transfer Goal Selection (OT)  transfer, OT goal 1  -SK        Dressing Goal Selection (OT)  dressing, OT goal 1  -SK        Toileting Goal Selection (OT)  toileting, OT goal 1  -SK        ROM Goal Selection (OT)  ROM, OT goal 1  -SK        Additional Documentation  ROM Goal Selection (OT) (Row)  -SK           Transfer Goal 1 (OT)    Activity/Assistive Device (Transfer Goal 1, OT)  commode, bedside without drop arms  -SK        [place] Level/Cues Needed (Transfer Goal 1, OT)  minimum assist (75% or more patient effort)  -SK        Time Frame (Transfer Goal 1, OT)  1 week  -SK        Progress/Outcome (Transfer Goal 1, OT)  goal ongoing  -SK           Dressing Goal 1 (OT)    Activity/Assistive Device (Dressing Goal 1, OT)  upper body dressing don gown  -SK        [place]/Cues Needed (Dressing Goal 1, OT)  minimum assist (75% or more patient effort)  -SK        Time Frame (Dressing Goal 1, OT)  1 week  -SK        Progress/Outcome  (Dressing Goal 1, OT)  goal ongoing  -SK           Toileting Goal 1 (OT)    Activity/Device (Toileting Goal 1, OT)  adjust/manage clothing;perform perineal hygiene;commode, bedside without drop arms  -SK        [place] Level/Cues Needed (Toileting Goal 1, OT)  moderate assist (50-74% patient effort)  -SK        Time Frame (Toileting Goal 1, OT)  1 week  -SK        Progress/Outcome (Toileting Goal 1, OT)  goal ongoing  -SK           ROM Goal 1 (OT)    ROM Goal 1 (OT)  pt tolerate PROM and educate on AAROM using RUE to help LUE   -SK        Time Frame (ROM Goal 1, OT)  1 week  -SK        Progress/Outcome (ROM Goal 1, OT)  goal ongoing  -SK          User Key  (r) = Recorded By, (t) = Taken By, (c) = Cosigned By    Initials Name Effective Dates    Brandi Rocha OT 02/25/19 -          Occupational Therapy Education     Title: PT OT SLP Therapies (In Progress)     Topic: Occupational Therapy (In Progress)     Point: ADL training (Done)     Description: Instruct learner(s) on proper safety adaptation and remediation techniques during self care or transfers.   Instruct in proper use of assistive devices.    Learning Progress Summary           Patient Acceptance, E, VU by SK at 5/6/2019  9:35 AM    Comment:  educate pt on bed mobility, LUE AAROM/PROM ex and importance                   Point: Home exercise program (Done)     Description: Instruct learner(s) on appropriate technique for monitoring, assisting and/or progressing therapeutic exercises/activities.    Learning Progress Summary           Patient Acceptance, E, VU by SK at 5/6/2019  9:35 AM    Comment:  educate pt on bed mobility, LUE AAROM/PROM ex and importance                               User Key     Initials Effective Dates Name Provider Type Discipline    SK 02/25/19 -  Brandi Bustos OT Occupational Therapist OT                  OT Recommendation and Plan  Outcome Summary/Treatment Plan (OT)  Anticipated Discharge Disposition (OT): inpatient  rehabilitation facility  Planned Therapy Interventions (OT Eval): activity tolerance training, BADL retraining, neuromuscular control/coordination retraining, ROM/therapeutic exercise, strengthening exercise, passive ROM/stretching  Therapy Frequency (OT Eval): 5 times/wk  Plan of Care Review  Plan of Care Reviewed With: patient  Plan of Care Reviewed With: patient  Outcome Summary: Very pleasant 59 yo gentleman with h/o right sided pontine CVA 2 years ago and right craniotomy for resection of benign meningioma 10 years ago, h/o DM2, HTN, HLD, presented to ER c/o sudden onset of left sided weakness.  Pt presents to OT with LUE weakness, decreased functional mobility, resulting in decreased independence with ADLs.  Pt currently unable to move LUE for elbow fl/ex, forearm, wrist, fingers.  Pt states he was able to yesterday but his LUE is progressively getting worse over night.  Pt lives with parents and they are not able to assist him upon d/c.  Pt will benefit from skilled OT and will benefit from inpatient rehab prior to returning home.      Outcome Measures     Row Name 05/06/19 0900 05/05/19 0900          How much help from another person do you currently need...    Turning from your back to your side while in flat bed without using bedrails?  --  3  -AL     Moving from lying on back to sitting on the side of a flat bed without bedrails?  --  3  -AL     Moving to and from a bed to a chair (including a wheelchair)?  --  3  -AL     Standing up from a chair using your arms (e.g., wheelchair, bedside chair)?  --  3  -AL     Climbing 3-5 steps with a railing?  --  1  -AL     To walk in hospital room?  --  2  -AL     AM-PAC 6 Clicks Score  --  15  -AL        How much help from another is currently needed...    Putting on and taking off regular lower body clothing?  1  -SK  --     Bathing (including washing, rinsing, and drying)  2  -SK  --     Toileting (which includes using toilet bed pan or urinal)  2  -SK  --      Putting on and taking off regular upper body clothing  2  -SK  --     Taking care of personal grooming (such as brushing teeth)  3  -SK  --     Eating meals  3  -SK  --     Score  13  -SK  --        Modified Wilcox Scale    Pre-Stroke Modified Wilcox Scale  1 - No significant disability despite symptoms.  Able to carry out all usual duties and activities.  -SK  --     Modified Wilcox Scale  4 - Moderately severe disability.  Unable to walk without assistance, and unable to attend to own bodily needs without assistance.  -SK  --        Functional Assessment    Outcome Measure Options  AM-PAC 6 Clicks Daily Activity (OT);Modified [place]  -SK  AM-PAC 6 Clicks Basic Mobility (PT)  -AL       User Key  (r) = Recorded By, (t) = Taken By, (c) = Cosigned By    Initials Name Provider Type    Ellie Todd, PT Physical Therapist    Brandi Rocha OT Occupational Therapist          Time Calculation:   Time Calculation- OT     Row Name 05/06/19 0937             Time Calculation- OT    OT Start Time  0818  -SK      OT Stop Time  0843  -SK      OT Time Calculation (min)  25 min  -SK      Total Timed Code Minutes- OT  15 minute(s)  -SK        User Key  (r) = Recorded By, (t) = Taken By, (c) = Cosigned By    Initials Name Provider Type    Brandi Rocha OT Occupational Therapist        Therapy Charges for Today     Code Description Service Date Service Provider Modifiers Qty    [id number]  OT EVAL LOW COMPLEXITY 2 5/6/2019 Brandi Bustos OT GO 1    [id number]  OT SELF CARE/MGMT/TRAIN EA 15 MIN 5/6/2019 Brandi Bustos OT GO 1               Brandi Bustos OT  5/6/2019

## 2019-05-07 LAB
APTT PPP: 24.2 SECONDS (ref 22.7–35.4)
APTT PPP: 33.8 SECONDS (ref 22.7–35.4)
BASOPHILS # BLD AUTO: 0.03 10*3/MM3 (ref 0–0.2)
BASOPHILS NFR BLD AUTO: 0.4 % (ref 0–1.5)
DEPRECATED RDW RBC AUTO: 39.6 FL (ref 37–54)
EOSINOPHIL # BLD AUTO: 0.28 10*3/MM3 (ref 0–0.4)
EOSINOPHIL NFR BLD AUTO: 4.1 % (ref 0.3–6.2)
ERYTHROCYTE [DISTWIDTH] IN BLOOD BY AUTOMATED COUNT: 13 % (ref 12.3–15.4)
FOLATE SERPL-MCNC: >20 NG/ML (ref 4.78–24.2)
GLUCOSE BLDC GLUCOMTR-MCNC: 121 MG/DL (ref 70–130)
GLUCOSE BLDC GLUCOMTR-MCNC: 135 MG/DL (ref 70–130)
GLUCOSE BLDC GLUCOMTR-MCNC: 177 MG/DL (ref 70–130)
GLUCOSE BLDC GLUCOMTR-MCNC: 236 MG/DL (ref 70–130)
HCT VFR BLD AUTO: 28.8 % (ref 37.5–51)
HCYS SERPL-MCNC: 10.4 UMOL/L (ref 0–15)
HGB BLD-MCNC: 10 G/DL (ref 13–17.7)
IMM GRANULOCYTES # BLD AUTO: 0.03 10*3/MM3 (ref 0–0.05)
IMM GRANULOCYTES NFR BLD AUTO: 0.4 % (ref 0–0.5)
INR PPP: 1.04 (ref 0.9–1.1)
LYMPHOCYTES # BLD AUTO: 0.79 10*3/MM3 (ref 0.7–3.1)
LYMPHOCYTES NFR BLD AUTO: 11.6 % (ref 19.6–45.3)
MCH RBC QN AUTO: 29.5 PG (ref 26.6–33)
MCHC RBC AUTO-ENTMCNC: 34.7 G/DL (ref 31.5–35.7)
MCV RBC AUTO: 85 FL (ref 79–97)
MONOCYTES # BLD AUTO: 0.7 10*3/MM3 (ref 0.1–0.9)
MONOCYTES NFR BLD AUTO: 10.3 % (ref 5–12)
NEUTROPHILS # BLD AUTO: 4.96 10*3/MM3 (ref 1.7–7)
NEUTROPHILS NFR BLD AUTO: 73.2 % (ref 42.7–76)
NRBC BLD AUTO-RTO: 0 /100 WBC (ref 0–0.2)
PLATELET # BLD AUTO: 144 10*3/MM3 (ref 140–450)
PMV BLD AUTO: 10.7 FL (ref 6–12)
PROTHROMBIN TIME: 13.3 SECONDS (ref 11.7–14.2)
RBC # BLD AUTO: 3.39 10*6/MM3 (ref 4.14–5.8)
VIT B12 BLD-MCNC: 597 PG/ML (ref 211–946)
WBC NRBC COR # BLD: 6.79 10*3/MM3 (ref 3.4–10.8)

## 2019-05-07 PROCEDURE — 82746 ASSAY OF FOLIC ACID SERUM: CPT | Performed by: PSYCHIATRY & NEUROLOGY

## 2019-05-07 PROCEDURE — 82962 GLUCOSE BLOOD TEST: CPT

## 2019-05-07 PROCEDURE — 63710000001 INSULIN GLARGINE PER 5 UNITS: Performed by: NURSE PRACTITIONER

## 2019-05-07 PROCEDURE — 85025 COMPLETE CBC W/AUTO DIFF WBC: CPT | Performed by: PSYCHIATRY & NEUROLOGY

## 2019-05-07 PROCEDURE — 63710000001 INSULIN LISPRO (HUMAN) PER 5 UNITS: Performed by: HOSPITALIST

## 2019-05-07 PROCEDURE — 63710000001 INSULIN LISPRO (HUMAN) PER 5 UNITS: Performed by: INTERNAL MEDICINE

## 2019-05-07 PROCEDURE — 63710000001 INSULIN GLARGINE PER 5 UNITS: Performed by: HOSPITALIST

## 2019-05-07 PROCEDURE — 82607 VITAMIN B-12: CPT | Performed by: PSYCHIATRY & NEUROLOGY

## 2019-05-07 PROCEDURE — 85610 PROTHROMBIN TIME: CPT | Performed by: PSYCHIATRY & NEUROLOGY

## 2019-05-07 PROCEDURE — 83090 ASSAY OF HOMOCYSTEINE: CPT | Performed by: PSYCHIATRY & NEUROLOGY

## 2019-05-07 PROCEDURE — 85730 THROMBOPLASTIN TIME PARTIAL: CPT | Performed by: PSYCHIATRY & NEUROLOGY

## 2019-05-07 PROCEDURE — 25010000002 HEPARIN (PORCINE) PER 1000 UNITS: Performed by: PSYCHIATRY & NEUROLOGY

## 2019-05-07 RX ORDER — HEPARIN SODIUM 10000 [USP'U]/100ML
12 INJECTION, SOLUTION INTRAVENOUS
Status: DISCONTINUED | OUTPATIENT
Start: 2019-05-07 | End: 2019-05-09

## 2019-05-07 RX ORDER — INSULIN GLARGINE 100 [IU]/ML
15 INJECTION, SOLUTION SUBCUTANEOUS EVERY 12 HOURS SCHEDULED
Status: DISCONTINUED | OUTPATIENT
Start: 2019-05-07 | End: 2019-05-07

## 2019-05-07 RX ORDER — INSULIN GLARGINE 100 [IU]/ML
15 INJECTION, SOLUTION SUBCUTANEOUS EVERY MORNING
Status: DISCONTINUED | OUTPATIENT
Start: 2019-05-08 | End: 2019-05-10 | Stop reason: HOSPADM

## 2019-05-07 RX ORDER — INSULIN GLARGINE 100 [IU]/ML
10 INJECTION, SOLUTION SUBCUTANEOUS NIGHTLY
Status: DISCONTINUED | OUTPATIENT
Start: 2019-05-07 | End: 2019-05-10 | Stop reason: HOSPADM

## 2019-05-07 RX ADMIN — ATORVASTATIN CALCIUM 80 MG: 80 TABLET, FILM COATED ORAL at 08:51

## 2019-05-07 RX ADMIN — INSULIN LISPRO 5 UNITS: 100 INJECTION, SOLUTION INTRAVENOUS; SUBCUTANEOUS at 22:04

## 2019-05-07 RX ADMIN — SODIUM CHLORIDE, PRESERVATIVE FREE 3 ML: 5 INJECTION INTRAVENOUS at 08:51

## 2019-05-07 RX ADMIN — LEVOTHYROXINE SODIUM 50 MCG: 50 TABLET ORAL at 08:51

## 2019-05-07 RX ADMIN — INSULIN LISPRO 7 UNITS: 100 INJECTION, SOLUTION INTRAVENOUS; SUBCUTANEOUS at 08:50

## 2019-05-07 RX ADMIN — INSULIN LISPRO 7 UNITS: 100 INJECTION, SOLUTION INTRAVENOUS; SUBCUTANEOUS at 11:38

## 2019-05-07 RX ADMIN — HEPARIN SODIUM 12 UNITS/KG/HR: 10000 INJECTION, SOLUTION INTRAVENOUS at 14:16

## 2019-05-07 RX ADMIN — INSULIN LISPRO 3 UNITS: 100 INJECTION, SOLUTION INTRAVENOUS; SUBCUTANEOUS at 11:38

## 2019-05-07 RX ADMIN — SODIUM CHLORIDE 75 ML/HR: 9 INJECTION, SOLUTION INTRAVENOUS at 19:23

## 2019-05-07 RX ADMIN — LEVETIRACETAM 500 MG: 500 TABLET, FILM COATED ORAL at 08:51

## 2019-05-07 RX ADMIN — INSULIN GLARGINE 10 UNITS: 100 INJECTION, SOLUTION SUBCUTANEOUS at 22:07

## 2019-05-07 RX ADMIN — TICAGRELOR 90 MG: 90 TABLET ORAL at 08:50

## 2019-05-07 RX ADMIN — AMITRIPTYLINE HYDROCHLORIDE 25 MG: 25 TABLET, FILM COATED ORAL at 20:15

## 2019-05-07 RX ADMIN — SODIUM CHLORIDE 75 ML/HR: 9 INJECTION, SOLUTION INTRAVENOUS at 06:04

## 2019-05-07 RX ADMIN — AMLODIPINE BESYLATE 10 MG: 10 TABLET ORAL at 14:20

## 2019-05-07 RX ADMIN — LEVETIRACETAM 500 MG: 500 TABLET, FILM COATED ORAL at 20:15

## 2019-05-07 RX ADMIN — SODIUM CHLORIDE, PRESERVATIVE FREE 3 ML: 5 INJECTION INTRAVENOUS at 20:15

## 2019-05-07 RX ADMIN — INSULIN GLARGINE 15 UNITS: 100 INJECTION, SOLUTION SUBCUTANEOUS at 07:48

## 2019-05-07 RX ADMIN — LINAGLIPTIN 5 MG: 5 TABLET, FILM COATED ORAL at 08:51

## 2019-05-07 RX ADMIN — INSULIN LISPRO 7 UNITS: 100 INJECTION, SOLUTION INTRAVENOUS; SUBCUTANEOUS at 17:43

## 2019-05-07 RX ADMIN — ASPIRIN 81 MG: 81 TABLET, CHEWABLE ORAL at 08:51

## 2019-05-07 RX ADMIN — VITAMIN D, TAB 1000IU (100/BT) 1000 UNITS: 25 TAB at 20:15

## 2019-05-07 RX ADMIN — TICAGRELOR 90 MG: 90 TABLET ORAL at 20:14

## 2019-05-07 RX ADMIN — PANTOPRAZOLE SODIUM 40 MG: 40 TABLET, DELAYED RELEASE ORAL at 06:09

## 2019-05-07 RX ADMIN — CARVEDILOL 25 MG: 25 TABLET, FILM COATED ORAL at 08:51

## 2019-05-07 RX ADMIN — VITAMIN D, TAB 1000IU (100/BT) 1000 UNITS: 25 TAB at 08:50

## 2019-05-07 NOTE — SIGNIFICANT NOTE
Called regarding low blood sugars over the last 48 hours.  Will DC nightly basal insulin and decrease his AM basal insulin.  Continue to cover with [employer]SI.    Jero Rodrigues MD

## 2019-05-07 NOTE — PROGRESS NOTES
Continued Stay Note  [facility]     Patient Name: Rocky Logan Jr.  MRN: [id number]  Today's Date: 5/7/2019    Admit Date: 5/3/2019    Discharge Plan     Row Name 05/07/19 1010       Plan    Plan  Awaiting Bed Rest to be lifted, referral pending with [facility]---- will need [facility] precert once accepted     Patient/Family in Agreement with Plan  other (see comments)    Plan Comments  Spoke with ENRIQUE Yadav, pt continues to be on bedrest until neuro MD rounds due to increase in NIH. Await MD rounding for when Bedrest to be lifted so therapy can eval and [facility] can continue to eval. Pt will need pre-cert for rehab once medically ready. destinee [facility]        Discharge Codes    No documentation.             Karla Lazo, RN

## 2019-05-07 NOTE — PROGRESS NOTES
Neurology Note    Patient:  Rocky Logan Jr.    YOB: 1960    REFERRING PHYSICIAN:  Manohar Li MD    CHIEF COMPLAINT:    Left sided weakness    HISTORY OF PRESENT ILLNESS:   The patient is a 58 y.o. male w DM, HTN, prior right pontine stroke, admitted on 5/3 with worsening left sided weakness since 5/2. MRI with 8.8 x 5.5 mm acute right pontomedullary stroke, MRAs negative. Started on Brillinta on 5/3. On Aggrenox at home. Yday left sided weakness worsened. Repeat MRI with slight enlargement of stroke, now 10 x 8mm. Brillinta load last night and NS bolus, permissive HTN. Today feels that not much, at times able to move left side involuntarily. Cleared for po diet.    Past Medical History:  Past Medical History:   Diagnosis Date   • Diabetes mellitus (CMS/HCC)    • Hypertension    • Stroke (CMS/HCC)        Past Surgical History:  Past Surgical History:   Procedure Laterality Date   • CARDIAC SURGERY     • HERNIA REPAIR         Social History:   Social History     Socioeconomic History   • Marital status: Single     Spouse name: Not on file   • Number of children: Not on file   • Years of education: Not on file   • Highest education level: Not on file   Tobacco Use   • Smoking status: Never Smoker   • Smokeless tobacco: Never Used   Substance and Sexual Activity   • Alcohol use: Yes     Alcohol/week: 1.8 oz     Types: 3 Glasses of wine per week   • Drug use: No   • Sexual activity: Defer        Family History:   Family History   Problem Relation Age of Onset   • Heart disease Mother    • Diabetes Father        Medications Prior to Admission:    Prior to Admission medications    Medication Sig Start Date End Date Taking? Authorizing Provider   amitriptyline (ELAVIL) 25 MG tablet Take 25 mg by mouth Every Night.   Yes Provider, MD Betty   amLODIPine (NORVASC) 10 MG tablet Take 1 tablet by mouth Daily. 5/25/17  Yes Manohar Orozco MD   aspirin-dipyridamole (AGGRENOX)  MG per 12 hr  capsule Take 1 capsule by mouth 2 (Two) Times a Day.   Yes Betty Topete MD   atorvastatin (LIPITOR) 80 MG tablet TAKE ONE TABLET BY MOUTH DAILY 4/4/19  Yes Kamran Carrasco MD   carbonyl iron (FEOSOL) 45 MG tablet tablet Take 1 tablet by mouth Every Night.   Yes Betty Topete MD   carvedilol (COREG) 25 MG tablet Take 25 mg by mouth 2 (Two) Times a Day With Meals.   Yes Betty Topete MD   cholecalciferol (VITAMIN D3) 1000 units tablet Take 1,000 Units by mouth 2 (Two) Times a Day.   Yes Betty Topete MD   glucose blood (ACCU-CHEK MILENA) test strip To check to check 3 - 4 times 1/29/18  Yes Kamran Carrasco MD   glucose blood (ACCU-CHEK COMPACT PLUS) test strip To check 3 - 4 times a day 1/29/18  Yes Kamran Carrasco MD   glucose blood (FREESTYLE LITE) test strip USE TO TEST BLOOD SUGAR 4 TIMES DAILY ICD 10 CODE E11.9 2/1/18  Yes Kamran Carrasco MD   Insulin Lispro (HUMALOG KWIKPEN) 100 UNIT/ML solution pen-injector Inject 14 Units under the skin into the appropriate area as directed 3 (Three) Times a Day. 14 UNITS WITH BF 16 UNIT WITH LUNCH 18 UNITS WITH DINNER    THEN USES SLIDING SCALE ON TOP OF SCHEDULED DOSING   Yes Betty Topete MD   Lancets (ACCU-CHEK SOFT TOUCH) lancets To check 3 - 4 times a day 1/29/18  Yes Kamran Carrasco MD   levETIRAcetam (KEPPRA) 500 MG tablet Take 500 mg by mouth 2 (Two) Times a Day.   Yes Betty Topete MD   levothyroxine (SYNTHROID, LEVOTHROID) 50 MCG tablet Take 1 tablet by mouth Daily. 8/7/18 8/7/19 Yes Kamran Carrasco MD   linagliptin (TRADJENTA) 5 MG tablet tablet Take 5 mg by mouth Daily.   Yes Betty Topete MD   lisinopril (PRINIVIL,ZESTRIL) 40 MG tablet Take 1 tablet by mouth Daily. 5/25/17  Yes Manohar Orozco MD   Multiple Vitamins-Minerals (MULTIVITAMIN ADULT PO) Take 1 tablet by mouth Daily.   Yes Betty Topete MD   omega-3 acid ethyl esters (LOVAZA) 1 g capsule TAKE TWO CAPSULES BY MOUTH DAILY 4/2/19   Yes Kamran Carrasco MD   omeprazole (priLOSEC) 40 MG capsule Take 40 mg by mouth Daily.   Yes Betty Topete MD TOUJEO SOLOSTAR 300 UNIT/ML solution pen-injector INJECT 30 UNITS SUBCUTANEOUSLY EVERY MORNING AND 60 UNITS EVERY NIGHT AT BEDTIME 1/10/19  Yes Kamran Carrasco MD   TURMERIC PO Take 1 tablet by mouth 2 (Two) Times a Day.   Yes Betty Topete MD   cyclobenzaprine (FLEXERIL) 10 MG tablet Take 10 mg by mouth At Night As Needed. 7/12/18   Betty Topete MD   glucosamine-chondroitin 500-400 MG capsule capsule Take 1 capsule by mouth Daily.    Betty Topete MD       Allergies:  Sulfa antibiotics      Review of system  Review of Systems   Neurological: Positive for weakness.   All other systems reviewed and are negative.      Vitals:    05/07/19 0700   BP: 177/99   Pulse: 71   Resp: 16   Temp: 98.1 °F (36.7 °C)   SpO2: 97%       Physical exam  Physical Exam   Eyes: EOM are normal. Pupils are equal, round, and reactive to light.   Cardiovascular: Normal rate and regular rhythm.   Neurological:   Left facial droop, left arm 2-/5, left leg 2-/5, able to wiggle toes and foot, fingers, extend left arm without gravity. Speech clear.         Lab Results   Component Value Date    WBC 7.37 05/04/2019    HGB 10.2 (L) 05/04/2019    HCT 29.8 (L) 05/04/2019    MCV 86.1 05/04/2019     05/04/2019     Lab Results   Component Value Date    GLUCOSE 92 05/05/2019    BUN 21 (H) 05/05/2019    CREATININE 1.84 (H) 05/05/2019    EGFRIFNONA 38 (L) 05/05/2019    EGFRIFAFRI 42 (L) 01/31/2019    BCR 11.4 05/05/2019    CO2 21.6 (L) 05/05/2019    CALCIUM 8.4 (L) 05/05/2019    PROTENTOTREF 6.2 02/07/2018    ALBUMIN 3.20 (L) 05/04/2019    LABIL2 1.6 02/07/2018    AST 17 05/04/2019    ALT 16 05/04/2019     Vitamin B-12 211 - 946 pg/mL 597      Folate 4.78 - 24.20 ng/mL >20.00      Homocysteine, Plasma (Quant) 0.0 - 15.0 umol/L 10.4              Radiological Studies:   Ct Head Without Contrast    Result  Date: 5/5/2019  CT OF THE HEAD WITHOUT CONTRAST  HISTORY: 58-year-old male with a history of a prior stroke with worsening left-sided weakness.  TECHNIQUE: Contiguous axial images were obtained through the head without IV contrast.  COMPARISON: CT of the head without contrast, 05/03/2019.  FINDINGS: There is stable encephalomalacia within the right frontal, temporal and parietal lobes. No intracranial hemorrhage is appreciated. There is no midline shift. There is a stable cystic region and/or fluid collection within the frontal horn of the right lateral ventricle. No territorial edema is appreciated. The visualized paranasal sinuses and mastoid air cells are clear.      There is no evidence for acute intracranial abnormality or for interval change when compared to the prior head CT dated 05/03/2019. Stable evidence of a prior infarct involving the right frontal lobe and temporal lobe is noted. Evidence of a prior right craniotomy is also noted.  Radiation dose reduction techniques were utilized, including automated exposure control and exposure modulation based on body size.  This report was finalized on 5/5/2019 8:01 PM by Dr. Srini Mckeon M.D.      Ct Head Without Contrast    Result Date: 5/3/2019  EMERGENCY NONCONTRAST HEAD CT 05/03/2019  CLINICAL HISTORY: Left-sided weakness, prior pontine stroke, meningioma resection 10 years ago.  TECHNIQUE: Spiral CT images were obtained from the base of the skull to the vertex without intravenous contrast. Images were reformatted and submitted in 3 mm thick axial CT section with brain algorithm and 2 mm thick sagittal and coronal reconstructions were performed.  This is correlated with a prior MRI of brain from [facility] 05/17/2017.  FINDINGS: Patient has had a large prior 10 x 8 cm lateral right frontotemporal parietal-pterional craniotomy for resection of a right middle cranial fossa meningioma 10 years ago. There is a moderate to large area of  encephalomalacia involving the inferior lateral right frontal lobe, anterior and superior right temporal lobe.  The area of encephalomalacia measuring 8 x 5 x 5.8 cm is unchanged. There is a lentiform dural based density in the anterior inferior aspect of the right middle cranial fossa measuring 14 x 9 mm medial lateral and anterior posterior dimension, unchanged since prior MRI 05/17/2017; it is nonspecific.  It could be an area of scar or residual tumor. The previously identified 10 x 5 mm right posterior pontine infarct back on 05/17/2017 is unappreciable on the current head CT. There is some mild low density in the periventricular white matter consistent with mild small vessel disease.  The ventricles are normal in size.  I see no midline shift.  No extra-axial fluid collections are identified and there is no evidence of acute intracranial hemorrhage.  There are coarse calcified atherosclerotic plaques involving the intracranial segments of the distal vertebral artery, and extensive calcified plaques involving the cavernous and supracavernous segments of the internal carotid arteries bilaterally. There is minimal posterior left maxillary sinus mucosal thickening.  The remainder of the paranasal sinuses, mastoid air cells and middle ear cavities are clear.      1. Given the difference in modality, no significant change when compared to prior MRI of the head from [facility] 05/17/2017 with no acute abnormality seen on this head CT. 2. 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 There is a large area of encephalomalacia involving the inferior lateral right frontal lobe and the anterior and superior right temporal lobe with the area of encephalomalacia measuring 8 x 5 x 5.8 cm, unchanged.  There is a lentiform shaped area of soft tissue density in the anterior inferior medial  aspect of the right middle cranial fossa measuring 14 x 9 mm in size, unchanged since MRI 05/17/2017, it could be scar.  A small focus of residual or recurrent meningioma that is stable since 05/17/2017 cannot be excluded. 3. Mild small vessel disease in the cerebral white matter and there are extensive calcified atherosclerotic plaques in the intracranial segments of the distal vertebral arteries and cavernous and supracavernous segments of the internal carotid arteries bilaterally.  The remainder of the head CT is unremarkable. If there remains any clinical suspicion of an acute stroke causing the patient's left-sided weakness, I recommend an MRI of the brain for more complete assessment.  The results and recommendations were communicated to Dr. Obando from the emergency room by telephone 05/03/2019 at 12:30 PM.  Radiation dose reduction techniques were utilized, including automated exposure control and exposure modulation based on body size.  This report was finalized on 5/3/2019 3:39 PM by Dr. Alvin Hutchison M.D.      Mri Angiogram Head Without Contrast    Result Date: 5/3/2019  Examination: MRI of the brain without contrast and MRA of the neck without contrast and MRA of the head without contrast  HISTORY: 58-year-old male with history of left-sided weakness and decreased renal function. The patient is status post craniotomy in 2009 for meningioma.  TECHNIQUE: Multiplanar and multisequence images of the brain were obtained without intravenous contrast. Time-of-flight noncontrast angiographic images of the intracranial and cervical arterial structures were obtained.  COMPARISON: MRI of the brain without contrast, 05/17/2017  FINDINGS: Within the right aspect of the medulla at its junction with the jordin there is a 0.9 cm focus of restricted diffusion consistent with an area of acute ischemia. No other areas of acute diffusion are appreciated. There is a stable area of encephalomalacia within the right frontal,  parietal and temporal lobes with ex vacuo dilatation of the right lateral ventricle. There is a stable 3.6 x 3.0 cm intraventricular fluid containing mass associated with the patient's prior craniotomy. Areas of encephalomalacia within the right cerebral hemisphere periventricular white matter are noted. Postsurgical craniotomy changes of the right frontal and parietal bones are noted.  FINDINGS of the MRA of the neck: There is normal flow-related enhancement within the bilateral vertebral, common carotid and cervical internal carotid arteries without evidence for stenosis, vascular malformation or aneurysm.  FINDINGS of the MRI of the head: There is normal flow-related enhancement within the basilar artery, posterior cerebral arteries, anterior cerebral arteries and middle cerebral arteries without evidence for aneurysm or stenosis.      1. There is a 9 mm area of restricted diffusion in the right medulla at its junction with the jordin that is consistent with an area of acute ischemia. 2. Normal MRA of the head and neck without contrast.  NASCET criteria were used in this interpretation. 3. There is stable encephalomalacia in the right frontal and parietal lobes in the area of prior infarction associated with a prior craniotomy.  These findings were communicated to the stroke physician on-call, Dr. Winkler, on 05/03/2019 at 8:55 PM.  This report was finalized on 5/3/2019 9:01 PM by Dr. Srini Mckeon M.D.      Mri Angiogram Neck Without Contrast    Result Date: 5/3/2019  Examination: MRI of the brain without contrast and MRA of the neck without contrast and MRA of the head without contrast  HISTORY: 58-year-old male with history of left-sided weakness and decreased renal function. The patient is status post craniotomy in 2009 for meningioma.  TECHNIQUE: Multiplanar and multisequence images of the brain were obtained without intravenous contrast. Time-of-flight noncontrast angiographic images of the intracranial and  cervical arterial structures were obtained.  COMPARISON: MRI of the brain without contrast, 05/17/2017  FINDINGS: Within the right aspect of the medulla at its junction with the jordin there is a 0.9 cm focus of restricted diffusion consistent with an area of acute ischemia. No other areas of acute diffusion are appreciated. There is a stable area of encephalomalacia within the right frontal, parietal and temporal lobes with ex vacuo dilatation of the right lateral ventricle. There is a stable 3.6 x 3.0 cm intraventricular fluid containing mass associated with the patient's prior craniotomy. Areas of encephalomalacia within the right cerebral hemisphere periventricular white matter are noted. Postsurgical craniotomy changes of the right frontal and parietal bones are noted.  FINDINGS of the MRA of the neck: There is normal flow-related enhancement within the bilateral vertebral, common carotid and cervical internal carotid arteries without evidence for stenosis, vascular malformation or aneurysm.  FINDINGS of the MRI of the head: There is normal flow-related enhancement within the basilar artery, posterior cerebral arteries, anterior cerebral arteries and middle cerebral arteries without evidence for aneurysm or stenosis.      1. There is a 9 mm area of restricted diffusion in the right medulla at its junction with the jordin that is consistent with an area of acute ischemia. 2. Normal MRA of the head and neck without contrast.  NASCET criteria were used in this interpretation. 3. There is stable encephalomalacia in the right frontal and parietal lobes in the area of prior infarction associated with a prior craniotomy.  These findings were communicated to the stroke physician on-call, Dr. Winkler, on 05/03/2019 at 8:55 PM.  This report was finalized on 5/3/2019 9:01 PM by Dr. Srini Mckeon M.D.      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  TECHNIQUE: Axial T1, FLAIR, fat-suppressed T2 and gradient echo T2 axial diffusion sagittal T1-weighted images were obtained of the entire head.  This is correlated to a recent prior MRI of the head just 3 days ago on 05/03/2019 as well as an MRI 2 years ago on 05/17/2017.  FINDINGS:  There is an ovoid area of intense increased signal intensity on the diffusion-weighted images involving the right anterior jordin flow signal on ADC maps and is consistent with an acute right anterior pontine lacunar type infarct that has slightly increased in size when compared to MRI 3 days ago 05/03/2019. The acute infarct currently measures 10 x 8 mm in anterior posterior and medial lateral dimension and on MRI 3 days ago it measured about 9 x 5 mm, thus the lacunar infarct has extended slightly in the interval. 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  The area of encephalomalacia tracks up to 10 x 6 x 5 cm. In the anterior medial aspect of the right middle cranial fossa is a lentiform-shaped area of T1 isointensity and T2 mild hyperintensity measuring 16 x 7 mm in oblique, medial lateral and anterior posterior dimension.  It is probably an area of residual or recurrent meningioma, it is unchanged since MRI 3 days ago as well as MRI 2 years ago on 05/17/2017. Due to volume loss,  there is an ipsilateral enlargement of the frontal horn and anterior body of the right lateral ventricle.  Otherwise, the ventricular system is normal in size.  There is no midline shift.  No extra-axial fluid collections are identified. Paranasal sinuses, mastoid air cells and middle ear cavities are clear.  Good flow-voids are demonstrated in the cerebral vessels and in the dural venous sinuses.      1. Since MRI 3 days ago, 05/03/2019, there has been a slight interval increase in the ovoid acute lacunar infarct in the right anterior jordin. Previously on 05/03/2019 it measured 8.8 x 5.5 mm in anterior posterior and medial lateral dimension, now it measures 10 x 8 mm in anterior posterior, medial lateral dimension and has increased and extended slightly by several millimeters when compared to the MRI 3 days ago and could account for the increasing weakness. The remainder of the MRI of the brain is completely unchanged dating back to an MRI of the head 05/17/2017, two years ago. 2. 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 There is an abnormal area of lentiform extra-axial soft tissue in the anterior medial aspect of the right middle cranial fossa that maximally measures 16 x 7 mm in medial lateral and anterior posterior dimension, suspicious for an area of residual or recurrent meningioma that is unchanged since prior MRI 2 years ago 05/17/2017. The remainder of  the MRI of the head is unremarkable.  The results were communicated to Ramses Carrasco MD by telephone 05/06/2019 at 4:30 PM.      Mri Brain Without Contrast    Result Date: 5/3/2019  Examination: MRI of the brain without contrast and MRA of the neck without contrast and MRA of the head without contrast  HISTORY: 58-year-old male with history of left-sided weakness and decreased renal function. The patient is status post craniotomy in 2009 for meningioma.  TECHNIQUE: Multiplanar and multisequence images of the brain were obtained without intravenous contrast. Time-of-flight noncontrast angiographic images of the intracranial and cervical arterial structures were obtained.  COMPARISON: MRI of the brain without contrast, 05/17/2017  FINDINGS: Within the right aspect of the medulla at its junction with the jordin there is a 0.9 cm focus of restricted diffusion consistent with an area of acute ischemia. No other areas of acute diffusion are appreciated. There is a stable area of encephalomalacia within the right frontal, parietal and temporal lobes with ex vacuo dilatation of the right lateral ventricle. There is a stable 3.6 x 3.0 cm intraventricular fluid containing mass associated with the patient's prior craniotomy. Areas of encephalomalacia within the right cerebral hemisphere periventricular white matter are noted. Postsurgical craniotomy changes of the right frontal and parietal bones are noted.  FINDINGS of the MRA of the neck: There is normal flow-related enhancement within the bilateral vertebral, common carotid and cervical internal carotid arteries without evidence for stenosis, vascular malformation or aneurysm.  FINDINGS of the MRI of the head: There is normal flow-related enhancement within the basilar artery, posterior cerebral arteries, anterior cerebral arteries and middle cerebral arteries without evidence for aneurysm or stenosis.      1. There is a 9 mm area of restricted diffusion in the right medulla  at its junction with the jordin that is consistent with an area of acute ischemia. 2. Normal MRA of the head and neck without contrast.  NASCET criteria were used in this interpretation. 3. There is stable encephalomalacia in the right frontal and parietal lobes in the area of prior infarction associated with a prior craniotomy.  These findings were communicated to the stroke physician on-call, Dr. Winkler, on 05/03/2019 at 8:55 PM.  This report was finalized on 5/3/2019 9:01 PM by Dr. Srini Mckeon M.D.          During this visit the following were done:  Labs Reviewed [x]    Labs Ordered [x]    Radiology Reports Reviewed [x]    Radiology Ordered []    EKG, echo, and/or stress test reviewed []    EEG results reviewed  []    EEG reviewed and interpreted per myself   []    Discussed case with neurointerventionalist or neuroradiologist []    Referring Provider Records Reviewed []    ER Records Reviewed []    Hospital Records Reviewed []    History Obtained From Family []    Radiological images view and Interpreted per myself [x]    Case Discussed with referring provider []     Decision to obtain and request outside records  []        Assessment and Plan     Extension of right pontomedullary stroke secondary to small vessel thrombosis. Fluctuating symptoms.   - Continue observation on telemetry with neuro checks.   - IV heparin drip for 48 hours.   - Continue ASA and Brillinta.   - High dose statin.   - IVF.   - Maintain -200.    Discussed guarded prognosis with patient and family, hoping that symptoms will stabilize and improve with rehab.      Electronically signed by Afshin Louie MD on 5/7/2019 at 12:38 PM

## 2019-05-07 NOTE — PROGRESS NOTES
Progress Note    Name: Rocky Logan Jr. ADMIT: 5/3/2019   : 1960  PCP: Karen Martinez APRN    MRN: [id number] LOS: 3 days   AGE/SEX: 58 y.o. male  ROOM: [facility]   Date of Encounter Visit: 19    Subjective:     Chief Complaint: follow up CVA, DM and HTN    Interval History: started on heparin drip for progression of stroke. Blood sugars better today.     REVIEW OF SYSTEMS:   CONSTITUTIONAL: no fever or chills  CARDIOVASCULAR: No chest pain or palpitations.    RESPIRATORY: No shortness of breath, cough or sputum.   GASTROINTESTINAL: No anorexia, nausea, vomiting or diarrhea. No abdominal pain or blood.   HEMATOLOGIC: No bleeding or bruising.  Still decreased movements on left side of body.     Objective:   Temp:  [98.1 °F (36.7 °C)-98.6 °F (37 °C)] 98.6 °F (37 °C)  Heart Rate:  [67-78] 74  Resp:  [16] 16  BP: (139-186)/(90-99) 178/98   SpO2:  [95 %-98 %] 95 %  on    Device (Oxygen Therapy): room air    Intake/Output Summary (Last 24 hours) at 2019 1706  Last data filed at 2019 1500  Gross per 24 hour   Intake 2200 ml   Output 1700 ml   Net 500 ml     Body mass index is 36.95 kg/m².      19  0513 19  0500 19  0500   Weight: 101 kg (222 lb 6.4 oz) 102 kg (225 lb 1.6 oz) 104 kg (228 lb 12.8 oz)     Weight change: 1.678 kg (3 lb 11.2 oz)    Physical Exam   Constitutional: He is oriented to person, place, and time. He appears well-developed and well-nourished. No distress.   HENT:   Head: Normocephalic and atraumatic.   Eyes: Conjunctivae are normal. No scleral icterus.   Neck: Neck supple. No tracheal deviation present.   Cardiovascular: Normal rate, regular rhythm and normal heart sounds.   No murmur heard.  Pulmonary/Chest: Effort normal and breath sounds normal. He has no wheezes. He has no rales.   Abdominal: Soft. Bowel sounds are normal. There is no tenderness.   Musculoskeletal: He exhibits edema (trace BLE and LUE).   Neurological: He is alert and oriented to person,  place, and time. No sensory deficit.   Able to move pointer finger on left hand, but otherwise flaccid.   2/5 strength of LLE.    Skin: Skin is warm and dry. He is not diaphoretic.   Psychiatric: He has a normal mood and affect. His behavior is normal.   Nursing note and vitals reviewed.    Results Review:      Results from last 7 days   Lab Units 05/05/19  0559 05/04/19  0433 05/03/19  1404   SODIUM mmol/L 144 144 145   POTASSIUM mmol/L 3.9 3.7 4.8   CHLORIDE mmol/L 111* 112* 111*   CO2 mmol/L 21.6* 22.1 22.8   BUN mg/dL 21* 23* 26*   CREATININE mg/dL 1.84* 2.05* 2.00*   GLUCOSE mg/dL 92 86 131*   CALCIUM mg/dL 8.4* 8.3* 8.8   AST (SGOT) U/L  --  17  --    ALT (SGPT) U/L  --  16  --      Estimated Creatinine Clearance: 48.9 mL/min (A) (by C-G formula based on SCr of 1.84 mg/dL (H)).  Results from last 7 days   Lab Units 05/04/19  0433   HEMOGLOBIN A1C % 7.10*     Results from last 7 days   Lab Units 05/07/19  1610 05/07/19  1112 05/07/19  0616 05/06/19  2054 05/06/19  1956 05/06/19  1644 05/06/19  1039 05/06/19  0613   GLUCOSE mg/dL 121 177* 135* 199* 188* 138* 142* 137*                       Invalid input(s):  PHOS  Results from last 7 days   Lab Units 05/04/19  0433   CHOLESTEROL mg/dL 175   TRIGLYCERIDES mg/dL 211*   HDL CHOL mg/dL 25*     Results from last 7 days   Lab Units 05/07/19  1310 05/04/19  0433 05/03/19  1331   WBC 10*3/mm3 6.79 7.37 8.10   HEMOGLOBIN g/dL 10.0* 10.2* 11.7*   HEMATOCRIT % 28.8* 29.8* 33.8*   PLATELETS 10*3/mm3 144 156 168   MCV fL 85.0 86.1 85.8   MCH pg 29.5 29.5 29.7   MCHC g/dL 34.7 34.2 34.6   RDW % 13.0 13.4 13.3   RDW-SD fl 39.6 41.5 41.0   MPV fL 10.7 10.8 10.6   NEUTROPHIL % % 73.2 69.4 71.9   LYMPHOCYTE % % 11.6* 15.5* 13.0*   MONOCYTES % % 10.3 10.2 9.9   EOSINOPHIL % % 4.1 3.8 4.3   BASOPHIL % % 0.4 0.7 0.5   IMM GRAN % % 0.4 0.4 0.4   NEUTROS ABS 10*3/mm3 4.96 5.12 5.83   LYMPHS ABS 10*3/mm3 0.79 1.14 1.05   MONOS ABS 10*3/mm3 0.70 0.75 0.80   EOS ABS 10*3/mm3 0.28 0.28  0.35   BASOS ABS 10*3/mm3 0.03 0.05 0.04   IMMATURE GRANS (ABS) 10*3/mm3 0.03 0.03 0.03   NRBC /100 WBC 0.0 0.0 0.0     Results from last 7 days   Lab Units 05/07/19  1310   INR  1.04   APTT seconds 24.2                                       Imaging:  Imaging Results (last 24 hours)     Procedure Component Value Units Date/Time    MRI Brain Without Contrast [[id number]] Collected:  05/06/19 1742     Updated:  05/06/19 1742    Narrative:       MRI BRAIN WITHOUT CONTRAST 05/06/2019     CLINICAL HISTORY: Patient has history of craniotomy 10 years ago for  resection of right middle cranial fossa-sphenoid wing meningioma and has  current worsening of left-sided weakness.     TECHNIQUE: Axial T1, FLAIR, fat-suppressed T2 and gradient echo T2 axial  diffusion sagittal T1-weighted images were obtained of the entire head.     This is correlated to a recent prior MRI of the head just 3 days ago on  05/03/2019 as well as an MRI 2 years ago on 05/17/2017.     FINDINGS:  There is an ovoid area of intense increased signal intensity  on the diffusion-weighted images involving the right anterior jordin flow  signal on ADC maps and is consistent with an acute right anterior  pontine lacunar type infarct that has slightly increased in size when  compared to MRI 3 days ago 05/03/2019. The acute infarct currently  measures 10 x 8 mm in anterior posterior and medial lateral dimension  and on MRI 3 days ago it measured about 9 x 5 mm, thus the lacunar  infarct has extended slightly in the interval. This patient has had a  prior 8 cm lateral right frontotemporal parietal craniotomy for  resection of a right middle cranial fossa-sphenoid wing meningioma in  the remote past, and there is a large area of encephalomalacia involving  the anterior inferior lateral right frontal lobe extending across the  right frontotemporal operculum and involving the anterior and superior  medial right temporal lobe as well as the anterior right  external  capsule, putamen and anterior limb of the right internal capsule and  anterior right corona radiata region.  The area of encephalomalacia  tracks up to 10 x 6 x 5 cm. In the anterior medial aspect of the right  middle cranial fossa is a lentiform-shaped area of T1 isointensity and  T2 mild hyperintensity measuring 16 x 7 mm in oblique, medial lateral  and anterior posterior dimension.  It is probably an area of residual or  recurrent meningioma, it is unchanged since MRI 3 days ago as well as  MRI 2 years ago on 05/17/2017. Due to volume loss, there is an  ipsilateral enlargement of the frontal horn and anterior body of the  right lateral ventricle.  Otherwise, the ventricular system is normal in  size.  There is no midline shift.  No extra-axial fluid collections are  identified. Paranasal sinuses, mastoid air cells and middle ear cavities  are clear.  Good flow-voids are demonstrated in the cerebral vessels and  in the dural venous sinuses.       Impression:       1. Since MRI 3 days ago, 05/03/2019, there has been a slight interval  increase in the ovoid acute lacunar infarct in the right anterior jordin.   Previously on 05/03/2019 it measured 8.8 x 5.5 mm in anterior posterior  and medial lateral dimension, now it measures 10 x 8 mm in anterior  posterior, medial lateral dimension and has increased and extended  slightly by several millimeters when compared to the MRI 3 days ago and  could account for the increasing weakness. The remainder of the MRI of  the brain is completely unchanged dating back to an MRI of the head  05/17/2017, two years ago.  2. This patient has had a previous 8 cm lateral right frontotemporal  parietal craniotomy for resection of a right middle cranial  fossa-sphenoid wing meningioma and there is a large area of  encephalomalacia extending from the anterior inferolateral right frontal  lobe into the anterior and superior right temporal lobe, anterior right  insular cortex,  external capsule, putamen and anterior limb of the right  internal capsule compatible with a large old right middle cerebral  artery territory infarct measuring up to 10 x 6 x 5 cm in anterior  posterior, medial lateral and cranial caudal dimension, unchanged.   There is an abnormal area of lentiform extra-axial soft tissue in the  anterior medial aspect of the right middle cranial fossa that maximally  measures 16 x 7 mm in medial lateral and anterior posterior dimension,  suspicious for an area of residual or recurrent meningioma that is  unchanged since prior MRI 2 years ago 05/17/2017. The remainder of the  MRI of the head is unremarkable.  The results were communicated to Ramses Carrasco MD by telephone 05/06/2019 at 4:30 PM.              I reviewed the patient's new clinical results and medications.         Medication Review:   Scheduled Meds:  amitriptyline 25 mg Oral Nightly   amLODIPine 10 mg Oral Q24H   aspirin 81 mg Oral Daily   atorvastatin 80 mg Oral Daily   carvedilol 25 mg Oral BID With Meals   cholecalciferol 1,000 Units Oral BID   folic acid 1 mg Oral Daily   insulin glargine 15 Units Subcutaneous QAM   insulin lispro 0-14 Units Subcutaneous 4x Daily With Meals & Nightly   insulin lispro 7 Units Subcutaneous TID With Meals   levETIRAcetam 500 mg Oral Q12H   levothyroxine 50 mcg Oral Daily   linagliptin 5 mg Oral Daily   pantoprazole 40 mg Oral QAM   sodium chloride 3 mL Intravenous Q12H   ticagrelor 90 mg Oral BID     Continuous Infusions:  heparin (porcine) 12 Units/kg/hr Last Rate: 12 Units/kg/hr (05/07/19 1416)   sodium chloride 75 mL/hr Last Rate: 75 mL/hr (05/07/19 0604)     PRN Meds:.•  acetaminophen **OR** acetaminophen  •  cyclobenzaprine  •  dextrose  •  dextrose  •  glucagon (human recombinant)  •  ondansetron **OR** ondansetron  •  [COMPLETED] Insert peripheral IV **AND** sodium chloride  •  sodium chloride    Problem List:     Active Hospital Problems    Diagnosis  POA   • **Acute  ischemic stroke (CMS/[facility]) [I63.9]  Yes   • Left leg weakness [R29.898]  Yes   • HTN (hypertension) [I10]  Yes   • GERD without esophagitis [K21.9]  Yes   • History of pontine stroke [Z86.73]  Not Applicable   • CKD (chronic kidney disease) stage 3, GFR 30-59 ml/min (CMS/[facility]) [N18.3]  Yes   • History of meningioma of the brain [Z86.011]  Not Applicable   • Subclinical hypothyroidism [E03.9]  Yes   • Type 2 diabetes mellitus with complication, with long-term current use of insulin (CMS/[facility]) [E11.8, Z79.4]  Not Applicable   • Vitamin D deficiency [E55.9]  Yes   • Hyperlipidemia [E78.5]  Yes      Resolved Hospital Problems   No resolved problems to display.       Assessment and Plan:     · Acute ischemic stroke. Extension of right potomedullary stroke felt to be secondary to small vessel thrombosis. Failed plavix in the past and was on aggrenox on admit and now on Brilinta and Aspirin. TTE showed no PFO and normal EF. Heparin drip planned for 48 hours.   · HTN. Keep BP high and continue with IVF. Continue coreg and norvasc with parameters  · DM type 2- insulin adjusted for low sugars now increased today. Will add back lantus 10 units at night. Continue lantus 15 in am and humalog 7 units TID with meals plus SS correction.   · CKD- creatinine improved some today. Continue IVF. Avoid nephrotoxic meds.   · HLD- . Continue Lipitor 80 given recent stroke    VTE prophylaxis: heparin  CODE status: full code  Disposition: will need acute rehab once stable    I discussed the patients findings and my recommendations with patient and family.    Omayra Finn, SHOBHA  05/07/19  5:06 PM

## 2019-05-07 NOTE — NURSING NOTE
RN paged Neurology to review case and notify MD that patient's NIHSS is now a 5. RN spoke to Valencia BUTLER. MD discussed case with RN and orders received to increase dose of Brilinta to 90mg BID and give one time dose of Brilinta 120mg now; orders to hold norvasc for SBP <150; Give Folate 1mg daily and order am labs. RN monitoring patient's neuro status closely.

## 2019-05-07 NOTE — PLAN OF CARE
Problem: Patient Care Overview  Goal: Plan of Care Review  Outcome: Ongoing (interventions implemented as appropriate)   05/07/19 0417   Coping/Psychosocial   Plan of Care Reviewed With patient   Plan of Care Review   Progress no change   OTHER   Outcome Summary Patient slept well tonight, patient had no complaints of pain. Patient's NIHSS increased to 5 tonight. Neuro MD aware. Orders received. Over the night RN noted more movement in fingers of left hand although patient's  remains absent at this time. RN continues to monitor neuro status very closely. Patient remains A&Ox4, patient continues to swallow pills well in pudding. Patient remains on regular cardiac diet. Continue to monitor closely, continue with current plan of care       Problem: Fall Risk (Adult)  Goal: Absence of Fall  Outcome: Ongoing (interventions implemented as appropriate)      Problem: Stroke (Ischemic) (Adult)  Goal: Signs and Symptoms of Listed Potential Problems Will be Absent, Minimized or Managed (Stroke)  Outcome: Ongoing (interventions implemented as appropriate)      Problem: Skin Injury Risk (Adult)  Goal: Identify Related Risk Factors and Signs and Symptoms  Outcome: Outcome(s) achieved Date Met: 05/07/19    Goal: Skin Health and Integrity  Outcome: Ongoing (interventions implemented as appropriate)

## 2019-05-07 NOTE — NURSING NOTE
Follow up discussion with patient and sister.  Continues on bedrest since worsening of Left hemiparesis.  There is concern about discharge plan as he plans on returning to his second floor bedroom.  Lives with parents, who are elderly and unable to provide physical assistance.  Will continue to follow.

## 2019-05-08 LAB
APTT PPP: 46.3 SECONDS (ref 22.7–35.4)
APTT PPP: 59.6 SECONDS (ref 22.7–35.4)
APTT PPP: 61.3 SECONDS (ref 22.7–35.4)
APTT PPP: 90.5 SECONDS (ref 22.7–35.4)
BASOPHILS # BLD AUTO: 0.03 10*3/MM3 (ref 0–0.2)
BASOPHILS # BLD AUTO: 0.04 10*3/MM3 (ref 0–0.2)
BASOPHILS NFR BLD AUTO: 0.4 % (ref 0–1.5)
BASOPHILS NFR BLD AUTO: 0.5 % (ref 0–1.5)
DEPRECATED RDW RBC AUTO: 40.1 FL (ref 37–54)
DEPRECATED RDW RBC AUTO: 40.6 FL (ref 37–54)
EOSINOPHIL # BLD AUTO: 0.28 10*3/MM3 (ref 0–0.4)
EOSINOPHIL # BLD AUTO: 0.33 10*3/MM3 (ref 0–0.4)
EOSINOPHIL NFR BLD AUTO: 4.1 % (ref 0.3–6.2)
EOSINOPHIL NFR BLD AUTO: 4.5 % (ref 0.3–6.2)
ERYTHROCYTE [DISTWIDTH] IN BLOOD BY AUTOMATED COUNT: 13.1 % (ref 12.3–15.4)
ERYTHROCYTE [DISTWIDTH] IN BLOOD BY AUTOMATED COUNT: 13.2 % (ref 12.3–15.4)
GLUCOSE BLDC GLUCOMTR-MCNC: 110 MG/DL (ref 70–130)
GLUCOSE BLDC GLUCOMTR-MCNC: 169 MG/DL (ref 70–130)
GLUCOSE BLDC GLUCOMTR-MCNC: 175 MG/DL (ref 70–130)
GLUCOSE BLDC GLUCOMTR-MCNC: 217 MG/DL (ref 70–130)
HCT VFR BLD AUTO: 28.8 % (ref 37.5–51)
HCT VFR BLD AUTO: 30.4 % (ref 37.5–51)
HGB BLD-MCNC: 10.5 G/DL (ref 13–17.7)
HGB BLD-MCNC: 9.9 G/DL (ref 13–17.7)
IMM GRANULOCYTES # BLD AUTO: 0.03 10*3/MM3 (ref 0–0.05)
IMM GRANULOCYTES # BLD AUTO: 0.03 10*3/MM3 (ref 0–0.05)
IMM GRANULOCYTES NFR BLD AUTO: 0.4 % (ref 0–0.5)
IMM GRANULOCYTES NFR BLD AUTO: 0.4 % (ref 0–0.5)
LYMPHOCYTES # BLD AUTO: 1.04 10*3/MM3 (ref 0.7–3.1)
LYMPHOCYTES # BLD AUTO: 1.05 10*3/MM3 (ref 0.7–3.1)
LYMPHOCYTES NFR BLD AUTO: 14.1 % (ref 19.6–45.3)
LYMPHOCYTES NFR BLD AUTO: 15.2 % (ref 19.6–45.3)
MCH RBC QN AUTO: 29.3 PG (ref 26.6–33)
MCH RBC QN AUTO: 29.5 PG (ref 26.6–33)
MCHC RBC AUTO-ENTMCNC: 34.4 G/DL (ref 31.5–35.7)
MCHC RBC AUTO-ENTMCNC: 34.5 G/DL (ref 31.5–35.7)
MCV RBC AUTO: 84.9 FL (ref 79–97)
MCV RBC AUTO: 85.7 FL (ref 79–97)
MONOCYTES # BLD AUTO: 0.75 10*3/MM3 (ref 0.1–0.9)
MONOCYTES # BLD AUTO: 0.81 10*3/MM3 (ref 0.1–0.9)
MONOCYTES NFR BLD AUTO: 10.9 % (ref 5–12)
MONOCYTES NFR BLD AUTO: 11 % (ref 5–12)
NEUTROPHILS # BLD AUTO: 4.75 10*3/MM3 (ref 1.7–7)
NEUTROPHILS # BLD AUTO: 5.13 10*3/MM3 (ref 1.7–7)
NEUTROPHILS NFR BLD AUTO: 69 % (ref 42.7–76)
NEUTROPHILS NFR BLD AUTO: 69.5 % (ref 42.7–76)
NRBC BLD AUTO-RTO: 0 /100 WBC (ref 0–0.2)
NRBC BLD AUTO-RTO: 0 /100 WBC (ref 0–0.2)
PLATELET # BLD AUTO: 130 10*3/MM3 (ref 140–450)
PLATELET # BLD AUTO: 147 10*3/MM3 (ref 140–450)
PMV BLD AUTO: 10.6 FL (ref 6–12)
PMV BLD AUTO: 10.8 FL (ref 6–12)
RBC # BLD AUTO: 3.36 10*6/MM3 (ref 4.14–5.8)
RBC # BLD AUTO: 3.58 10*6/MM3 (ref 4.14–5.8)
WBC NRBC COR # BLD: 6.89 10*3/MM3 (ref 3.4–10.8)
WBC NRBC COR # BLD: 7.38 10*3/MM3 (ref 3.4–10.8)

## 2019-05-08 PROCEDURE — 85730 THROMBOPLASTIN TIME PARTIAL: CPT | Performed by: PSYCHIATRY & NEUROLOGY

## 2019-05-08 PROCEDURE — 25010000002 HEPARIN (PORCINE) PER 1000 UNITS: Performed by: PSYCHIATRY & NEUROLOGY

## 2019-05-08 PROCEDURE — 85025 COMPLETE CBC W/AUTO DIFF WBC: CPT | Performed by: PSYCHIATRY & NEUROLOGY

## 2019-05-08 PROCEDURE — 85730 THROMBOPLASTIN TIME PARTIAL: CPT | Performed by: INTERNAL MEDICINE

## 2019-05-08 PROCEDURE — 82962 GLUCOSE BLOOD TEST: CPT

## 2019-05-08 PROCEDURE — 63710000001 INSULIN LISPRO (HUMAN) PER 5 UNITS: Performed by: HOSPITALIST

## 2019-05-08 PROCEDURE — 63710000001 INSULIN LISPRO (HUMAN) PER 5 UNITS: Performed by: INTERNAL MEDICINE

## 2019-05-08 PROCEDURE — 63710000001 INSULIN GLARGINE PER 5 UNITS: Performed by: NURSE PRACTITIONER

## 2019-05-08 RX ORDER — BACLOFEN 10 MG/1
5 TABLET ORAL 2 TIMES DAILY PRN
Status: DISCONTINUED | OUTPATIENT
Start: 2019-05-08 | End: 2019-05-09

## 2019-05-08 RX ORDER — BISACODYL 10 MG
10 SUPPOSITORY, RECTAL RECTAL DAILY PRN
Status: DISCONTINUED | OUTPATIENT
Start: 2019-05-08 | End: 2019-05-10 | Stop reason: HOSPADM

## 2019-05-08 RX ORDER — SENNA AND DOCUSATE SODIUM 50; 8.6 MG/1; MG/1
2 TABLET, FILM COATED ORAL NIGHTLY
Status: DISCONTINUED | OUTPATIENT
Start: 2019-05-08 | End: 2019-05-10 | Stop reason: HOSPADM

## 2019-05-08 RX ADMIN — FOLIC ACID 1 MG: 1 TABLET ORAL at 08:34

## 2019-05-08 RX ADMIN — INSULIN LISPRO 3 UNITS: 100 INJECTION, SOLUTION INTRAVENOUS; SUBCUTANEOUS at 08:36

## 2019-05-08 RX ADMIN — VITAMIN D, TAB 1000IU (100/BT) 1000 UNITS: 25 TAB at 08:34

## 2019-05-08 RX ADMIN — CARVEDILOL 25 MG: 25 TABLET, FILM COATED ORAL at 08:34

## 2019-05-08 RX ADMIN — AMITRIPTYLINE HYDROCHLORIDE 25 MG: 25 TABLET, FILM COATED ORAL at 21:32

## 2019-05-08 RX ADMIN — LEVETIRACETAM 500 MG: 500 TABLET, FILM COATED ORAL at 08:34

## 2019-05-08 RX ADMIN — SODIUM CHLORIDE 75 ML/HR: 9 INJECTION, SOLUTION INTRAVENOUS at 21:35

## 2019-05-08 RX ADMIN — SENNOSIDES,DOCUSATE SODIUM 2 TABLET: 50; 8.6 TABLET, FILM COATED ORAL at 21:32

## 2019-05-08 RX ADMIN — ATORVASTATIN CALCIUM 80 MG: 80 TABLET, FILM COATED ORAL at 08:34

## 2019-05-08 RX ADMIN — INSULIN LISPRO 3 UNITS: 100 INJECTION, SOLUTION INTRAVENOUS; SUBCUTANEOUS at 21:32

## 2019-05-08 RX ADMIN — LEVETIRACETAM 500 MG: 500 TABLET, FILM COATED ORAL at 21:32

## 2019-05-08 RX ADMIN — LEVOTHYROXINE SODIUM 50 MCG: 50 TABLET ORAL at 08:34

## 2019-05-08 RX ADMIN — INSULIN LISPRO 5 UNITS: 100 INJECTION, SOLUTION INTRAVENOUS; SUBCUTANEOUS at 11:26

## 2019-05-08 RX ADMIN — HEPARIN SODIUM 18 UNITS/KG/HR: 10000 INJECTION, SOLUTION INTRAVENOUS at 04:25

## 2019-05-08 RX ADMIN — PANTOPRAZOLE SODIUM 40 MG: 40 TABLET, DELAYED RELEASE ORAL at 06:21

## 2019-05-08 RX ADMIN — ASPIRIN 81 MG: 81 TABLET, CHEWABLE ORAL at 08:34

## 2019-05-08 RX ADMIN — INSULIN LISPRO 7 UNITS: 100 INJECTION, SOLUTION INTRAVENOUS; SUBCUTANEOUS at 08:34

## 2019-05-08 RX ADMIN — INSULIN GLARGINE 15 UNITS: 100 INJECTION, SOLUTION SUBCUTANEOUS at 06:22

## 2019-05-08 RX ADMIN — TICAGRELOR 90 MG: 90 TABLET ORAL at 08:34

## 2019-05-08 RX ADMIN — SODIUM CHLORIDE, PRESERVATIVE FREE 3 ML: 5 INJECTION INTRAVENOUS at 21:32

## 2019-05-08 RX ADMIN — INSULIN GLARGINE 10 UNITS: 100 INJECTION, SOLUTION SUBCUTANEOUS at 21:33

## 2019-05-08 RX ADMIN — CARVEDILOL 25 MG: 25 TABLET, FILM COATED ORAL at 18:30

## 2019-05-08 RX ADMIN — VITAMIN D, TAB 1000IU (100/BT) 1000 UNITS: 25 TAB at 21:32

## 2019-05-08 RX ADMIN — INSULIN LISPRO 7 UNITS: 100 INJECTION, SOLUTION INTRAVENOUS; SUBCUTANEOUS at 11:26

## 2019-05-08 RX ADMIN — SODIUM CHLORIDE, PRESERVATIVE FREE 3 ML: 5 INJECTION INTRAVENOUS at 08:36

## 2019-05-08 RX ADMIN — TICAGRELOR 90 MG: 90 TABLET ORAL at 21:32

## 2019-05-08 RX ADMIN — LINAGLIPTIN 5 MG: 5 TABLET, FILM COATED ORAL at 08:34

## 2019-05-08 RX ADMIN — INSULIN LISPRO 5 UNITS: 100 INJECTION, SOLUTION INTRAVENOUS; SUBCUTANEOUS at 18:30

## 2019-05-08 RX ADMIN — Medication 10 MG: at 19:06

## 2019-05-08 RX ADMIN — AMLODIPINE BESYLATE 10 MG: 10 TABLET ORAL at 08:34

## 2019-05-08 NOTE — PLAN OF CARE
Problem: Patient Care Overview  Goal: Plan of Care Review  Outcome: Ongoing (interventions implemented as appropriate)   05/07/19 2021   Coping/Psychosocial   Plan of Care Reviewed With patient   Plan of Care Review   Progress improving   OTHER   Outcome Summary NIH 4. very limited mobility to LUE. Neuro aware. rec'd orders to initiate heparin gtt; strict bedrest; and goal -200. blood glucose level remained >120 throughout shift. CTM.

## 2019-05-08 NOTE — PROGRESS NOTES
Name: Rocky Logan Jr. ADMIT: 5/3/2019   : 1960  PCP: Karen Martinez APRN    MRN: [id number] LOS: 4 days   AGE/SEX: 58 y.o. male  ROOM: [facility]   Subjective   Chief Complaint   Patient presents with   • Extremity Weakness   still with left sided weakness- worsened 2 days ago. Minimally ability to lift left arm or leg  On antiplatelets and on heparin gtt  BG mostly in 200s    ROS  No f/c  No n/v  No cp/palp  No soa/cough  No facial droop or speech difficulties     Objective   Vital Signs  Temp:  [98 °F (36.7 °C)-98.9 °F (37.2 °C)] 98.6 °F (37 °C)  Heart Rate:  [69-76] 69  Resp:  [16-18] 18  BP: (144-183)/() 144/81  SpO2:  [95 %-98 %] 97 %  on   ;   Device (Oxygen Therapy): room air  Body mass index is 36.87 kg/m².    Physical Exam   Constitutional: He is oriented to person, place, and time. He appears well-developed and well-nourished. No distress.   HENT:   Head: Normocephalic and atraumatic.   Mouth/Throat: Oropharynx is clear and moist.   Eyes: Conjunctivae are normal. No scleral icterus.   Neck: Normal range of motion. Neck supple.   Cardiovascular: Normal rate, regular rhythm and normal heart sounds.   No murmur heard.  Pulmonary/Chest: Effort normal and breath sounds normal. No respiratory distress.   Abdominal: Soft. Bowel sounds are normal. There is no tenderness.   Genitourinary:   Genitourinary Comments: Deferred    Musculoskeletal: He exhibits no edema or deformity.   Neurological: He is alert and oriented to person, place, and time. No cranial nerve deficit.   Unable to lift LUE and LLE   Skin: Skin is warm and dry. He is not diaphoretic.   Psychiatric: He has a normal mood and affect. His behavior is normal. Thought content normal.   Nursing note and vitals reviewed.      Results Review:       I reviewed the patient's new clinical results.  Results from last 7 days   Lab Units 19  0636 19  0248 19  1310 19  0433   WBC 10*3/mm3 6.89 7.38 6.79 7.37   HEMOGLOBIN  g/dL 9.9* 10.5* 10.0* 10.2*   PLATELETS 10*3/mm3 130* 147 144 156     Results from last 7 days   Lab Units 05/05/19  0559 05/04/19  0433 05/03/19  1404   SODIUM mmol/L 144 144 145   POTASSIUM mmol/L 3.9 3.7 4.8   CHLORIDE mmol/L 111* 112* 111*   CO2 mmol/L 21.6* 22.1 22.8   BUN mg/dL 21* 23* 26*   CREATININE mg/dL 1.84* 2.05* 2.00*   GLUCOSE mg/dL 92 86 131*   Estimated Creatinine Clearance: 48.9 mL/min (A) (by C-G formula based on SCr of 1.84 mg/dL (H)).  Results from last 7 days   Lab Units 05/04/19  0433   ALBUMIN g/dL 3.20*   BILIRUBIN mg/dL 0.2   ALK PHOS U/L 94   AST (SGOT) U/L 17   ALT (SGPT) U/L 16     Results from last 7 days   Lab Units 05/05/19  0559 05/04/19  0433 05/03/19  1404   CALCIUM mg/dL 8.4* 8.3* 8.8   ALBUMIN g/dL  --  3.20*  --          Coag   Results from last 7 days   Lab Units 05/08/19  1035 05/08/19  0636 05/08/19  0248 05/07/19  2035 05/07/19  1310   INR   --   --   --   --  1.04   APTT seconds 61.3* 59.6* 46.3* 33.8 24.2     HbA1C   Lab Results   Component Value Date    HGBA1C 7.10 (H) 05/04/2019    HGBA1C 7.40 (H) 01/31/2019    HGBA1C 7.21 (H) 10/30/2018     Infection     Radiology(recent) Mri Brain Without Contrast    Result Date: 5/8/2019  1. Since MRI 3 days ago, 05/03/2019, there has been a slight interval increase in the ovoid acute lacunar infarct in the right anterior jordin. Previously on 05/03/2019 it measured 8.8 x 5.5 mm in anterior posterior and medial lateral dimension, now it measures 10 x 8 mm in anterior posterior, medial lateral dimension and has increased and extended slightly by several millimeters when compared to the MRI 3 days ago and could account for the increasing weakness. The remainder of the MRI of the brain is completely unchanged dating back to an MRI of the head 05/17/2017, two years ago. 2. This patient has had a previous 8 cm lateral right frontotemporal parietal craniotomy for resection of a right middle cranial fossa-sphenoid wing meningioma and there is  a large area of encephalomalacia extending from the anterior inferolateral right frontal lobe into the anterior and superior right temporal lobe, anterior right insular cortex, external capsule, putamen and anterior limb of the right internal capsule compatible with a large old right middle cerebral artery territory infarct measuring up to 10 x 6 x 5 cm in anterior posterior, medial lateral and cranial caudal dimension, unchanged. There is an abnormal area of lentiform extra-axial soft tissue in the anterior medial aspect of the right middle cranial fossa that maximally measures 16 x 7 mm in medial lateral and anterior posterior dimension, suspicious for an area of residual or recurrent meningioma that is unchanged since prior MRI 2 years ago 05/17/2017. The remainder of the MRI of the head is unremarkable.  The results were communicated to Ramses Carrasco MD by telephone 05/06/2019 at 4:30 PM.  This report was finalized on 5/8/2019 8:29 AM by Dr. Alvin Hutchison M.D.      No results found for: TROPONINT, TROPONINI, BNP  No components found for: TSH;2      amitriptyline 25 mg Oral Nightly   amLODIPine 10 mg Oral Q24H   aspirin 81 mg Oral Daily   atorvastatin 80 mg Oral Daily   carvedilol 25 mg Oral BID With Meals   cholecalciferol 1,000 Units Oral BID   folic acid 1 mg Oral Daily   insulin glargine 10 Units Subcutaneous Nightly   insulin glargine 15 Units Subcutaneous QAM   insulin lispro 0-14 Units Subcutaneous 4x Daily With Meals & Nightly   insulin lispro 7 Units Subcutaneous TID With Meals   levETIRAcetam 500 mg Oral Q12H   levothyroxine 50 mcg Oral Daily   linagliptin 5 mg Oral Daily   pantoprazole 40 mg Oral QAM   sodium chloride 3 mL Intravenous Q12H   ticagrelor 90 mg Oral BID       heparin (porcine) 12 Units/kg/hr Last Rate: 19.7 Units/kg/hr (05/08/19 1206)   sodium chloride 75 mL/hr Last Rate: 75 mL/hr (05/07/19 1923)   Diet Regular; Cardiac, Consistent Carbohydrate, Renal      Assessment/Plan      Active  Hospital Problems    Diagnosis  POA   • **Acute ischemic stroke (CMS/[facility]) [I63.9]  Yes   • Left leg weakness [R29.898]  Yes   • HTN (hypertension) [I10]  Yes   • GERD without esophagitis [K21.9]  Yes   • History of pontine stroke [Z86.73]  Not Applicable   • CKD (chronic kidney disease) stage 3, GFR 30-59 ml/min (CMS/[facility]) [N18.3]  Yes   • History of meningioma of the brain [Z86.011]  Not Applicable   • Subclinical hypothyroidism [E03.9]  Yes   • Type 2 diabetes mellitus with complication, with long-term current use of insulin (CMS/[facility]) [E11.8, Z79.4]  Not Applicable   • Vitamin D deficiency [E55.9]  Yes   • Hyperlipidemia [E78.5]  Yes      Resolved Hospital Problems   No resolved problems to display.       · ASA and Brillinta plus 48 hours of heparin gtt per Neurology with worsening of symptoms from Extension of right pontomedullary stroke secondary to small vessel thrombosis. High risk medications, continue close monitoring  · Still significant symptoms  · Monitor for arrhythmia on telemetery  · On insulin, monitor BG, decreas insulin with relatively low BG, will need to increase back as diet/BG increases  · Monitor renal fx, on gentle fluids  · Permissive HTN, may need to adjust BP meds here and at discharge  · PT/OT/ST    Will need acute rehab    DW family  DW RN  Reviewed previous records      Ramses Carrasco MD  [facility]  05/08/19  10:50 AM

## 2019-05-08 NOTE — NURSING NOTE
"Pt requests bed pan for bowel movement.  Pt placed on bedpan with no results by this nurse.  Pt continues to talk and not allow staff to leave room when using bedpan (1100).    Pt requests bed pan from nursing assistant.  Per CNA, pt placed on bedpan and would not let her and her trainee leave the room.  Pt states, \"Sometimes they have to dig it out of me.  Let me see how little your fingers are.\"  CNA left room at that point.  Bed in lowest position and call light placed within reach (1340).    Pt consistently asking staff where they live and in what neighborhood and personal questions about their life. Attempted to redirect multiple times.    Pt continues to ask to get out of bed.  Reminded patient multiple times that he is on strict bedrest.   "

## 2019-05-08 NOTE — PLAN OF CARE
Problem: Patient Care Overview  Goal: Plan of Care Review  Outcome: Ongoing (interventions implemented as appropriate)   05/08/19 1846   Coping/Psychosocial   Plan of Care Reviewed With patient   Plan of Care Review   Progress improving   OTHER   Outcome Summary Pt A&O. VSS. NIH 7. Heparin gtt titrated per protocol. Pt c/o constipation. Orders received for stool softener and suppository. Pt remains on strict bedrest.        Problem: Fall Risk (Adult)  Goal: Absence of Fall  Outcome: Ongoing (interventions implemented as appropriate)   05/08/19 1846   Fall Risk (Adult)   Absence of Fall making progress toward outcome       Problem: Stroke (Ischemic) (Adult)  Goal: Signs and Symptoms of Listed Potential Problems Will be Absent, Minimized or Managed (Stroke)  Outcome: Ongoing (interventions implemented as appropriate)   05/08/19 1846   Goal/Outcome Evaluation   Problems Assessed (Stroke (Ischemic)) all   Problems Assessed (Stroke (Ischemic)) situational response       Problem: Skin Injury Risk (Adult)  Goal: Skin Health and Integrity  Outcome: Ongoing (interventions implemented as appropriate)   05/08/19 1846   Skin Injury Risk (Adult)   Skin Health and Integrity making progress toward outcome

## 2019-05-08 NOTE — SIGNIFICANT NOTE
05/08/19 1158   Rehab Treatment   Discipline physical therapy assistant   Reason Treatment Not Performed unable to treat, medical status change  (per RN, pt still currently on bedrest per orders. Will f/u tomorrow. )   Recommendation   PT - Next Appointment 05/09/19

## 2019-05-08 NOTE — PLAN OF CARE
Problem: Patient Care Overview  Goal: Plan of Care Review  Outcome: Ongoing (interventions implemented as appropriate)   05/08/19 0434   Coping/Psychosocial   Plan of Care Reviewed With patient   Plan of Care Review   Progress improving   OTHER   Outcome Summary Alert and oriented. VSS. NIH 7. No falls. Heparin titrated mper orders. Will continue to monitor.     Goal: Individualization and Mutuality  Outcome: Ongoing (interventions implemented as appropriate)    Goal: Discharge Needs Assessment  Outcome: Ongoing (interventions implemented as appropriate)    Goal: Interprofessional Rounds/Family Conf  Outcome: Ongoing (interventions implemented as appropriate)      Problem: Fall Risk (Adult)  Goal: Absence of Fall  Outcome: Ongoing (interventions implemented as appropriate)      Problem: Stroke (Ischemic) (Adult)  Goal: Signs and Symptoms of Listed Potential Problems Will be Absent, Minimized or Managed (Stroke)  Outcome: Ongoing (interventions implemented as appropriate)      Problem: Skin Injury Risk (Adult)  Goal: Skin Health and Integrity  Outcome: Ongoing (interventions implemented as appropriate)

## 2019-05-08 NOTE — SIGNIFICANT NOTE
05/08/19 0809   Rehab Treatment   Discipline occupational therapist   Reason Treatment Not Performed other (see comments)  (pt on bed rest per orders in epic as of 5/7)   Recommendation   OT - Next Appointment 05/09/19

## 2019-05-08 NOTE — PROGRESS NOTES
DOS: 2019  NAME: Rocky Logan Jr.   : 1960  PCP: Karen Martinez APRN    Chief Complaint   Patient presents with   • Extremity Weakness        Stroke    Subjective: Pt seen in follow up, however the problem is new to me.  Remains with left upper extremity hemiparesis and left lower extremity weakness.  Able to lift left lower extremity off of the bed very briefly.  Denies any new stroke symptoms.  Remains on heparin drip.  Having issues with bowel movements.  Family at bedside.    Objective:  Vital signs:      Vitals:    19 2349 19 0419 19 0700 19 1300   BP: 151/74  180/93 144/81   BP Location: Right arm  Right arm Right arm   Patient Position: Lying  Lying Lying   Pulse: 72  71 69   Resp: 18   18   Temp: 98 °F (36.7 °C)  98.9 °F (37.2 °C) 98.6 °F (37 °C)   TempSrc: Oral  Oral Oral   SpO2: 96%  95% 97%   Weight:  104 kg (228 lb 4.8 oz)     Height:           Current Facility-Administered Medications:   •  acetaminophen (TYLENOL) tablet 650 mg, 650 mg, Oral, Q4H PRN **OR** acetaminophen (TYLENOL) suppository 650 mg, 650 mg, Rectal, Q4H PRN, Manohar Li MD  •  amitriptyline (ELAVIL) tablet 25 mg, 25 mg, Oral, Nightly, Manohar Li MD, 25 mg at 19  •  amLODIPine (NORVASC) tablet 10 mg, 10 mg, Oral, Q24H, Afshin Louie MD, 10 mg at 19  •  aspirin chewable tablet 81 mg, 81 mg, Oral, Daily, Gogo Lau APRN, 81 mg at 1934  •  atorvastatin (LIPITOR) tablet 80 mg, 80 mg, Oral, Daily, Manohar Li MD, 80 mg at 1934  •  carvedilol (COREG) tablet 25 mg, 25 mg, Oral, BID With Meals, Manohar Li MD, 25 mg at 1934  •  cholecalciferol (VITAMIN D3) tablet 1,000 Units, 1,000 Units, Oral, BID, Manohar Li MD, 1,000 Units at 19 0834  •  cyclobenzaprine (FLEXERIL) tablet 10 mg, 10 mg, Oral, Nightly PRN, Manohar Li MD  •  dextrose (D50W) 25 g/ 50mL Intravenous Solution 25 g, 25 g, Intravenous, Q15 Min PRN,  Manohar Li MD  •  dextrose (GLUTOSE) oral gel 15 g, 15 g, Oral, Q15 Min PRN, Manohar Li MD  •  folic acid (FOLVITE) tablet 1 mg, 1 mg, Oral, Daily, Afshin Louie MD, 1 mg at 05/08/19 0834  •  glucagon (human recombinant) (GLUCAGEN DIAGNOSTIC) injection 1 mg, 1 mg, Subcutaneous, PRN, Manohar Li MD  •  heparin 79040 units/250 mL (100 units/mL) in 0.45 % NaCl infusion, 12 Units/kg/hr, Intravenous, Titrated, Vic Kendall MD, Last Rate: 20.4 mL/hr at 05/08/19 1206, 19.7 Units/kg/hr at 05/08/19 1206  •  insulin glargine (LANTUS) injection 10 Units, 10 Units, Subcutaneous, Nightly, Omayra Finn APRN, 10 Units at 05/07/19 2207  •  insulin glargine (LANTUS) injection 15 Units, 15 Units, Subcutaneous, QAM, Omayra Finn APRN, 15 Units at 05/08/19 0622  •  insulin lispro (humaLOG) injection 0-14 Units, 0-14 Units, Subcutaneous, 4x Daily With Meals & Nightly, Manohar Li MD, 5 Units at 05/08/19 1126  •  insulin lispro (humaLOG) injection 5 Units, 5 Units, Subcutaneous, TID With Meals, Ramses Carrasco MD  •  levETIRAcetam (KEPPRA) tablet 500 mg, 500 mg, Oral, Q12H, Manohar Li MD, 500 mg at 05/08/19 0834  •  levothyroxine (SYNTHROID, LEVOTHROID) tablet 50 mcg, 50 mcg, Oral, Daily, Manohar Li MD, 50 mcg at 05/08/19 0834  •  linagliptin (TRADJENTA) tablet 5 mg, 5 mg, Oral, Daily, Manohar Li MD, 5 mg at 05/08/19 0834  •  ondansetron (ZOFRAN) tablet 4 mg, 4 mg, Oral, Q6H PRN **OR** ondansetron (ZOFRAN) injection 4 mg, 4 mg, Intravenous, Q6H PRN, Manohar Li MD  •  pantoprazole (PROTONIX) EC tablet 40 mg, 40 mg, Oral, QAM, Manohar Li MD, 40 mg at 05/08/19 0621  •  [COMPLETED] Insert peripheral IV, , , Once **AND** sodium chloride 0.9 % flush 10 mL, 10 mL, Intravenous, PRN, Oseas Obanod MD  •  sodium chloride 0.9 % flush 3 mL, 3 mL, Intravenous, Q12H, Manohar Li MD, 3 mL at 05/08/19 0836  •  sodium chloride 0.9 % flush 3-10 mL, 3-10 mL,  Intravenous, PRN, Manohar Li MD  •  sodium chloride 0.9 % infusion, 75 mL/hr, Intravenous, Continuous, Vic Kendall MD, Last Rate: 75 mL/hr at 05/07/19 1923, 75 mL/hr at 05/07/19 1923  •  ticagrelor (BRILINTA) tablet 90 mg, 90 mg, Oral, BID, Afshin Louie MD, 90 mg at 05/08/19 0834    PRN meds  •  acetaminophen **OR** acetaminophen  •  cyclobenzaprine  •  dextrose  •  dextrose  •  glucagon (human recombinant)  •  ondansetron **OR** ondansetron  •  [COMPLETED] Insert peripheral IV **AND** sodium chloride  •  sodium chloride    No current facility-administered medications on file prior to encounter.      Current Outpatient Medications on File Prior to Encounter   Medication Sig   • amitriptyline (ELAVIL) 25 MG tablet Take 25 mg by mouth Every Night.   • amLODIPine (NORVASC) 10 MG tablet Take 1 tablet by mouth Daily.   • aspirin-dipyridamole (AGGRENOX)  MG per 12 hr capsule Take 1 capsule by mouth 2 (Two) Times a Day.   • atorvastatin (LIPITOR) 80 MG tablet TAKE ONE TABLET BY MOUTH DAILY   • carbonyl iron (FEOSOL) 45 MG tablet tablet Take 1 tablet by mouth Every Night.   • carvedilol (COREG) 25 MG tablet Take 25 mg by mouth 2 (Two) Times a Day With Meals.   • cholecalciferol (VITAMIN D3) 1000 units tablet Take 1,000 Units by mouth 2 (Two) Times a Day.   • glucose blood (ACCU-CHEK MILENA) test strip To check to check 3 - 4 times   • glucose blood (ACCU-CHEK COMPACT PLUS) test strip To check 3 - 4 times a day   • glucose blood (FREESTYLE LITE) test strip USE TO TEST BLOOD SUGAR 4 TIMES DAILY ICD 10 CODE E11.9   • Insulin Lispro (HUMALOG KWIKPEN) 100 UNIT/ML solution pen-injector Inject 14 Units under the skin into the appropriate area as directed 3 (Three) Times a Day. 14 UNITS WITH BF 16 UNIT WITH LUNCH 18 UNITS WITH DINNER    THEN USES SLIDING SCALE ON TOP OF SCHEDULED DOSING   • Lancets (ACCU-CHEK SOFT TOUCH) lancets To check 3 - 4 times a day   • levETIRAcetam (KEPPRA) 500 MG tablet  Take 500 mg by mouth 2 (Two) Times a Day.   • levothyroxine (SYNTHROID, LEVOTHROID) 50 MCG tablet Take 1 tablet by mouth Daily.   • linagliptin (TRADJENTA) 5 MG tablet tablet Take 5 mg by mouth Daily.   • lisinopril (PRINIVIL,ZESTRIL) 40 MG tablet Take 1 tablet by mouth Daily.   • Multiple Vitamins-Minerals (MULTIVITAMIN ADULT PO) Take 1 tablet by mouth Daily.   • omega-3 acid ethyl esters (LOVAZA) 1 g capsule TAKE TWO CAPSULES BY MOUTH DAILY   • omeprazole (priLOSEC) 40 MG capsule Take 40 mg by mouth Daily.   • TOUJEO SOLOSTAR 300 UNIT/ML solution pen-injector INJECT 30 UNITS SUBCUTANEOUSLY EVERY MORNING AND 60 UNITS EVERY NIGHT AT BEDTIME   • TURMERIC PO Take 1 tablet by mouth 2 (Two) Times a Day.   • cyclobenzaprine (FLEXERIL) 10 MG tablet Take 10 mg by mouth At Night As Needed.   • glucosamine-chondroitin 500-400 MG capsule capsule Take 1 capsule by mouth Daily.       General appearance: NAD, alert and cooperative, well groomed  HEENT: Normocephalic, atraumatic, PERRL, no masses or tenderness  COR: RRR  Resp: Even and unlabored  Extremities: Equal pulses  Skin: warm, dry    Neurological:   MS: oriented x3, recent/remote memory intact, normal attention/concentration, language intact, left arm hemiparesis, normal fund of knowledge  CN: visual acuity grossly normal, right eye ptosis, visual fields full, PERRL, EOMI, facial sensation equal, no facial droop, hearing symmetric, palate elevates symmetrically, shoulder shrug decreased on left, tongue midline  Motor: 5/5 on right, 0/5 LUE, 1/5 LLE, was able to lift LLE off of bed for <2 seconds.  Reflexes: 1+ in all 4 ext.  Sensory: light touch sensation intact in all 4 ext.  Coordination: Normal finger to nose test on right     Laboratory results:  Lab Results   Component Value Date    TSH 3.700 10/30/2018     Lab Results   Component Value Date    HGBA1C 7.10 (H) 05/04/2019     Lab Results   Component Value Date    [id number] 597 05/07/2019     Lab Results   Component  Value Date    CHOL 175 05/04/2019    CHLPL 179 10/30/2018    CHLPL 214 (H) 07/24/2018    CHLPL 201 (H) 01/15/2018     Lab Results   Component Value Date    TRIG 211 (H) 05/04/2019    TRIG 133 10/30/2018    TRIG 78 07/24/2018     Lab Results   Component Value Date    HDL 25 (L) 05/04/2019    HDL 35 (L) 10/30/2018    HDL 45 07/24/2018     Lab Results   Component Value Date     (H) 05/04/2019     (H) 10/30/2018     (H) 07/24/2018     Lab Results   Component Value Date    WBC 6.89 05/08/2019    HGB 9.9 (L) 05/08/2019    HCT 28.8 (L) 05/08/2019    MCV 85.7 05/08/2019     (L) 05/08/2019     Lab Results   Component Value Date    GLUCOSE 92 05/05/2019    BUN 21 (H) 05/05/2019    CREATININE 1.84 (H) 05/05/2019    EGFRIFNONA 38 (L) 05/05/2019    EGFRIFAFRI 42 (L) 01/31/2019    BCR 11.4 05/05/2019    K 3.9 05/05/2019    CO2 21.6 (L) 05/05/2019    CALCIUM 8.4 (L) 05/05/2019    PROTENTOTREF 6.2 02/07/2018    ALBUMIN 3.20 (L) 05/04/2019    LABIL2 1.6 02/07/2018    AST 17 05/04/2019    ALT 16 05/04/2019     Lab Results   Component Value Date    PTT 61.3 (H) 05/08/2019     Lab Results   Component Value Date    INR 1.04 05/07/2019    PROTIME 13.3 05/07/2019     Brief Urine Lab Results     None          Review and interpretation of imaging:  Ct Head Without Contrast    Result Date: 5/5/2019  There is no evidence for acute intracranial abnormality or for interval change when compared to the prior head CT dated 05/03/2019. Stable evidence of a prior infarct involving the right frontal lobe and temporal lobe is noted. Evidence of a prior right craniotomy is also noted.  Radiation dose reduction techniques were utilized, including automated exposure control and exposure modulation based on body size.  This report was finalized on 5/5/2019 8:01 PM by Dr. Srini Mckeon M.D.      Ct Head Without Contrast    Result Date: 5/3/2019  1. Given the difference in modality, no significant change when compared to prior  MRI of the head [facility] 05/17/2017 with no acute abnormality seen on this head CT. 2. 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 There is a large area of encephalomalacia involving the inferior lateral right frontal lobe and the anterior and superior right temporal lobe with the area of encephalomalacia measuring 8 x 5 x 5.8 cm, unchanged.  There is a lentiform shaped area of soft tissue density in the anterior inferior medial aspect of the right middle cranial fossa measuring 14 x 9 mm in size, unchanged since MRI 05/17/2017, it could be scar.  A small focus of residual or recurrent meningioma that is stable since 05/17/2017 cannot be excluded. 3. Mild small vessel disease in the cerebral white matter and there are extensive calcified atherosclerotic plaques in the intracranial segments of the distal vertebral arteries and cavernous and supracavernous segments of the internal carotid arteries bilaterally.  The remainder of the head CT is unremarkable. If there remains any clinical suspicion of an acute stroke causing the patient's left-sided weakness, I recommend an MRI of the brain for more complete assessment.  The results and recommendations were communicated to Dr. Obando from the emergency room by telephone 05/03/2019 at 12:30 PM.  Radiation dose reduction techniques were utilized, including automated exposure control and exposure modulation based on body size.  This report was finalized on 5/3/2019 3:39 PM by Dr. Alvin Hutchison M.D.      Mri Angiogram Head Without Contrast    Result Date: 5/3/2019  1. There is a 9 mm area of restricted diffusion in the right medulla at its junction with the jordin that is consistent with an area of acute ischemia. 2. Normal MRA of the head and neck without contrast.  NASCET criteria were used in this interpretation. 3. There is stable  encephalomalacia in the right frontal and parietal lobes in the area of prior infarction associated with a prior craniotomy.  These findings were communicated to the stroke physician on-call, Dr. Winkler, on 05/03/2019 at 8:55 PM.  This report was finalized on 5/3/2019 9:01 PM by Dr. Srini Mckeon M.D.      Mri Angiogram Neck Without Contrast    Result Date: 5/3/2019  1. There is a 9 mm area of restricted diffusion in the right medulla at its junction with the jordin that is consistent with an area of acute ischemia. 2. Normal MRA of the head and neck without contrast.  NASCET criteria were used in this interpretation. 3. There is stable encephalomalacia in the right frontal and parietal lobes in the area of prior infarction associated with a prior craniotomy.  These findings were communicated to the stroke physician on-call, Dr. Winkler, on 05/03/2019 at 8:55 PM.  This report was finalized on 5/3/2019 9:01 PM by Dr. Srini Mckeon M.D.      Mri Brain Without Contrast    Result Date: 5/8/2019  1. Since MRI 3 days ago, 05/03/2019, there has been a slight interval increase in the ovoid acute lacunar infarct in the right anterior jordin. Previously on 05/03/2019 it measured 8.8 x 5.5 mm in anterior posterior and medial lateral dimension, now it measures 10 x 8 mm in anterior posterior, medial lateral dimension and has increased and extended slightly by several millimeters when compared to the MRI 3 days ago and could account for the increasing weakness. The remainder of the MRI of the brain is completely unchanged dating back to an MRI of the head 05/17/2017, two years ago. 2. This patient has had a previous 8 cm lateral right frontotemporal parietal craniotomy for resection of a right middle cranial fossa-sphenoid wing meningioma and there is a large area of encephalomalacia extending from the anterior inferolateral right frontal lobe into the anterior and superior right temporal lobe, anterior right insular cortex,  external capsule, putamen and anterior limb of the right internal capsule compatible with a large old right middle cerebral artery territory infarct measuring up to 10 x 6 x 5 cm in anterior posterior, medial lateral and cranial caudal dimension, unchanged. There is an abnormal area of lentiform extra-axial soft tissue in the anterior medial aspect of the right middle cranial fossa that maximally measures 16 x 7 mm in medial lateral and anterior posterior dimension, suspicious for an area of residual or recurrent meningioma that is unchanged since prior MRI 2 years ago 05/17/2017. The remainder of the MRI of the head is unremarkable.  The results were communicated to Ramses Carrasco MD by telephone 05/06/2019 at 4:30 PM.  This report was finalized on 5/8/2019 8:29 AM by Dr. Alvin Hutchison M.D.      Mri Brain Without Contrast    Result Date: 5/3/2019  1. There is a 9 mm area of restricted diffusion in the right medulla at its junction with the jordin that is consistent with an area of acute ischemia. 2. Normal MRA of the head and neck without contrast.  NASCET criteria were used in this interpretation. 3. There is stable encephalomalacia in the right frontal and parietal lobes in the area of prior infarction associated with a prior craniotomy.  These findings were communicated to the stroke physician on-call, Dr. Winkler, on 05/03/2019 at 8:55 PM.  This report was finalized on 5/3/2019 9:01 PM by Dr. Srini Mckeon M.D.      Fl Video Swallow    Result Date: 5/7/2019  Fluoroscopy was provided for the speech pathologist during a video swallow study. For full details please see the speech pathology report  This report was finalized on 5/7/2019 8:23 PM by Dr. Manohar Taylor M.D.      Results for orders placed during the hospital encounter of 05/03/19   Adult Transthoracic Echo Complete W/ Cont if Necessary Per Protocol (With Agitated Saline)    Narrative · Left ventricular systolic function is normal. Calculated EF = 64.0%.    Estimated EF was in agreement with the calculated EF. Normal left   ventricular cavity size noted. All left ventricular wall segments contract   normally.  · Left ventricular wall thickness is consistent with severe concentric   hypertrophy.  · Left ventricular diastolic dysfunction is noted (grade II w/high LAP)   consistent with pseudonormalization.  · There is mild calcification of the aortic valve.  · The mitral valve is abnormal in structure. MAC is present.  · No evidence of a patent foramen ovale. Saline test results are negative.          Impression/Assessment:  This is a 58-year-old male with past medical history of diabetes , hypertension, stroke,  benign meningioma status post right craniotomy for resection 10 years ago, diabetes, hypothyroidism, chronic kidney disease who presented to the ER on 5/3 with complaints of worsening left-sided weakness and difficulty walking.  He has mild residual left-sided weakness from prior stroke.  Blood pressure on arrival 169/87 with heart rate of 79.  2D echo with normal left atrium size noted, no PFO, mild calcification on aortic valve.  MRI brain revealed new right pontine stroke, MRA head and neck without significant stenosis.  On 5/4 we discontinued patient's Aggrenox and started aspirin 81 mg and Brilinta 60 mg twice daily.  We were reconsulted on 5/5 due to patient having worsening left-sided weakness and IV fluids were ordered and bed rest recommended.  CT head obtained at that time and did not show any new acute findings.  He was given a Brilinta load and a normal saline bolus for permissive hypertension.  He was also started on a heparin drip.  Dr. Louie discussed guarded prognosis with patient and family.     1.  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  Stroke is likely completed.  Denies any new right-sided weakness,  numbness, speech or visual disturbances.  He does have right eye ptosis that is chronic from previous resection.  Systolic blood pressure running 140s to 150s.  Goal systolic blood pressure 140-180.  Continue heparin drip, aspirin, and Brilinta.  Continue high-dose statin.  Continue IV fluids.  Encourage p.o. intake.  Will need aggressive risk factor control as well as aggressive therapy post discharge. Therapies as written. CCP for discharge planning. Call RRT for any acute neurological changes. We will continue to follow and advise.    Plan:  Continue Hep gtt  Continue ASA 81mg   Continue Brilinta 90mg BID  Lipitor 80mg,   Neurochecks per stroke protocol  Permissive hypertension, goal -180  Stroke Education  BUCK/SCDs  PT/OT/ST  Will follow    Case discussed with patient, patient mother, and Dr. Louie, and he agrees with plan above.   SHOBHA Batista    **Dante Disclaimer:**  Much of this encounter note is an electronic transcription/translation of spoken language to printed text. The electronic translation of spoken language may permit erroneous, or at times, nonsensical words or phrases to be inadvertently transcribed. Although I have reviewed the note for such errors, some may still exist.

## 2019-05-09 LAB
APTT PPP: 94.1 SECONDS (ref 22.7–35.4)
BASOPHILS # BLD AUTO: 0.04 10*3/MM3 (ref 0–0.2)
BASOPHILS NFR BLD AUTO: 0.5 % (ref 0–1.5)
DEPRECATED RDW RBC AUTO: 41.4 FL (ref 37–54)
EOSINOPHIL # BLD AUTO: 0.32 10*3/MM3 (ref 0–0.4)
EOSINOPHIL NFR BLD AUTO: 3.7 % (ref 0.3–6.2)
ERYTHROCYTE [DISTWIDTH] IN BLOOD BY AUTOMATED COUNT: 13.3 % (ref 12.3–15.4)
GLUCOSE BLDC GLUCOMTR-MCNC: 135 MG/DL (ref 70–130)
GLUCOSE BLDC GLUCOMTR-MCNC: 155 MG/DL (ref 70–130)
GLUCOSE BLDC GLUCOMTR-MCNC: 185 MG/DL (ref 70–130)
GLUCOSE BLDC GLUCOMTR-MCNC: 190 MG/DL (ref 70–130)
HCT VFR BLD AUTO: 29.2 % (ref 37.5–51)
HGB BLD-MCNC: 9.9 G/DL (ref 13–17.7)
IMM GRANULOCYTES # BLD AUTO: 0.04 10*3/MM3 (ref 0–0.05)
IMM GRANULOCYTES NFR BLD AUTO: 0.5 % (ref 0–0.5)
LYMPHOCYTES # BLD AUTO: 1.17 10*3/MM3 (ref 0.7–3.1)
LYMPHOCYTES NFR BLD AUTO: 13.4 % (ref 19.6–45.3)
MCH RBC QN AUTO: 29.6 PG (ref 26.6–33)
MCHC RBC AUTO-ENTMCNC: 33.9 G/DL (ref 31.5–35.7)
MCV RBC AUTO: 87.2 FL (ref 79–97)
MONOCYTES # BLD AUTO: 0.85 10*3/MM3 (ref 0.1–0.9)
MONOCYTES NFR BLD AUTO: 9.8 % (ref 5–12)
NEUTROPHILS # BLD AUTO: 6.29 10*3/MM3 (ref 1.7–7)
NEUTROPHILS NFR BLD AUTO: 72.1 % (ref 42.7–76)
NRBC BLD AUTO-RTO: 0 /100 WBC (ref 0–0.2)
PLATELET # BLD AUTO: 133 10*3/MM3 (ref 140–450)
PMV BLD AUTO: 11.1 FL (ref 6–12)
RBC # BLD AUTO: 3.35 10*6/MM3 (ref 4.14–5.8)
WBC NRBC COR # BLD: 8.71 10*3/MM3 (ref 3.4–10.8)

## 2019-05-09 PROCEDURE — 82962 GLUCOSE BLOOD TEST: CPT

## 2019-05-09 PROCEDURE — 63710000001 INSULIN GLARGINE PER 5 UNITS: Performed by: NURSE PRACTITIONER

## 2019-05-09 PROCEDURE — 63710000001 INSULIN LISPRO (HUMAN) PER 5 UNITS: Performed by: INTERNAL MEDICINE

## 2019-05-09 PROCEDURE — 85025 COMPLETE CBC W/AUTO DIFF WBC: CPT | Performed by: PSYCHIATRY & NEUROLOGY

## 2019-05-09 PROCEDURE — 97110 THERAPEUTIC EXERCISES: CPT

## 2019-05-09 PROCEDURE — 99232 SBSQ HOSP IP/OBS MODERATE 35: CPT | Performed by: NURSE PRACTITIONER

## 2019-05-09 PROCEDURE — 63710000001 INSULIN LISPRO (HUMAN) PER 5 UNITS: Performed by: HOSPITALIST

## 2019-05-09 PROCEDURE — 85730 THROMBOPLASTIN TIME PARTIAL: CPT | Performed by: PSYCHIATRY & NEUROLOGY

## 2019-05-09 PROCEDURE — 97530 THERAPEUTIC ACTIVITIES: CPT

## 2019-05-09 RX ORDER — BACLOFEN 10 MG/1
5 TABLET ORAL EVERY 12 HOURS SCHEDULED
Status: DISCONTINUED | OUTPATIENT
Start: 2019-05-09 | End: 2019-05-10 | Stop reason: HOSPADM

## 2019-05-09 RX ORDER — BACLOFEN 10 MG/1
5 TABLET ORAL 2 TIMES DAILY PRN
Status: DISCONTINUED | OUTPATIENT
Start: 2019-05-09 | End: 2019-05-09

## 2019-05-09 RX ORDER — CYCLOBENZAPRINE HCL 10 MG
10 TABLET ORAL 3 TIMES DAILY PRN
Status: DISCONTINUED | OUTPATIENT
Start: 2019-05-09 | End: 2019-05-09

## 2019-05-09 RX ADMIN — INSULIN GLARGINE 10 UNITS: 100 INJECTION, SOLUTION SUBCUTANEOUS at 22:11

## 2019-05-09 RX ADMIN — ASPIRIN 81 MG: 81 TABLET, CHEWABLE ORAL at 08:54

## 2019-05-09 RX ADMIN — INSULIN GLARGINE 15 UNITS: 100 INJECTION, SOLUTION SUBCUTANEOUS at 06:49

## 2019-05-09 RX ADMIN — LEVETIRACETAM 500 MG: 500 TABLET, FILM COATED ORAL at 20:11

## 2019-05-09 RX ADMIN — INSULIN LISPRO 5 UNITS: 100 INJECTION, SOLUTION INTRAVENOUS; SUBCUTANEOUS at 17:02

## 2019-05-09 RX ADMIN — LEVOTHYROXINE SODIUM 50 MCG: 50 TABLET ORAL at 08:54

## 2019-05-09 RX ADMIN — CARVEDILOL 25 MG: 25 TABLET, FILM COATED ORAL at 08:54

## 2019-05-09 RX ADMIN — PANTOPRAZOLE SODIUM 40 MG: 40 TABLET, DELAYED RELEASE ORAL at 06:49

## 2019-05-09 RX ADMIN — FOLIC ACID 1 MG: 1 TABLET ORAL at 08:54

## 2019-05-09 RX ADMIN — AMITRIPTYLINE HYDROCHLORIDE 25 MG: 25 TABLET, FILM COATED ORAL at 20:11

## 2019-05-09 RX ADMIN — BACLOFEN 5 MG: 10 TABLET ORAL at 20:10

## 2019-05-09 RX ADMIN — INSULIN LISPRO 5 UNITS: 100 INJECTION, SOLUTION INTRAVENOUS; SUBCUTANEOUS at 08:53

## 2019-05-09 RX ADMIN — INSULIN LISPRO 3 UNITS: 100 INJECTION, SOLUTION INTRAVENOUS; SUBCUTANEOUS at 17:02

## 2019-05-09 RX ADMIN — TICAGRELOR 90 MG: 90 TABLET ORAL at 08:54

## 2019-05-09 RX ADMIN — VITAMIN D, TAB 1000IU (100/BT) 1000 UNITS: 25 TAB at 20:11

## 2019-05-09 RX ADMIN — VITAMIN D, TAB 1000IU (100/BT) 1000 UNITS: 25 TAB at 08:54

## 2019-05-09 RX ADMIN — SODIUM CHLORIDE, PRESERVATIVE FREE 3 ML: 5 INJECTION INTRAVENOUS at 20:11

## 2019-05-09 RX ADMIN — CARVEDILOL 25 MG: 25 TABLET, FILM COATED ORAL at 17:02

## 2019-05-09 RX ADMIN — AMLODIPINE BESYLATE 10 MG: 10 TABLET ORAL at 08:54

## 2019-05-09 RX ADMIN — SODIUM CHLORIDE, PRESERVATIVE FREE 3 ML: 5 INJECTION INTRAVENOUS at 08:54

## 2019-05-09 RX ADMIN — SENNOSIDES,DOCUSATE SODIUM 2 TABLET: 50; 8.6 TABLET, FILM COATED ORAL at 20:10

## 2019-05-09 RX ADMIN — LINAGLIPTIN 5 MG: 5 TABLET, FILM COATED ORAL at 08:54

## 2019-05-09 RX ADMIN — BACLOFEN 5 MG: 10 TABLET ORAL at 11:36

## 2019-05-09 RX ADMIN — TICAGRELOR 90 MG: 90 TABLET ORAL at 20:11

## 2019-05-09 RX ADMIN — INSULIN LISPRO 3 UNITS: 100 INJECTION, SOLUTION INTRAVENOUS; SUBCUTANEOUS at 11:36

## 2019-05-09 RX ADMIN — ATORVASTATIN CALCIUM 80 MG: 80 TABLET, FILM COATED ORAL at 08:54

## 2019-05-09 RX ADMIN — INSULIN LISPRO 3 UNITS: 100 INJECTION, SOLUTION INTRAVENOUS; SUBCUTANEOUS at 22:10

## 2019-05-09 RX ADMIN — INSULIN LISPRO 5 UNITS: 100 INJECTION, SOLUTION INTRAVENOUS; SUBCUTANEOUS at 11:37

## 2019-05-09 RX ADMIN — LEVETIRACETAM 500 MG: 500 TABLET, FILM COATED ORAL at 08:54

## 2019-05-09 NOTE — PLAN OF CARE
Problem: Patient Care Overview  Goal: Plan of Care Review  Outcome: Ongoing (interventions implemented as appropriate)   05/09/19 0403   Coping/Psychosocial   Plan of Care Reviewed With patient   Plan of Care Review   Progress improving   OTHER   Outcome Summary Alert and oriented. VSS. No change in NIH score. Heparin titrated per protocol. Successful suppository., pt had bowel movement and recieved 2 stool softeners this shift. No falls. No c/o pain. Will continue to monitor.     Goal: Individualization and Mutuality  Outcome: Ongoing (interventions implemented as appropriate)    Goal: Discharge Needs Assessment  Outcome: Ongoing (interventions implemented as appropriate)    Goal: Interprofessional Rounds/Family Conf  Outcome: Ongoing (interventions implemented as appropriate)      Problem: Fall Risk (Adult)  Goal: Absence of Fall  Outcome: Ongoing (interventions implemented as appropriate)      Problem: Stroke (Ischemic) (Adult)  Goal: Signs and Symptoms of Listed Potential Problems Will be Absent, Minimized or Managed (Stroke)  Outcome: Ongoing (interventions implemented as appropriate)      Problem: Skin Injury Risk (Adult)  Goal: Skin Health and Integrity  Outcome: Ongoing (interventions implemented as appropriate)

## 2019-05-09 NOTE — THERAPY TREATMENT NOTE
"Acute Care - Occupational Therapy Progress Note  [facility]     Patient Name: Rocky Logan Jr.  : 1960  MRN: [id number]  Today's Date: 2019  Onset of Illness/Injury or Date of Surgery: 19  Date of Referral to OT: 19  Referring Physician: Dr Li    Admit Date: 5/3/2019       ICD-10-CM ICD-9-CM   1. Left leg weakness R29.898 729.89   2. Right pontine stroke (CMS/HCC) I63.50 434.91   3. Oropharyngeal dysphagia R13.12 787.22   4. Weakness generalized R53.1 780.79   5. Spasticity as late effect of cerebrovascular accident (CVA) R25.9 781.0     Patient Active Problem List   Diagnosis   • Stroke (CMS/HCC)   • Type 2 diabetes mellitus with complication, with long-term current use of insulin (CMS/HCC)   • Hyperlipidemia   • Elevated TSH   • Vitamin D deficiency   • Microalbuminuria   • Subclinical hypothyroidism   • Left leg weakness   • HTN (hypertension)   • GERD without esophagitis   • History of pontine stroke   • CKD (chronic kidney disease) stage 3, GFR 30-59 ml/min (CMS/HCC)   • History of meningioma of the brain   • Acute ischemic stroke (CMS/HCC)     Past Medical History:   Diagnosis Date   • Diabetes mellitus (CMS/HCC)    • Hypertension    • Stroke (CMS/HCC)      Past Surgical History:   Procedure Laterality Date   • CARDIAC SURGERY     • HERNIA REPAIR         Therapy Treatment    Rehabilitation Treatment Summary     Row Name 19 1330             Treatment Time/Intention    Discipline  occupational therapist pt now off bedrest.  [facility] rehab asking for expedited therapy.  -LE      Document Type  therapy note (daily note)  -LE      Subjective Information  complains of;weakness  -LE      Mode of Treatment  individual therapy;occupational therapy  -LE      Patient/Family Observations  -- \"I can have family bring my walker and cane\"  -LE      Care Plan Review  care plan/treatment goals reviewed  -LE      Care Plan Review, Other Participant(s)  father  -LE      Patient Effort  good  -LE   "    Existing Precautions/Restrictions  fall supported L UE in gait belt when standing  -LE      Treatment Considerations/Comments  -- impaired insight into deficits.   -LE      Equipment Issued to Patient  gait belt  -LE      Recorded by [LE] Yamilex Wilde, OTR 05/09/19 1345      Row Name 05/09/19 1330             Vital Signs    O2 Delivery Pre Treatment  room air  -LE      O2 Delivery Intra Treatment  room air  -LE      O2 Delivery Post Treatment  room air  -LE      Pre Patient Position  Supine  -LE      Intra Patient Position  Standing  -LE      Post Patient Position  Supine  -LE      Activity Duration  -- fatigue and L LE weak with standing EOB  -LE      Recorded by [LE] Yamilex Wilde, OTR 05/09/19 1345      Row Name 05/09/19 1330             Cognitive Assessment/Intervention    Additional Documentation  Cognitive Assessment/Intervention (Group)  -LE      Recorded by [LE] Yamilex Wilde, OTR 05/09/19 1345      Row Name 05/09/19 1330             Cognitive Assessment/Intervention- PT/OT    Orientation Status (Cognition)  oriented to;person;place;situation  -LE      Follows Commands (Cognition)  physical/tactile prompts required;repetition of directions required;follows one step commands;75-90% accuracy  -LE      Safety Deficit (Cognitive)  mild deficit;problem solving;judgment;insight into deficits/self awareness;impulsivity;awareness of need for assistance  -LE      Personal Safety Interventions  fall prevention program maintained;gait belt;nonskid shoes/slippers when out of bed  -LE      Recorded by [LE] Yamilex Wilde, OTR 05/09/19 1345      Row Name 05/09/19 1330             Bed Mobility Assessment/Treatment    Bed Mobility Assessment/Treatment  rolling right  -LE      Rolling Right [place] (Bed Mobility)  maximum assist (25% patient effort)  -LE      Supine-Sit [place] (Bed Mobility)  maximum assist (25% patient effort)  -LE      Sit-Supine [place] (Bed Mobility)  maximum assist (25% patient effort)   -LE      Bed Mobility, Safety Issues  decreased use of arms for pushing/pulling;decreased use of legs for bridging/pushing;impaired trunk control for bed mobility  -LE      Assistive Device (Bed Mobility)  bed rails;head of bed elevated;draw sheet  -LE      Comment (Bed Mobility)  use log roll tech.  assist to move L LE and UE during mobility  -LE      Recorded by [LE] Yamilex Wilde, OTR 05/09/19 1345      Row Name 05/09/19 1330             Functional Mobility    Functional Mobility- Ind. Level  unable to perform;not tested sidestep along EOB with Max.  impaired control L LE  -LE      Recorded by [LE] Yamilex Wilde, OTR 05/09/19 1345      Row Name 05/09/19 1330             Sit-Stand Transfer    Sit-Stand [place] (Transfers)  maximum assist (25% patient effort)  -LE      Assistive Device (Sit-Stand Transfers)  -- hold chair back w/ R hand.  OT close, ready to block L knee  -LE      Recorded by [LE] Yamilex Wilde, OTR 05/09/19 1345      Row Name 05/09/19 1330             Stand-Sit Transfer    Stand-Sit [place] (Transfers)  maximum assist (25% patient effort)  -LE      Recorded by [LE] Yamilex Wilde, OTR 05/09/19 1345      Row Name 05/09/19 1330             ADL Assessment/Intervention    BADL Assessment/Intervention  bathing;upper body dressing;lower body dressing;grooming;feeding;toileting  -LE      Recorded by [LE] Yamilex Wilde, OTR 05/09/19 1345      Row Name 05/09/19 1330             Upper Body Dressing Assessment/Training    Comment (Upper Body Dressing)  trace L UE and chronic change R UE limits ability to complete, anticipate max A  -LE      Recorded by [LE] Yamilex Wilde, OTR 05/09/19 1345      Row Name 05/09/19 1330             Lower Body Dressing Assessment/Training    Lower Body Dressing Garrett Level  dependent (less than 25% patient effort)  -LE      Comment (Lower Body Dressing)  -- unable attempt reach feet while EOB.  unable stand unsupport  -LE      Recorded by [LE] Yamilex Wilde, OTR 05/09/19  1345      Row Name 05/09/19 1330             Self-Feeding Assessment/Training    [place] Level (Feeding)  set up using R hand.  at baseline uses L hand.   -LE      Position (Self-Feeding)  sitting up in bed OT positions pt and tray   -LE      Recorded by [LE] Yamilex Wilde, OTR 05/09/19 1345      Row Name 05/09/19 1330             Toileting Assessment/Training    [place] Level (Toileting)  maximum assist (25% patient effort);dependent (less than 25% patient effort)  -LE      Comment (Toileting)  -- urinal and bedpan with nsg assist.   -LE      Recorded by [LE] Yamilex Wilde, OTR 05/09/19 1345      Row Name 05/09/19 1330             BADL Safety/Performance    Impairments, BADL Safety/Performance  balance;grasp/prehension;coordination;range of motion;strength  -LE      Recorded by [LE] Yamilex Wilde, OTR 05/09/19 1345      Row Name 05/09/19 1330             General ROM    RT Upper Ext  Comments  -LE      LT Upper Ext  Comment  -LE      Recorded by [LE] Yamilex Wilde, OTR 05/09/19 1345      Row Name 05/09/19 1330             Left Upper Ext    Lt Upper Extremity Comments   visible shld shrug.  trace index finger.  no other AROM noted  -LE      Recorded by [LE] Yamilex Wilde, OTR 05/09/19 1345      Row Name 05/09/19 1330             Balance    Balance  static standing balance  -LE      Recorded by [LE] Yamilex Wilde, OTR 05/09/19 1345      Row Name 05/09/19 1330             Static Sitting Balance    Level of [place] (Unsupported Sitting, Static Balance)  contact guard assist  -LE      Sitting Position (Unsupported Sitting, Static Balance)  sitting on edge of bed  -LE      Time Able to Maintain Position (Unsupported Sitting, Static Balance)  4 to 5 minutes  -LE      Recorded by [LE] Yamilex Wlide, OTR 05/09/19 1345      Row Name 05/09/19 1330             Static Standing Balance    Level of [place] (Supported Standing, Static Balance)  maximal assist, 25 to 49% patient effort  -LE      Time Able to Maintain  Position (Supported Standing, Static Balance)  30 to 45 seconds  -LE      Assistive Device Utilized (Supported Standing, Static Balance)  -- OT support pt with gait belt. pt holds chair back/bed rail  -LE      Recorded by [LE] Yamilex Wilde, OTR 05/09/19 1345      Row Name 05/09/19 8890             Positioning and Restraints    Pre-Treatment Position  in bed  -LE      Post Treatment Position  bed  -LE      In Bed  notified nsg;fowlers;call light within reach;encouraged to call for assist;exit alarm on;with family/caregiver;SCD pump applied;heels elevated  -LE      Recorded by [LE] Yamilex Wilde, OTR 05/09/19 1345      Row Name 05/09/19 1330             Pain Scale: Numbers Pre/Post-Treatment    Pain Scale: Numbers, Pretreatment  0/10 - no pain  -LE      Recorded by [LE] Yamilex Wilde, OTR 05/09/19 1341        User Key  (r) = Recorded By, (t) = Taken By, (c) = Cosigned By    Initials Name Effective Dates Discipline    LE Yamilex Wilde, OTR 06/08/18 -  OT           Rehab Goal Summary     Row Name 05/09/19 1300             Transfer Goal 1 (OT)    Activity/Assistive Device (Transfer Goal 1, OT)  sit-to-stand/stand-to-sit;bed-to-chair/chair-to-bed;commode, 3-in-1  -LE      [place] Level/Cues Needed (Transfer Goal 1, OT)  maximum assist (25-49% patient effort)  -LE      Time Frame (Transfer Goal 1, OT)  1 week  -LE      Progress/Outcome (Transfer Goal 1, OT)  goal ongoing  -LE         Dressing Goal 1 (OT)    Activity/Assistive Device (Dressing Goal 1, OT)  upper body dressing  -LE      [place]/Cues Needed (Dressing Goal 1, OT)  moderate assist (50-74% patient effort)  -LE      Time Frame (Dressing Goal 1, OT)  1 week  -LE      Progress/Outcome (Dressing Goal 1, OT)  goal ongoing  -LE         Toileting Goal 1 (OT)    Activity/Device (Toileting Goal 1, OT)  perform perineal hygiene;commode, 3-in-1  -LE      [place] Level/Cues Needed (Toileting Goal 1, OT)  moderate assist (50-74% patient effort)  -LE      Time  Frame (Toileting Goal 1, OT)  1 week  -LE      Progress/Outcome (Toileting Goal 1, OT)  goal ongoing  -LE         ROM Goal 1 (OT)    ROM Goal 1 (OT)  10 reps available A/AROM L UE for increase passive assist w/ ADL  -LE      Time Frame (ROM Goal 1, OT)  1 week  -LE      Progress/Outcome (ROM Goal 1, OT)  goal ongoing  -LE        User Key  (r) = Recorded By, (t) = Taken By, (c) = Cosigned By    Initials Name Provider Type Discipline    Yamilex Tay OTR Occupational Therapist OT        Occupational Therapy Education     Title: PT OT SLP Therapies (In Progress)     Topic: Occupational Therapy (Done)     Point: ADL training (Done)     Description: Instruct learner(s) on proper safety adaptation and remediation techniques during self care or transfers.   Instruct in proper use of assistive devices.    Learning Progress Summary           Patient Acceptance, E, VU by SK at 5/6/2019  9:35 AM    Comment:  educate pt on bed mobility, LUE AAROM/PROM ex and importance                   Point: Home exercise program (Done)     Description: Instruct learner(s) on appropriate technique for monitoring, assisting and/or progressing therapeutic exercises/activities.    Learning Progress Summary           Patient Acceptance, E, VU by SK at 5/6/2019  9:35 AM    Comment:  educate pt on bed mobility, LUE AAROM/PROM ex and importance                   Point: Precautions (Done)     Description: Instruct learner(s) on prescribed precautions during self-care and functional transfers.    Learning Progress Summary           Patient Acceptance, E,D, VU by ROBERT at 5/9/2019  1:33 PM    Comment:  Ed role of OT, ed positioning of L UE to avoid strain and prevent subluxation at shld joint.  Ed need for assist to get up.   Family Acceptance, E,D, VU by ROBERT at 5/9/2019  1:33 PM    Comment:  Ed role of OT, ed positioning of L UE to avoid strain and prevent subluxation at shld joint.  Ed need for assist to get up.                   Point: Body mechanics  (Done)     Description: Instruct learner(s) on proper positioning and spine alignment during self-care, functional mobility activities and/or exercises.    Learning Progress Summary           Patient Acceptance, E,D, VU by ROBERT at 5/9/2019  1:33 PM    Comment:  Ed role of OT, ed positioning of L UE to avoid strain and prevent subluxation at shld joint.  Ed need for assist to get up.   Family Acceptance, E,D, VU by ROBERT at 5/9/2019  1:33 PM    Comment:  Ed role of OT, ed positioning of L UE to avoid strain and prevent subluxation at shld joint.  Ed need for assist to get up.                               User Key     Initials Effective Dates Name Provider Type Discipline    LE 06/08/18 -  Yamilex Wilde OTR Occupational Therapist OT    SK 02/25/19 -  Brandi Bustos OT Occupational Therapist OT                OT Recommendation and Plan     Plan of Care Review  Plan of Care Reviewed With: patient  Plan of Care Reviewed With: patient  Outcome Summary: Pt now off bedrest.  Pt with trace L shld shrug, index finger.  No other AROM noted L UE.  Impaired R UE due to h/o Erbs Palsy.  Pt is assist of 1 to sit EOB and Max A to stand EOB.  Pt with poor insight into deficits.  Pt may benefit from skillled OT to increase safety, independence and UE function.   Outcome Measures     Row Name 05/09/19 1327 05/09/19 1300          How much help from another is currently needed...    Putting on and taking off regular lower body clothing?  1  -LE  --     Bathing (including washing, rinsing, and drying)  1  -LE  --     Toileting (which includes using toilet bed pan or urinal)  1  -LE  --     Putting on and taking off regular upper body clothing  2  -LE  --     Taking care of personal grooming (such as brushing teeth)  3  -LE  --     Eating meals  3  -LE  --     Score  11  -LE  --        Functional Assessment    Outcome Measure Options  --  AM-PAC 6 Clicks Daily Activity (OT)  -LE       User Key  (r) = Recorded By, (t) = Taken By, (c) =  Cosigned By    Initials Name Provider Type    Yamilex Tay OTR Occupational Therapist           Time Calculation:   Time Calculation- OT     Row Name 05/09/19 1345 05/09/19 0818          Time Calculation- OT    OT Start Time  1301  -LE  --     OT Stop Time  1326  -LE  --     OT Time Calculation (min)  25 min  -LE  --     Total Timed Code Minutes- OT  25 minute(s)  -LE  --     OT Received On  05/09/19  -LE  --     OT - Next Appointment  --  05/10/19  -LE     OT Goal Re-Cert Due Date  05/16/19  -ROBERT  --       User Key  (r) = Recorded By, (t) = Taken By, (c) = Cosigned By    Initials Name Provider Type    Yamilex Tay OTR Occupational Therapist        Therapy Charges for Today     Code Description Service Date Service Provider Modifiers Qty    [id number]  OT THERAPEUTIC ACT EA 15 MIN 5/9/2019 Yamilex Wilde OTR GO 2               ALEX Mccann  5/9/2019

## 2019-05-09 NOTE — SIGNIFICANT NOTE
05/09/19 0818   Rehab Treatment   Discipline occupational therapist   Reason Treatment Not Performed unable to treat, medical status change  (pt continues on bedrest per RN and orders.  Will hold OT while on bedrest.)   Recommendation   OT - Next Appointment 05/10/19

## 2019-05-09 NOTE — PROGRESS NOTES
LOS: 5 days     Name: Rocky Logan Jr.  Age/Sex: 58 y.o. male  :  1960        PCP: Karen Martinez APRN    Subjective   Feeling ok today denies new neuro symptoms today, Wants to get home so he can get his corn planted  General: No Fever or Chills, Cardiac: No Chest Pain or Palpitations, Resp: No Cough or SOA, GI: No Nausea, Vomiting, or Diarrhea and Other: No bleeding      amitriptyline 25 mg Oral Nightly   amLODIPine 10 mg Oral Q24H   aspirin 81 mg Oral Daily   atorvastatin 80 mg Oral Daily   carvedilol 25 mg Oral BID With Meals   cholecalciferol 1,000 Units Oral BID   folic acid 1 mg Oral Daily   insulin glargine 10 Units Subcutaneous Nightly   insulin glargine 15 Units Subcutaneous QAM   insulin lispro 0-14 Units Subcutaneous 4x Daily With Meals & Nightly   insulin lispro 5 Units Subcutaneous TID With Meals   levETIRAcetam 500 mg Oral Q12H   levothyroxine 50 mcg Oral Daily   linagliptin 5 mg Oral Daily   pantoprazole 40 mg Oral QAM   sennosides-docusate sodium 2 tablet Oral Nightly   sodium chloride 3 mL Intravenous Q12H   ticagrelor 90 mg Oral BID          Objective   Vital Signs  Temp:  [98.1 °F (36.7 °C)-98.6 °F (37 °C)] 98.1 °F (36.7 °C)  Heart Rate:  [68-78] 68  Resp:  [16-18] 16  BP: (144-174)/(81-88) 174/88  Body mass index is 38.43 kg/m².    Intake/Output Summary (Last 24 hours) at 2019 1157  Last data filed at 2019 1011  Gross per 24 hour   Intake 425 ml   Output 2226 ml   Net -1801 ml       Physical Exam  Constitutional: He is oriented to person, place, and time. He appears well-developed and well-nourished. No distress.   HENT:   Head: Normocephalic and atraumatic.   Mouth/Throat: Oropharynx is clear and moist.   Eyes: Conjunctivae are normal. No scleral icterus.   Neck: Normal range of motion. Neck supple.   Cardiovascular: Normal rate, regular rhythm and normal heart sounds.   No murmur heard.  Pulmonary/Chest: Effort normal and breath sounds normal. No respiratory distress.    Abdominal: Soft. Bowel sounds are normal. There is no tenderness.   Genitourinary:   Genitourinary Comments: Deferred    Musculoskeletal: He exhibits no edema or deformity.   Neurological: He is alert and oriented to person, place, and time. No cranial nerve deficit.   Unable to lift LUE and LLE   Skin: Skin is warm and dry. He is not diaphoretic.   Psychiatric: He has a normal mood and affect. His behavior is normal. Thought content normal.   Nursing note and vitals reviewed.    Results Review:       I reviewed the patient's new clinical results.  Results from last 7 days   Lab Units 05/09/19  0431 05/08/19  0636 05/08/19  0248 05/07/19  1310 05/04/19  0433 05/03/19  1331   WBC 10*3/mm3 8.71 6.89 7.38 6.79 7.37 8.10   HEMOGLOBIN g/dL 9.9* 9.9* 10.5* 10.0* 10.2* 11.7*   PLATELETS 10*3/mm3 133* 130* 147 144 156 168     Results from last 7 days   Lab Units 05/05/19  0559 05/04/19  0433 05/03/19  1404   SODIUM mmol/L 144 144 145   POTASSIUM mmol/L 3.9 3.7 4.8   CHLORIDE mmol/L 111* 112* 111*   CO2 mmol/L 21.6* 22.1 22.8   BUN mg/dL 21* 23* 26*   CREATININE mg/dL 1.84* 2.05* 2.00*   CALCIUM mg/dL 8.4* 8.3* 8.8   Estimated Creatinine Clearance: 49.8 mL/min (A) (by C-G formula based on SCr of 1.84 mg/dL (H)).  Lab Results   Component Value Date    HGBA1C 7.10 (H) 05/04/2019    HGBA1C 7.40 (H) 01/31/2019    HGBA1C 7.21 (H) 10/30/2018     Glucose   Date/Time Value Ref Range Status   05/09/2019 1132 155 (H) 70 - 130 mg/dL Final   05/09/2019 0622 135 (H) 70 - 130 mg/dL Final   05/08/2019 2106 169 (H) 70 - 130 mg/dL Final   05/08/2019 1557 110 70 - 130 mg/dL Final   05/08/2019 1054 217 (H) 70 - 130 mg/dL Final   05/08/2019 0601 175 (H) 70 - 130 mg/dL Final   05/07/2019 2136 236 (H) 70 - 130 mg/dL Final   05/07/2019 1610 121 70 - 130 mg/dL Final         Assessment/Plan     Acute ischemic stroke (CMS/HCC)    Type 2 diabetes mellitus with complication, with long-term current use of insulin (CMS/[facility])    Hyperlipidemia    Vitamin  D deficiency    Subclinical hypothyroidism    Left leg weakness    HTN (hypertension)    GERD without esophagitis    History of pontine stroke    CKD (chronic kidney disease) stage 3, GFR 30-59 ml/min (CMS/HCC)    History of meningioma of the brain      PLAN  - off bedrest today  - PT OT to evaluate  - now off heparin drip  - labs remains stable  - blood sugars are acceptable    Disposition  To acute rehab when precert obtained      Jero Rodrigues MD  [facility]  05/09/19  11:57 AM

## 2019-05-09 NOTE — PROGRESS NOTES
DOS: 2019  NAME: Rocky Logan Jr.   : 1960  PCP: Karen Martinez APRN    Chief Complaint   Patient presents with   • Extremity Weakness        Stroke    Subjective: No acute events overnight. Remains with left-sided hemiparesis.  Able to move his left hand index finger and middle finger.  Very spastic in his left lower extremity and left upper extremity.  Heparin drip stopped.  Denies any new weakness, numbness, speech or visual disturbances, or headaches.  Family at bedside.    Objective:  Vital signs:      Vitals:    197 19 2331 19 0500 19 0713   BP: 160/88 171/85  174/88   BP Location: Right arm Left arm  Left arm   Patient Position: Lying Lying  Lying   Pulse: 78 77  68   Resp: 18 18  16   Temp: 98.1 °F (36.7 °C) 98.1 °F (36.7 °C)  98.1 °F (36.7 °C)   TempSrc: Oral Oral  Oral   SpO2: 97% 96%     Weight:   108 kg (238 lb)    Height:           Current Facility-Administered Medications:   •  acetaminophen (TYLENOL) tablet 650 mg, 650 mg, Oral, Q4H PRN **OR** acetaminophen (TYLENOL) suppository 650 mg, 650 mg, Rectal, Q4H PRN, Manohar Li MD  •  amitriptyline (ELAVIL) tablet 25 mg, 25 mg, Oral, Nightly, Manohar Li MD, 25 mg at 192  •  amLODIPine (NORVASC) tablet 10 mg, 10 mg, Oral, Q24H, Afshin Louie MD, 10 mg at 19 0854  •  aspirin chewable tablet 81 mg, 81 mg, Oral, Daily, Gogo Lau APRN, 81 mg at 19 0854  •  atorvastatin (LIPITOR) tablet 80 mg, 80 mg, Oral, Daily, Manohar Li MD, 80 mg at 19 0854  •  baclofen (LIORESAL) tablet 5 mg, 5 mg, Oral, BID PRN, Afshin Louie MD  •  bisacodyl (DULCOLAX) suppository 10 mg, 10 mg, Rectal, Daily PRN, Ramses Carrasco MD, 10 mg at 19 1906  •  carvedilol (COREG) tablet 25 mg, 25 mg, Oral, BID With Meals, Manohar Li MD, 25 mg at 19 0854  •  cholecalciferol (VITAMIN D3) tablet 1,000 Units, 1,000 Units, Oral, BID, Manohar Li MD, 1,000 Units at  05/09/19 0854  •  dextrose (D50W) 25 g/ 50mL Intravenous Solution 25 g, 25 g, Intravenous, Q15 Min PRN, Manohar Li MD  •  dextrose (GLUTOSE) oral gel 15 g, 15 g, Oral, Q15 Min PRN, Manohar Li MD  •  folic acid (FOLVITE) tablet 1 mg, 1 mg, Oral, Daily, Afshin Louie MD, 1 mg at 05/09/19 0854  •  glucagon (human recombinant) (GLUCAGEN DIAGNOSTIC) injection 1 mg, 1 mg, Subcutaneous, PRN, Manohar Li MD  •  insulin glargine (LANTUS) injection 10 Units, 10 Units, Subcutaneous, Nightly, Omayra Finn APRN, 10 Units at 05/08/19 2133  •  insulin glargine (LANTUS) injection 15 Units, 15 Units, Subcutaneous, QAM, Omayra Finn APRN, 15 Units at 05/09/19 0649  •  insulin lispro (humaLOG) injection 0-14 Units, 0-14 Units, Subcutaneous, 4x Daily With Meals & Nightly, Manohar Li MD, 3 Units at 05/08/19 2132  •  insulin lispro (humaLOG) injection 5 Units, 5 Units, Subcutaneous, TID With Meals, Ramses Carrasco MD, 5 Units at 05/09/19 0853  •  levETIRAcetam (KEPPRA) tablet 500 mg, 500 mg, Oral, Q12H, Manohar Li MD, 500 mg at 05/09/19 0854  •  levothyroxine (SYNTHROID, LEVOTHROID) tablet 50 mcg, 50 mcg, Oral, Daily, Manohar Li MD, 50 mcg at 05/09/19 0854  •  linagliptin (TRADJENTA) tablet 5 mg, 5 mg, Oral, Daily, Manohar Li MD, 5 mg at 05/09/19 0854  •  ondansetron (ZOFRAN) tablet 4 mg, 4 mg, Oral, Q6H PRN **OR** ondansetron (ZOFRAN) injection 4 mg, 4 mg, Intravenous, Q6H PRN, Manohar Li MD  •  pantoprazole (PROTONIX) EC tablet 40 mg, 40 mg, Oral, QAM, Manohar Li MD, 40 mg at 05/09/19 0649  •  sennosides-docusate sodium (SENOKOT-S) 8.6-50 MG tablet 2 tablet, 2 tablet, Oral, Nightly, Ramses Carrasco MD, 2 tablet at 05/08/19 2131  •  [COMPLETED] Insert peripheral IV, , , Once **AND** sodium chloride 0.9 % flush 10 mL, 10 mL, Intravenous, PRN, Oseas Obando MD  •  sodium chloride 0.9 % flush 3 mL, 3 mL, Intravenous, Q12H, Manohar Li MD, 3 mL at 05/09/19  0854  •  sodium chloride 0.9 % flush 3-10 mL, 3-10 mL, Intravenous, PRN, Manohar Li MD  •  ticagrelor (BRILINTA) tablet 90 mg, 90 mg, Oral, BID, Afshin Louie MD, 90 mg at 05/09/19 0854    PRN meds  •  acetaminophen **OR** acetaminophen  •  baclofen  •  bisacodyl  •  dextrose  •  dextrose  •  glucagon (human recombinant)  •  ondansetron **OR** ondansetron  •  [COMPLETED] Insert peripheral IV **AND** sodium chloride  •  sodium chloride    No current facility-administered medications on file prior to encounter.      Current Outpatient Medications on File Prior to Encounter   Medication Sig   • amitriptyline (ELAVIL) 25 MG tablet Take 25 mg by mouth Every Night.   • amLODIPine (NORVASC) 10 MG tablet Take 1 tablet by mouth Daily.   • aspirin-dipyridamole (AGGRENOX)  MG per 12 hr capsule Take 1 capsule by mouth 2 (Two) Times a Day.   • atorvastatin (LIPITOR) 80 MG tablet TAKE ONE TABLET BY MOUTH DAILY   • carbonyl iron (FEOSOL) 45 MG tablet tablet Take 1 tablet by mouth Every Night.   • carvedilol (COREG) 25 MG tablet Take 25 mg by mouth 2 (Two) Times a Day With Meals.   • cholecalciferol (VITAMIN D3) 1000 units tablet Take 1,000 Units by mouth 2 (Two) Times a Day.   • glucose blood (ACCU-CHEK MILENA) test strip To check to check 3 - 4 times   • glucose blood (ACCU-CHEK COMPACT PLUS) test strip To check 3 - 4 times a day   • glucose blood (FREESTYLE LITE) test strip USE TO TEST BLOOD SUGAR 4 TIMES DAILY ICD 10 CODE E11.9   • Insulin Lispro (HUMALOG KWIKPEN) 100 UNIT/ML solution pen-injector Inject 14 Units under the skin into the appropriate area as directed 3 (Three) Times a Day. 14 UNITS WITH BF 16 UNIT WITH LUNCH 18 UNITS WITH DINNER    THEN USES SLIDING SCALE ON TOP OF SCHEDULED DOSING   • Lancets (ACCU-CHEK SOFT TOUCH) lancets To check 3 - 4 times a day   • levETIRAcetam (KEPPRA) 500 MG tablet Take 500 mg by mouth 2 (Two) Times a Day.   • levothyroxine (SYNTHROID, LEVOTHROID) 50 MCG tablet Take 1  tablet by mouth Daily.   • linagliptin (TRADJENTA) 5 MG tablet tablet Take 5 mg by mouth Daily.   • lisinopril (PRINIVIL,ZESTRIL) 40 MG tablet Take 1 tablet by mouth Daily.   • Multiple Vitamins-Minerals (MULTIVITAMIN ADULT PO) Take 1 tablet by mouth Daily.   • omega-3 acid ethyl esters (LOVAZA) 1 g capsule TAKE TWO CAPSULES BY MOUTH DAILY   • omeprazole (priLOSEC) 40 MG capsule Take 40 mg by mouth Daily.   • TOUJEO SOLOSTAR 300 UNIT/ML solution pen-injector INJECT 30 UNITS SUBCUTANEOUSLY EVERY MORNING AND 60 UNITS EVERY NIGHT AT BEDTIME   • TURMERIC PO Take 1 tablet by mouth 2 (Two) Times a Day.   • cyclobenzaprine (FLEXERIL) 10 MG tablet Take 10 mg by mouth At Night As Needed.   • glucosamine-chondroitin 500-400 MG capsule capsule Take 1 capsule by mouth Daily.       General appearance: NAD, alert and cooperative, well groomed  HEENT: Normocephalic, atraumatic, PERRL, no masses or tenderness  COR: RRR  Resp: Even and unlabored  Extremities: Equal pulses  Skin: warm, dry     Neurological:   MS: oriented x3, recent/remote memory intact, normal attention/concentration, language intact, left arm and leg hemiparesis, normal fund of knowledge  CN: visual acuity grossly normal, right eye ptosis (of note this is chronic), visual fields full, PERRL, EOMI, facial sensation equal, no facial droop, hearing symmetric, palate elevates symmetrically, shoulder shrug decreased on left, tongue midline  Motor: 5/5 on right, 0/5 LUE, 0/5 LLE  Reflexes: 1+ in all 4 ext.  Sensory: light touch sensation intact in all 4 ext.  Coordination: Normal finger to nose test on right     Physical exam performed, changes noted.    Laboratory results:  Lab Results   Component Value Date    TSH 3.700 10/30/2018     Lab Results   Component Value Date    HGBA1C 7.10 (H) 05/04/2019     Lab Results   Component Value Date    [id number] 597 05/07/2019     Lab Results   Component Value Date    CHOL 175 05/04/2019    CHLPL 179 10/30/2018    CHLPL 214 (H)  07/24/2018    CHLPL 201 (H) 01/15/2018     Lab Results   Component Value Date    TRIG 211 (H) 05/04/2019    TRIG 133 10/30/2018    TRIG 78 07/24/2018     Lab Results   Component Value Date    HDL 25 (L) 05/04/2019    HDL 35 (L) 10/30/2018    HDL 45 07/24/2018     Lab Results   Component Value Date     (H) 05/04/2019     (H) 10/30/2018     (H) 07/24/2018     Lab Results   Component Value Date    WBC 8.71 05/09/2019    HGB 9.9 (L) 05/09/2019    HCT 29.2 (L) 05/09/2019    MCV 87.2 05/09/2019     (L) 05/09/2019     Lab Results   Component Value Date    GLUCOSE 92 05/05/2019    BUN 21 (H) 05/05/2019    CREATININE 1.84 (H) 05/05/2019    EGFRIFNONA 38 (L) 05/05/2019    EGFRIFAFRI 42 (L) 01/31/2019    BCR 11.4 05/05/2019    K 3.9 05/05/2019    CO2 21.6 (L) 05/05/2019    CALCIUM 8.4 (L) 05/05/2019    PROTENTOTREF 6.2 02/07/2018    ALBUMIN 3.20 (L) 05/04/2019    LABIL2 1.6 02/07/2018    AST 17 05/04/2019    ALT 16 05/04/2019     Lab Results   Component Value Date    PTT 94.1 (H) 05/09/2019     Lab Results   Component Value Date    INR 1.04 05/07/2019    PROTIME 13.3 05/07/2019     Brief Urine Lab Results     None        New labs reviewed, PTT, CBC, CMP    Review and interpretation of imaging:  Ct Head Without Contrast    Result Date: 5/5/2019  There is no evidence for acute intracranial abnormality or for interval change when compared to the prior head CT dated 05/03/2019. Stable evidence of a prior infarct involving the right frontal lobe and temporal lobe is noted. Evidence of a prior right craniotomy is also noted.  Radiation dose reduction techniques were utilized, including automated exposure control and exposure modulation based on body size.  This report was finalized on 5/5/2019 8:01 PM by Dr. Srini Mckeon M.D.      Ct Head Without Contrast    Result Date: 5/3/2019  1. Given the difference in modality, no significant change when compared to prior MRI of the head from [facility]  [place] 05/17/2017 with no acute abnormality seen on this head CT. 2. 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 There is a large area of encephalomalacia involving the inferior lateral right frontal lobe and the anterior and superior right temporal lobe with the area of encephalomalacia measuring 8 x 5 x 5.8 cm, unchanged.  There is a lentiform shaped area of soft tissue density in the anterior inferior medial aspect of the right middle cranial fossa measuring 14 x 9 mm in size, unchanged since MRI 05/17/2017, it could be scar.  A small focus of residual or recurrent meningioma that is stable since 05/17/2017 cannot be excluded. 3. Mild small vessel disease in the cerebral white matter and there are extensive calcified atherosclerotic plaques in the intracranial segments of the distal vertebral arteries and cavernous and supracavernous segments of the internal carotid arteries bilaterally.  The remainder of the head CT is unremarkable. If there remains any clinical suspicion of an acute stroke causing the patient's left-sided weakness, I recommend an MRI of the brain for more complete assessment.  The results and recommendations were communicated to Dr. Obando from the emergency room by telephone 05/03/2019 at 12:30 PM.  Radiation dose reduction techniques were utilized, including automated exposure control and exposure modulation based on body size.  This report was finalized on 5/3/2019 3:39 PM by Dr. Alvin Hutchison M.D.      Mri Angiogram Head Without Contrast    Result Date: 5/3/2019  1. There is a 9 mm area of restricted diffusion in the right medulla at its junction with the jordin that is consistent with an area of acute ischemia. 2. Normal MRA of the head and neck without contrast.  NASCET criteria were used in this interpretation. 3. There is stable encephalomalacia in the right  frontal and parietal lobes in the area of prior infarction associated with a prior craniotomy.  These findings were communicated to the stroke physician on-call, Dr. Winkler, on 05/03/2019 at 8:55 PM.  This report was finalized on 5/3/2019 9:01 PM by Dr. Srini Mckeon M.D.      Mri Angiogram Neck Without Contrast    Result Date: 5/3/2019  1. There is a 9 mm area of restricted diffusion in the right medulla at its junction with the jordin that is consistent with an area of acute ischemia. 2. Normal MRA of the head and neck without contrast.  NASCET criteria were used in this interpretation. 3. There is stable encephalomalacia in the right frontal and parietal lobes in the area of prior infarction associated with a prior craniotomy.  These findings were communicated to the stroke physician on-call, Dr. Winkler, on 05/03/2019 at 8:55 PM.  This report was finalized on 5/3/2019 9:01 PM by Dr. Srini Mckeon M.D.      Mri Brain Without Contrast    Result Date: 5/8/2019  1. Since MRI 3 days ago, 05/03/2019, there has been a slight interval increase in the ovoid acute lacunar infarct in the right anterior jordin. Previously on 05/03/2019 it measured 8.8 x 5.5 mm in anterior posterior and medial lateral dimension, now it measures 10 x 8 mm in anterior posterior, medial lateral dimension and has increased and extended slightly by several millimeters when compared to the MRI 3 days ago and could account for the increasing weakness. The remainder of the MRI of the brain is completely unchanged dating back to an MRI of the head 05/17/2017, two years ago. 2. This patient has had a previous 8 cm lateral right frontotemporal parietal craniotomy for resection of a right middle cranial fossa-sphenoid wing meningioma and there is a large area of encephalomalacia extending from the anterior inferolateral right frontal lobe into the anterior and superior right temporal lobe, anterior right insular cortex, external capsule, putamen and  anterior limb of the right internal capsule compatible with a large old right middle cerebral artery territory infarct measuring up to 10 x 6 x 5 cm in anterior posterior, medial lateral and cranial caudal dimension, unchanged. There is an abnormal area of lentiform extra-axial soft tissue in the anterior medial aspect of the right middle cranial fossa that maximally measures 16 x 7 mm in medial lateral and anterior posterior dimension, suspicious for an area of residual or recurrent meningioma that is unchanged since prior MRI 2 years ago 05/17/2017. The remainder of the MRI of the head is unremarkable.  The results were communicated to Ramses Carrasco MD by telephone 05/06/2019 at 4:30 PM.  This report was finalized on 5/8/2019 8:29 AM by Dr. Alvin Hutchison M.D.      Mri Brain Without Contrast    Result Date: 5/3/2019  1. There is a 9 mm area of restricted diffusion in the right medulla at its junction with the jordin that is consistent with an area of acute ischemia. 2. Normal MRA of the head and neck without contrast.  NASCET criteria were used in this interpretation. 3. There is stable encephalomalacia in the right frontal and parietal lobes in the area of prior infarction associated with a prior craniotomy.  These findings were communicated to the stroke physician on-call, Dr. Winkler, on 05/03/2019 at 8:55 PM.  This report was finalized on 5/3/2019 9:01 PM by Dr. Srini Mckeon M.D.      Fl Video Swallow    Result Date: 5/7/2019  Fluoroscopy was provided for the speech pathologist during a video swallow study. For full details please see the speech pathology report  This report was finalized on 5/7/2019 8:23 PM by Dr. Manohar Taylor M.D.      Results for orders placed during the hospital encounter of 05/03/19   Adult Transthoracic Echo Complete W/ Cont if Necessary Per Protocol (With Agitated Saline)    Narrative · Left ventricular systolic function is normal. Calculated EF = 64.0%.   Estimated EF was in  agreement with the calculated EF. Normal left   ventricular cavity size noted. All left ventricular wall segments contract   normally.  · Left ventricular wall thickness is consistent with severe concentric   hypertrophy.  · Left ventricular diastolic dysfunction is noted (grade II w/high LAP)   consistent with pseudonormalization.  · There is mild calcification of the aortic valve.  · The mitral valve is abnormal in structure. MAC is present.  · No evidence of a patent foramen ovale. Saline test results are negative.          Impression/Assessment:  This is a 58-year-old male with past medical history of diabetes , hypertension, stroke,  benign meningioma status post right craniotomy for resection 10 years ago, diabetes, hypothyroidism, chronic kidney disease who presented to the ER on 5/3 with complaints of worsening left-sided weakness and difficulty walking.  He has mild residual left-sided weakness from prior stroke.  Blood pressure on arrival 169/87 with heart rate of 79.  2D echo with normal left atrium size noted, no PFO, mild calcification on aortic valve.  MRI brain revealed new right pontine stroke, MRA head and neck without significant stenosis.  On 5/4 we discontinued patient's Aggrenox and started aspirin 81 mg and Brilinta 60 mg twice daily.  We were reconsulted on 5/5 due to patient having worsening left-sided weakness and IV fluids were ordered and bed rest recommended.  CT head obtained at that time and did not show any new acute findings.  He was given a Brilinta load and a normal saline bolus for permissive hypertension.  He was also started on a heparin drip.  Dr. Louie discussed guarded prognosis with patient and family.      1.  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  PTT therapeutic.  Okay to resume therapies.  Today he is having a lot of spasticity in his left upper extremity and left lower extremity.  Patient  is receiving baclofen as needed. Will change to scheduled.  He would likely benefit from Botox injections outpatient for spasticity.  Remains with left hemiparesis.  He states he has been seeing a stroke neurologist at [facility] Dr. Finch since his first stroke and would like to follow-up with him outpatient.  Would recommend he follow-up with him in 4 to 6 weeks post discharge.  Continue Brilinta and aspirin.  Recommend acute inpatient rehab. Therapies as written. CCP for discharge planning. Call RRT for any acute neurological changes. We will sign off, will see again per request.    Plan:  Continue ASA 81mg   Continue Brilinta 90mg BID  Lipitor 80mg,   Baclofen 5mg BID  Follow-up with Dr. Finch at [facility] in 4 to 6 weeks for stroke follow-up  Neurochecks per stroke protocol  Permissive hypertension, goal -180  Stroke Education  BUCK/SCDs  PT/OT/ST  We will sign off will see again per request.     Case discussed with RN, patient, patient family, and Dr. Louie, and he agrees with plan above.  SHOBHA Batista    **Dante Disclaimer:**  Much of this encounter note is an electronic transcription/translation of spoken language to printed text. The electronic translation of spoken language may permit erroneous, or at times, nonsensical words or phrases to be inadvertently transcribed. Although I have reviewed the note for such errors, some may still exist.

## 2019-05-09 NOTE — NURSING NOTE
Continue to follow, chart reviewed. No therapies since 5/6 due to patient being on bedrest. Will need to see therapies in order to make determination of rehab needs/appropriateness. F/U for therapies.    Marie Shea RN  Rehab Admissions Nurse  [phone]

## 2019-05-09 NOTE — PLAN OF CARE
Problem: Patient Care Overview  Goal: Plan of Care Review  Outcome: Ongoing (interventions implemented as appropriate)   05/09/19 1524   Coping/Psychosocial   Plan of Care Reviewed With patient   Plan of Care Review   Progress improving   OTHER   Outcome Summary vss, no falls, c/o lle cramps, baclofen q12, dc heparin, turn q2, continue to monitor       Problem: Fall Risk (Adult)  Goal: Absence of Fall  Outcome: Ongoing (interventions implemented as appropriate)      Problem: Stroke (Ischemic) (Adult)  Goal: Signs and Symptoms of Listed Potential Problems Will be Absent, Minimized or Managed (Stroke)  Outcome: Ongoing (interventions implemented as appropriate)      Problem: Skin Injury Risk (Adult)  Goal: Skin Health and Integrity  Outcome: Ongoing (interventions implemented as appropriate)

## 2019-05-09 NOTE — THERAPY TREATMENT NOTE
"Acute Care - Physical Therapy Treatment Note  [facility]     Patient Name: Rocky Logan Jr.  : 1960  MRN: [id number]  Today's Date: 2019  Onset of Illness/Injury or Date of Surgery: 19  Date of Referral to PT: 19  Referring Physician: Dr Li    Admit Date: 5/3/2019    Visit Dx:    ICD-10-CM ICD-9-CM   1. Left leg weakness R29.898 729.89   2. Right pontine stroke (CMS/HCC) I63.50 434.91   3. Oropharyngeal dysphagia R13.12 787.22   4. Weakness generalized R53.1 780.79   5. Spasticity as late effect of cerebrovascular accident (CVA) R25.9 781.0     Patient Active Problem List   Diagnosis   • Stroke (CMS/HCC)   • Type 2 diabetes mellitus with complication, with long-term current use of insulin (CMS/HCC)   • Hyperlipidemia   • Elevated TSH   • Vitamin D deficiency   • Microalbuminuria   • Subclinical hypothyroidism   • Left leg weakness   • HTN (hypertension)   • GERD without esophagitis   • History of pontine stroke   • CKD (chronic kidney disease) stage 3, GFR 30-59 ml/min (CMS/HCC)   • History of meningioma of the brain   • Acute ischemic stroke (CMS/HCC)       Therapy Treatment    Rehabilitation Treatment Summary     Row Name 19 1430 19 1330          Treatment Time/Intention    Discipline  physical therapy assistant  -  occupational therapist pt now off bedrest.  [facility] rehab asking for expedited therapy.  -LE     Document Type  therapy note (daily note)  -  therapy note (daily note)  -LE     Subjective Information  complains of;weakness  -  complains of;weakness  -LE     Mode of Treatment  physical therapy  -EH  individual therapy;occupational therapy  -LE     Patient/Family Observations  pt supine in bed, father present in room   -  -- \"I can have family bring my walker and cane\"  -LE     Care Plan Review  patient/other agree to care plan  -  care plan/treatment goals reviewed  -LE     Care Plan Review, Other Participant(s)  --  father  -LE     Patient Effort  good  " -  good  -LE     Existing Precautions/Restrictions  fall  -  fall supported L UE in gait belt when standing  -LE     Treatment Considerations/Comments  --  -- impaired insight into deficits.   -LE     Equipment Issued to Patient  --  gait belt  -LE     Recorded by [EH] Vandana Rodriguez, PTA 05/09/19 1434 [LE] Yamilex Wilde, OTR 05/09/19 1345     Row Name 05/09/19 1330             Vital Signs    O2 Delivery Pre Treatment  room air  -LE      O2 Delivery Intra Treatment  room air  -LE      O2 Delivery Post Treatment  room air  -LE      Pre Patient Position  Supine  -LE      Intra Patient Position  Standing  -LE      Post Patient Position  Supine  -LE      Activity Duration  -- fatigue and L LE weak with standing EOB  -LE      Recorded by [LE] Yamilex Wilde, OTR 05/09/19 1345      Row Name 05/09/19 1330             Cognitive Assessment/Intervention    Additional Documentation  Cognitive Assessment/Intervention (Group)  -LE      Recorded by [LE] Yamilex Wilde, OTR 05/09/19 1345      Row Name 05/09/19 1430 05/09/19 1330          Cognitive Assessment/Intervention- PT/OT    Orientation Status (Cognition)  oriented x 3  -  oriented to;person;place;situation  -     Follows Commands (Cognition)  follows one step commands;50-74% accuracy;physical/tactile prompts required;verbal cues/prompting required  -  physical/tactile prompts required;repetition of directions required;follows one step commands;75-90% accuracy  -     Safety Deficit (Cognitive)  mild deficit;judgment;problem solving;insight into deficits/self awareness  -  mild deficit;problem solving;judgment;insight into deficits/self awareness;impulsivity;awareness of need for assistance  -LE     Personal Safety Interventions  fall prevention program maintained;gait belt;nonskid shoes/slippers when out of bed  -  fall prevention program maintained;gait belt;nonskid shoes/slippers when out of bed  -LE     Recorded by [EH] Vandana Rodriguez, DAVIDSON 05/09/19 1434 [LE]  Yamilex Wilde, OTR 05/09/19 1345     Row Name 05/09/19 1430 05/09/19 1330          Bed Mobility Assessment/Treatment    Bed Mobility Assessment/Treatment  --  rolling right  -LE     Rolling Right [place] (Bed Mobility)  --  maximum assist (25% patient effort)  -LE     Supine-Sit [place] (Bed Mobility)  moderate assist (50% patient effort)  -  maximum assist (25% patient effort)  -LE     Sit-Supine [place] (Bed Mobility)  minimum assist (75% patient effort)  -  maximum assist (25% patient effort)  -LE     Bed Mobility, Safety Issues  decreased use of arms for pushing/pulling;decreased use of legs for bridging/pushing  -  decreased use of arms for pushing/pulling;decreased use of legs for bridging/pushing;impaired trunk control for bed mobility  -LE     Assistive Device (Bed Mobility)  bed rails;draw sheet;head of bed elevated  -  bed rails;head of bed elevated;draw sheet  -LE     Comment (Bed Mobility)  --  use log roll tech.  assist to move L LE and UE during mobility  -LE     Recorded by [EH] Vandana Rodriguez, DAVIDSON 05/09/19 1434 [LE] Yamilex Wilde, OTR 05/09/19 1345     Row Name 05/09/19 1330             Functional Mobility    Functional Mobility- Ind. Level  unable to perform;not tested sidestep along EOB with Max.  impaired control L LE  -LE      Recorded by [LE] Yamilex Wilde, OTR 05/09/19 1345      Row Name 05/09/19 1430 05/09/19 1330          Sit-Stand Transfer    Sit-Stand [place] (Transfers)  minimum assist (75% patient effort);2 person assist  -  maximum assist (25% patient effort)  -LE     Assistive Device (Sit-Stand Transfers)  -- A  -  -- hold chair back w/ R hand.  OT close, ready to block L knee  -LE     Recorded by [EH] Vandana Rodriguez, DAVIDSON 05/09/19 1434 [LE] Yamilex Wilde, OTR 05/09/19 1345     Row Name 05/09/19 1430 05/09/19 1330          Stand-Sit Transfer    Stand-Sit [place] (Transfers)  minimum assist (75% patient effort);2 person assist  -  maximum assist (25%  patient effort)  -LE     Assistive Device (Stand-Sit Transfers)  -- HHA  -EH  --     Recorded by [EH] Vandana Rodriguez, [facility] 05/09/19 1434 [LE] Yamilex Wilde, OTR 05/09/19 1345     Row Name 05/09/19 1430             Gait/Stairs Assessment/Training    Comment (Gait/Stairs)  deferred ambulation due to left knee buckling.   -EH      Recorded by [] Vandana Rodriguez, [facility] 05/09/19 1434      Row Name 05/09/19 1330             ADL Assessment/Intervention    BADL Assessment/Intervention  bathing;upper body dressing;lower body dressing;grooming;feeding;toileting  -LE      Recorded by [LE] Yamilex Wilde, OTR 05/09/19 1345      Row Name 05/09/19 1330             Upper Body Dressing Assessment/Training    Comment (Upper Body Dressing)  trace L UE and chronic change R UE limits ability to complete, anticipate max A  -LE      Recorded by [LE] Yamilex Wilde R 05/09/19 1345      Row Name 05/09/19 1330             Lower Body Dressing Assessment/Training    Lower Body Dressing [place] Level  dependent (less than 25% patient effort)  -LE      Comment (Lower Body Dressing)  -- unable attempt reach feet while EOB.  unable stand unsupport  -LE      Recorded by [LE] Yamilex Wilde, OTR 05/09/19 1345      Row Name 05/09/19 1330             Self-Feeding Assessment/Training    [place] Level (Feeding)  set up using R hand.  at baseline uses L hand.   -LE      Position (Self-Feeding)  sitting up in bed OT positions pt and tray   -LE      Recorded by [LE] Yamilex Wilde, OTR 05/09/19 1345      Row Name 05/09/19 1330             Toileting Assessment/Training    [place] Level (Toileting)  maximum assist (25% patient effort);dependent (less than 25% patient effort)  -LE      Comment (Toileting)  -- urinal and bedpan with nsg assist.   -LE      Recorded by [LE] Yamilex Wilde, OTR 05/09/19 1345      Row Name 05/09/19 1330             BADL Safety/Performance    Impairments, BADL Safety/Performance  balance;grasp/prehension;coordination;range of  motion;strength  -LE      Recorded by [LE] Yamilex Wilde, OTR 05/09/19 1345      Row Name 05/09/19 1330             General ROM    RT Upper Ext  Comments  -LE      LT Upper Ext  Comment  -LE      Recorded by [LE] Yamilex Wilde, OTR 05/09/19 1345      Row Name 05/09/19 1330             Left Upper Ext    Lt Upper Extremity Comments   visible shld shrug.  trace index finger.  no other AROM noted  -LE      Recorded by [LE] Yamilex Wilde, OTR 05/09/19 1345      Row Name 05/09/19 1330             Balance    Balance  static standing balance  -LE      Recorded by [LE] Yamilex Wilde, OTR 05/09/19 1345      Row Name 05/09/19 1430 05/09/19 1330          Static Sitting Balance    Level of [place] (Unsupported Sitting, Static Balance)  contact guard assist  -EH  contact guard assist  -LE     Sitting Position (Unsupported Sitting, Static Balance)  sitting on edge of bed  -EH  sitting on edge of bed  -LE     Time Able to Maintain Position (Unsupported Sitting, Static Balance)  2 to 3 minutes  -  4 to 5 minutes  -LE     Recorded by [EH] Vandana Rodriguez, PTA 05/09/19 1434 [LE] Yamilex Wilde, OTR 05/09/19 1345     Row Name 05/09/19 1430 05/09/19 1330          Static Standing Balance    Level of [place] (Supported Standing, Static Balance)  minimal assist, 75% patient effort;2 person assist  -EH  maximal assist, 25 to 49% patient effort  -LE     Time Able to Maintain Position (Supported Standing, Static Balance)  2 to 3 minutes  -EH  30 to 45 seconds  -LE     Assistive Device Utilized (Supported Standing, Static Balance)  -- A  -  -- OT support pt with gait belt. pt holds chair back/bed rail  -LE     Comment (Supported Standing, Static Balance)  while standing pt worked on lateral weight shifting and stepping with RLE forward and back. verbal cues to keep left knee tight as pt's Left knee nena.   -  --     Recorded by [EH] Vandana Rodriguez, PTA 05/09/19 1434 [LE] Yamilex Wilde, OTR 05/09/19 1345     Row Name 05/09/19 1430  05/09/19 1330          Positioning and Restraints    Pre-Treatment Position  in bed  -EH  in bed  -LE     Post Treatment Position  bed  -  bed  -LE     In Bed  supine;call light within reach;encouraged to call for assist;exit alarm on;with family/caregiver  -  notified nsg;fowlers;call light within reach;encouraged to call for assist;exit alarm on;with family/caregiver;SCD pump applied;heels elevated  -LE     Recorded by [EH] Vandana Rodriguez, DAVIDSON 05/09/19 1434 [LE] Yamilex Wilde, OTR 05/09/19 1345     Row Name 05/09/19 1330             Pain Scale: Numbers Pre/Post-Treatment    Pain Scale: Numbers, Pretreatment  0/10 - no pain  -LE      Recorded by [LE] Yamilex Wilde, OTR 05/09/19 1345        User Key  (r) = Recorded By, (t) = Taken By, (c) = Cosigned By    Initials Name Effective Dates Discipline    LE Yamilex Wilde, OTR 06/08/18 -  OT     Vandana Rodriguez, DAVIDSON 08/19/18 -  PT               Rehab Goal Summary     Row Name 05/09/19 1300             Transfer Goal 1 (OT)    Activity/Assistive Device (Transfer Goal 1, OT)  sit-to-stand/stand-to-sit;bed-to-chair/chair-to-bed;commode, 3-in-1  -LE      [place] Level/Cues Needed (Transfer Goal 1, OT)  maximum assist (25-49% patient effort)  -LE      Time Frame (Transfer Goal 1, OT)  1 week  -LE      Progress/Outcome (Transfer Goal 1, OT)  goal ongoing  -LE         Dressing Goal 1 (OT)    Activity/Assistive Device (Dressing Goal 1, OT)  upper body dressing  -LE      [place]/Cues Needed (Dressing Goal 1, OT)  moderate assist (50-74% patient effort)  -LE      Time Frame (Dressing Goal 1, OT)  1 week  -LE      Progress/Outcome (Dressing Goal 1, OT)  goal ongoing  -LE         Toileting Goal 1 (OT)    Activity/Device (Toileting Goal 1, OT)  perform perineal hygiene;commode, 3-in-1  -LE      [place] Level/Cues Needed (Toileting Goal 1, OT)  moderate assist (50-74% patient effort)  -LE      Time Frame (Toileting Goal 1, OT)  1 week  -LE      Progress/Outcome (Toileting  Goal 1, OT)  goal ongoing  -LE         ROM Goal 1 (OT)    ROM Goal 1 (OT)  10 reps available A/AROM L UE for increase passive assist w/ ADL  -LE      Time Frame (ROM Goal 1, OT)  1 week  -LE      Progress/Outcome (ROM Goal 1, OT)  goal ongoing  -LE        User Key  (r) = Recorded By, (t) = Taken By, (c) = Cosigned By    Initials Name Provider Type Discipline    Yamilex Tay OTR Occupational Therapist OT          Physical Therapy Education     Title: PT OT SLP Therapies (Done)     Topic: Physical Therapy (Done)     Point: Mobility training (Done)     Learning Progress Summary           Patient Acceptance, E,D, VU,DU,NR by  at 5/9/2019  2:38 PM    Acceptance, E, VU,NR by AL at 5/5/2019  9:56 AM                   Point: Home exercise program (Done)     Learning Progress Summary           Patient Acceptance, E,D, VU,DU,NR by  at 5/9/2019  2:38 PM                   Point: Body mechanics (Done)     Learning Progress Summary           Patient Acceptance, E,D, VU,DU,NR by  at 5/9/2019  2:38 PM    Acceptance, E, VU,NR by AL at 5/5/2019  9:56 AM                   Point: Precautions (Done)     Learning Progress Summary           Patient Acceptance, E,D, VU,DU,NR by  at 5/9/2019  2:38 PM    Acceptance, E, VU,NR by AL at 5/5/2019  9:56 AM                               User Key     Initials Effective Dates Name Provider Type Discipline    AL 04/03/18 -  Ellie Holden, PT Physical Therapist PT     08/19/18 -  Vandana Rodriguez PTA Physical Therapy Assistant PT                PT Recommendation and Plan     Plan of Care Reviewed With: patient  Progress: improving  Outcome Summary: Pt tolerated treatment with c/o weakness. Pt required Mod/Keri for bed mobility. Pt required MinAX2 for sit/stand transfers. Pt able to maintain standing balance for 3 minutes. Pt performed lateral weight shifting and right LE, forward and back. During this exercises, pt's left knee would buckle and required assistance to block left knee and  verbal cues to keep knee locked. Pt would benefit from skilled PT to improve strength and functional mobility.   Outcome Measures     Row Name 05/09/19 1400 05/09/19 1327 05/09/19 1300       How much help from another person do you currently need...    Turning from your back to your side while in flat bed without using bedrails?  3  -EH  --  --    Moving from lying on back to sitting on the side of a flat bed without bedrails?  3  -EH  --  --    Moving to and from a bed to a chair (including a wheelchair)?  2  -EH  --  --    Standing up from a chair using your arms (e.g., wheelchair, bedside chair)?  3  -EH  --  --    Climbing 3-5 steps with a railing?  1  -EH  --  --    To walk in hospital room?  1  -EH  --  --    AM-[facility] 6 Clicks Score  13  -EH  --  --       How much help from another is currently needed...    Putting on and taking off regular lower body clothing?  --  1  -LE  --    Bathing (including washing, rinsing, and drying)  --  1  -LE  --    Toileting (which includes using toilet bed pan or urinal)  --  1  -LE  --    Putting on and taking off regular upper body clothing  --  2  -LE  --    Taking care of personal grooming (such as brushing teeth)  --  3  -LE  --    Eating meals  --  3  -LE  --    Score  --  11  -LE  --       Modified [place] Scale    Modified [place] Scale  4 - Moderately severe disability.  Unable to walk without assistance, and unable to attend to own bodily needs without assistance.  -EH  --  --       Functional Assessment    Outcome Measure Options  --  --  AM-[facility] 6 Clicks Daily Activity (OT)  -LE      User Key  (r) = Recorded By, (t) = Taken By, (c) = Cosigned By    Initials Name Provider Type    Yamilex Tay OTR Occupational Therapist    Vandana Sheridan PTA Physical Therapy Assistant         Time Calculation:   PT Charges     Row Name 05/09/19 1428             Time Calculation    Start Time  1358  -      Stop Time  1413  -      Time Calculation (min)  15 min  -      PT  Received On  05/09/19  -      PT - Next Appointment  05/10/19  -         Time Calculation- PT    Total Timed Code Minutes- PT  15 minute(s)  -        User Key  (r) = Recorded By, (t) = Taken By, (c) = Cosigned By    Initials Name Provider Type     Vandana Rodriguez PTA Physical Therapy Assistant        Therapy Charges for Today     Code Description Service Date Service Provider Modifiers Qty    12307992576 HC PT THER PROC EA 15 MIN 5/9/2019 Vandana Rodriguez PTA GP 1    26243543935 HC PT THER SUPP EA 15 MIN 5/9/2019 Vandana Rodriguez PTA GP 1          PT G-Codes  Outcome Measure Options: AM-PAC 6 Clicks Daily Activity (OT)  AM-PAC 6 Clicks Score: 13  Score: 11  Modified [place] Scale: 4 - Moderately severe disability.  Unable to walk without assistance, and unable to attend to own bodily needs without assistance.    Vandana Rodriguez PTA  5/9/2019

## 2019-05-09 NOTE — NURSING NOTE
Continue to follow, therapies noted. Will discuss with rehab [facility] In A.M. For final determination.    Marie Shea RN  Rehab Admissions Nurse  [phone]

## 2019-05-09 NOTE — PLAN OF CARE
Problem: Patient Care Overview  Goal: Plan of Care Review  Outcome: Ongoing (interventions implemented as appropriate)   05/09/19 7007   Coping/Psychosocial   Plan of Care Reviewed With patient   Plan of Care Review   Progress improving   OTHER   Outcome Summary Pt tolerated treatment with c/o weakness. Pt required Mod/Keri for bed mobility. Pt required MinAX2 for sit/stand transfers. Pt able to maintain standing balance for 3 minutes. Pt performed lateral weight shifting and right LE, forward and back. During this exercises, pt's left knee would buckle and required assistance to block left knee and verbal cues to keep knee locked. Pt would benefit from skilled PT to improve strength and functional mobility.

## 2019-05-09 NOTE — PLAN OF CARE
Problem: Patient Care Overview  Goal: Plan of Care Review  Outcome: Ongoing (interventions implemented as appropriate)   05/09/19 1420   Coping/Psychosocial   Plan of Care Reviewed With patient   OTHER   Outcome Summary Pt now off bedrest. Pt with trace L shld shrug, index finger. No other AROM noted L UE. Impaired R UE due to h/o Erbs Palsy. Pt is assist of 1 to sit EOB and Max A to stand EOB. Pt with poor insight into deficits. Pt may benefit from skillled OT to increase safety, independence and UE function.

## 2019-05-09 NOTE — PROGRESS NOTES
Continued Stay Note  [facility]     Patient Name: Rocky Logan Jr.  MRN: [id number]  Today's Date: 5/9/2019    Admit Date: 5/3/2019    Discharge Plan     Row Name 05/09/19 1135       Plan    Plan  Await PT eval for Acute Rehab referral determination vs SNF- will need [facility] prec-ert once accepted.    Patient/Family in Agreement with Plan  yes    Plan Comments  Discussed pt in rounds, bedrest lifted and MD awaiting dc disposition. CCP spoke with [facility] to expedite decision regarding Acute rehab acceptance. Awaiting PT to round. Discussed with pt, and parents at bedside, that pt will need to be meet level of care for acute rehab and be accepted, in the meantime to have backup decision regarding subacute facilities for rehab referrals. Nasra NUNEZ/CCP        Discharge Codes    No documentation.             Karla Lazo RN

## 2019-05-10 VITALS
RESPIRATION RATE: 18 BRPM | BODY MASS INDEX: 35.84 KG/M2 | DIASTOLIC BLOOD PRESSURE: 86 MMHG | SYSTOLIC BLOOD PRESSURE: 165 MMHG | HEIGHT: 66 IN | HEART RATE: 68 BPM | TEMPERATURE: 98.3 F | OXYGEN SATURATION: 96 % | WEIGHT: 223 LBS

## 2019-05-10 LAB
APTT PPP: 26.1 SECONDS (ref 22.7–35.4)
BASOPHILS # BLD AUTO: 0.04 10*3/MM3 (ref 0–0.2)
BASOPHILS NFR BLD AUTO: 0.6 % (ref 0–1.5)
DEPRECATED RDW RBC AUTO: 41.1 FL (ref 37–54)
EOSINOPHIL # BLD AUTO: 0.35 10*3/MM3 (ref 0–0.4)
EOSINOPHIL NFR BLD AUTO: 5.1 % (ref 0.3–6.2)
ERYTHROCYTE [DISTWIDTH] IN BLOOD BY AUTOMATED COUNT: 13.2 % (ref 12.3–15.4)
GLUCOSE BLDC GLUCOMTR-MCNC: 149 MG/DL (ref 70–130)
GLUCOSE BLDC GLUCOMTR-MCNC: 164 MG/DL (ref 70–130)
GLUCOSE BLDC GLUCOMTR-MCNC: 187 MG/DL (ref 70–130)
HCT VFR BLD AUTO: 29.8 % (ref 37.5–51)
HGB BLD-MCNC: 10.1 G/DL (ref 13–17.7)
IMM GRANULOCYTES # BLD AUTO: 0.03 10*3/MM3 (ref 0–0.05)
IMM GRANULOCYTES NFR BLD AUTO: 0.4 % (ref 0–0.5)
LYMPHOCYTES # BLD AUTO: 0.74 10*3/MM3 (ref 0.7–3.1)
LYMPHOCYTES NFR BLD AUTO: 10.8 % (ref 19.6–45.3)
MCH RBC QN AUTO: 29 PG (ref 26.6–33)
MCHC RBC AUTO-ENTMCNC: 33.9 G/DL (ref 31.5–35.7)
MCV RBC AUTO: 85.6 FL (ref 79–97)
MONOCYTES # BLD AUTO: 0.69 10*3/MM3 (ref 0.1–0.9)
MONOCYTES NFR BLD AUTO: 10.1 % (ref 5–12)
NEUTROPHILS # BLD AUTO: 5.01 10*3/MM3 (ref 1.7–7)
NEUTROPHILS NFR BLD AUTO: 73 % (ref 42.7–76)
NRBC BLD AUTO-RTO: 0 /100 WBC (ref 0–0.2)
PLATELET # BLD AUTO: 137 10*3/MM3 (ref 140–450)
PMV BLD AUTO: 10.9 FL (ref 6–12)
RBC # BLD AUTO: 3.48 10*6/MM3 (ref 4.14–5.8)
WBC NRBC COR # BLD: 6.86 10*3/MM3 (ref 3.4–10.8)

## 2019-05-10 PROCEDURE — 82962 GLUCOSE BLOOD TEST: CPT

## 2019-05-10 PROCEDURE — 85730 THROMBOPLASTIN TIME PARTIAL: CPT | Performed by: PSYCHIATRY & NEUROLOGY

## 2019-05-10 PROCEDURE — 63710000001 INSULIN LISPRO (HUMAN) PER 5 UNITS: Performed by: INTERNAL MEDICINE

## 2019-05-10 PROCEDURE — 97110 THERAPEUTIC EXERCISES: CPT

## 2019-05-10 PROCEDURE — 85025 COMPLETE CBC W/AUTO DIFF WBC: CPT | Performed by: PSYCHIATRY & NEUROLOGY

## 2019-05-10 PROCEDURE — 63710000001 INSULIN LISPRO (HUMAN) PER 5 UNITS: Performed by: HOSPITALIST

## 2019-05-10 PROCEDURE — 63710000001 INSULIN GLARGINE PER 5 UNITS: Performed by: NURSE PRACTITIONER

## 2019-05-10 RX ORDER — BACLOFEN 5 MG/1
5 TABLET ORAL EVERY 12 HOURS SCHEDULED
Start: 2019-05-10

## 2019-05-10 RX ORDER — INSULIN GLARGINE 100 [IU]/ML
10 INJECTION, SOLUTION SUBCUTANEOUS NIGHTLY
Refills: 12
Start: 2019-05-10 | End: 2019-05-30 | Stop reason: CLARIF

## 2019-05-10 RX ORDER — SENNA AND DOCUSATE SODIUM 50; 8.6 MG/1; MG/1
2 TABLET, FILM COATED ORAL NIGHTLY
Start: 2019-05-10

## 2019-05-10 RX ORDER — ASPIRIN 81 MG/1
81 TABLET, CHEWABLE ORAL DAILY
Start: 2019-05-11

## 2019-05-10 RX ORDER — FOLIC ACID 1 MG/1
1 TABLET ORAL DAILY
Start: 2019-05-11

## 2019-05-10 RX ORDER — INSULIN GLARGINE 100 [IU]/ML
15 INJECTION, SOLUTION SUBCUTANEOUS EVERY MORNING
Refills: 12
Start: 2019-05-11 | End: 2019-05-30 | Stop reason: CLARIF

## 2019-05-10 RX ADMIN — CARVEDILOL 25 MG: 25 TABLET, FILM COATED ORAL at 08:13

## 2019-05-10 RX ADMIN — INSULIN GLARGINE 15 UNITS: 100 INJECTION, SOLUTION SUBCUTANEOUS at 06:29

## 2019-05-10 RX ADMIN — INSULIN LISPRO 5 UNITS: 100 INJECTION, SOLUTION INTRAVENOUS; SUBCUTANEOUS at 08:14

## 2019-05-10 RX ADMIN — FOLIC ACID 1 MG: 1 TABLET ORAL at 08:13

## 2019-05-10 RX ADMIN — TICAGRELOR 90 MG: 90 TABLET ORAL at 08:13

## 2019-05-10 RX ADMIN — INSULIN LISPRO 5 UNITS: 100 INJECTION, SOLUTION INTRAVENOUS; SUBCUTANEOUS at 11:38

## 2019-05-10 RX ADMIN — BACLOFEN 5 MG: 10 TABLET ORAL at 08:13

## 2019-05-10 RX ADMIN — VITAMIN D, TAB 1000IU (100/BT) 1000 UNITS: 25 TAB at 08:13

## 2019-05-10 RX ADMIN — ATORVASTATIN CALCIUM 80 MG: 80 TABLET, FILM COATED ORAL at 08:13

## 2019-05-10 RX ADMIN — ASPIRIN 81 MG: 81 TABLET, CHEWABLE ORAL at 08:13

## 2019-05-10 RX ADMIN — SODIUM CHLORIDE, PRESERVATIVE FREE 3 ML: 5 INJECTION INTRAVENOUS at 08:14

## 2019-05-10 RX ADMIN — LINAGLIPTIN 5 MG: 5 TABLET, FILM COATED ORAL at 08:13

## 2019-05-10 RX ADMIN — PANTOPRAZOLE SODIUM 40 MG: 40 TABLET, DELAYED RELEASE ORAL at 06:29

## 2019-05-10 RX ADMIN — AMLODIPINE BESYLATE 10 MG: 10 TABLET ORAL at 08:13

## 2019-05-10 RX ADMIN — LEVETIRACETAM 500 MG: 500 TABLET, FILM COATED ORAL at 08:13

## 2019-05-10 RX ADMIN — INSULIN LISPRO 3 UNITS: 100 INJECTION, SOLUTION INTRAVENOUS; SUBCUTANEOUS at 08:13

## 2019-05-10 RX ADMIN — LEVOTHYROXINE SODIUM 50 MCG: 50 TABLET ORAL at 08:13

## 2019-05-10 NOTE — THERAPY TREATMENT NOTE
Acute Care - Physical Therapy Treatment Note  [facility]     Patient Name: Rocky Logan Jr.  : 1960  MRN: [id number]  Today's Date: 5/10/2019  Onset of Illness/Injury or Date of Surgery: 19  Date of Referral to PT: 19  Referring Physician: Dr Li    Admit Date: 5/3/2019    Visit Dx:    ICD-10-CM ICD-9-CM   1. Left leg weakness R29.898 729.89   2. Right pontine stroke (CMS/HCC) I63.50 434.91   3. Oropharyngeal dysphagia R13.12 787.22   4. Weakness generalized R53.1 780.79   5. Spasticity as late effect of cerebrovascular accident (CVA) R25.9 781.0     Patient Active Problem List   Diagnosis   • Stroke (CMS/HCC)   • Type 2 diabetes mellitus with complication, with long-term current use of insulin (CMS/[facility])   • Hyperlipidemia   • Elevated TSH   • Vitamin D deficiency   • Microalbuminuria   • Subclinical hypothyroidism   • Left leg weakness   • HTN (hypertension)   • GERD without esophagitis   • History of pontine stroke   • CKD (chronic kidney disease) stage 3, GFR 30-59 ml/min (CMS/[facility])   • History of meningioma of the brain   • Acute ischemic stroke (CMS/HCC)       Therapy Treatment    Rehabilitation Treatment Summary     Row Name 05/10/19 1606             Treatment Time/Intention    Discipline  physical therapy assistant  -      Document Type  therapy note (daily note)  -EH      Subjective Information  no complaints  -EH      Mode of Treatment  physical therapy  -EH      Patient/Family Observations  pt supine in bed, family present in room   -EH      Care Plan Review  patient/other agree to care plan  -      Therapy Frequency (PT Clinical Impression)  daily  -EH      Patient Effort  good  -EH      Existing Precautions/Restrictions  fall  -EH      Recorded by [] Vandana Rodriguez PTA 05/10/19 160      Row Name 05/10/19 1606             Cognitive Assessment/Intervention- PT/OT    Orientation Status (Cognition)  oriented x 4  -EH      Follows Commands (Cognition)  WFL  -EH      Safety  Deficit (Cognitive)  mild deficit  -      Personal Safety Interventions  fall prevention program maintained;gait belt;nonskid shoes/slippers when out of bed  -      Recorded by [] Vandana Rodriguez [facility] 05/10/19 1609      Row Name 05/10/19 1606             Bed Mobility Assessment/Treatment    Rolling Right McCulloch (Bed Mobility)  moderate assist (50% patient effort)  -      Supine-Sit McCulloch (Bed Mobility)  moderate assist (50% patient effort)  -      Sit-Supine McCulloch (Bed Mobility)  minimum assist (75% patient effort);2 person assist  -      Bed Mobility, Safety Issues  decreased use of arms for pushing/pulling;decreased use of legs for bridging/pushing  -      Assistive Device (Bed Mobility)  bed rails;head of bed elevated  -      Recorded by [] Vandana Rodriguez [facility] 05/10/19 1609      Row Name 05/10/19 1606             Transfer Assessment/Treatment    Comment (Transfers)  pt performed several stand pivot transfers today with MinAX2.   -      Recorded by [] Vandana Rodriguez PTA 05/10/19 1609      Row Name 05/10/19 1606             Chair-Bed Transfer    Chair-Bed McCulloch (Transfers)  minimum assist (75% patient effort);2 person assist transferring pt to the stronger side (right side)  -      Assistive Device (Chair-Bed Transfers)  -- A   -      Recorded by [] Vandana Rodriguez [facility] 05/10/19 1609      Row Name 05/10/19 1606             Sit-Stand Transfer    Sit-Stand McCulloch (Transfers)  minimum assist (75% patient effort);2 person assist  -      Assistive Device (Sit-Stand Transfers)  -- A  -      Recorded by [] Vandana Rodriguez [facility] 05/10/19 1609      Row Name 05/10/19 1606             Stand-Sit Transfer    Stand-Sit [place]och (Transfers)  minimum assist (75% patient effort);2 person assist  -      Assistive Device (Stand-Sit Transfers)  -- [facility]  -      Recorded by [] Vandana Rodriguez [facility] 05/10/19 1609      Row Name 05/10/19 1606             Positioning and  Restraints    Pre-Treatment Position  in bed  -EH      Post Treatment Position  bed  -EH      In Bed  supine;call light within reach;encouraged to call for assist;exit alarm on;with family/caregiver  -      Recorded by [] Vandana Rodriguez PTA 05/10/19 5487        User Key  (r) = Recorded By, (t) = Taken By, (c) = Cosigned By    Initials Name Effective Dates Discipline     Vandana Rodriguez PTA 08/19/18 -  PT                   Physical Therapy Education     Title: PT OT SLP Therapies (Resolved)     Topic: Physical Therapy (Resolved)     Point: Mobility training (Resolved)     Learning Progress Summary           Patient Acceptance, E,D, VU,DU,NR by  at 5/9/2019  2:38 PM    Acceptance, E, VU,NR by AL at 5/5/2019  9:56 AM                   Point: Home exercise program (Resolved)     Learning Progress Summary           Patient Acceptance, E,D, VU,DU,NR by  at 5/9/2019  2:38 PM                   Point: Body mechanics (Resolved)     Learning Progress Summary           Patient Acceptance, E,D, VU,DU,NR by  at 5/9/2019  2:38 PM    Acceptance, E, VU,NR by AL at 5/5/2019  9:56 AM                   Point: Precautions (Resolved)     Learning Progress Summary           Patient Acceptance, E,D, VU,DU,NR by  at 5/9/2019  2:38 PM    Acceptance, E, VU,NR by AL at 5/5/2019  9:56 AM                               User Key     Initials Effective Dates Name Provider Type Discipline    AL 04/03/18 -  Ellie Holden, PT Physical Therapist PT     08/19/18 -  Vandana Rodriguez PTA Physical Therapy Assistant PT                PT Recommendation and Plan  Therapy Frequency (PT Clinical Impression): daily  Plan of Care Reviewed With: patient  Progress: improving  Outcome Summary: Pt tolerated treatment with c/o weakness. Pt required Mod/Keri for bed mobility. Pt required MinAX2 for sit/stand transfers. Pt able to maintain standing balance for 3 minutes. Pt performed lateral weight shifting and right LE, forward and back. During this  exercises, pt's left knee would buckle and required assistance to block left knee and verbal cues to keep knee locked. Pt would benefit from skilled PT to improve strength and functional mobility.   Outcome Measures     Row Name 05/10/19 1600 05/09/19 1400 05/09/19 1327       How much help from another person do you currently need...    Turning from your back to your side while in flat bed without using bedrails?  2  -EH  3  -EH  --    Moving from lying on back to sitting on the side of a flat bed without bedrails?  2  -EH  3  -EH  --    Moving to and from a bed to a chair (including a wheelchair)?  3  -EH  2  -EH  --    Standing up from a chair using your arms (e.g., wheelchair, bedside chair)?  3  -EH  3  -EH  --    Climbing 3-5 steps with a railing?  1  -EH  1  -EH  --    To walk in hospital room?  1  -EH  1  -EH  --    AM-PAC 6 Clicks Score  12  -EH  13  -EH  --       How much help from another is currently needed...    Putting on and taking off regular lower body clothing?  --  --  1  -LE    Bathing (including washing, rinsing, and drying)  --  --  1  -LE    Toileting (which includes using toilet bed pan or urinal)  --  --  1  -LE    Putting on and taking off regular upper body clothing  --  --  2  -LE    Taking care of personal grooming (such as brushing teeth)  --  --  3  -LE    Eating meals  --  --  3  -LE    Score  --  --  11  -LE       Modified [place] Scale    Modified [place] Scale  --  4 - Moderately severe disability.  Unable to walk without assistance, and unable to attend to own bodily needs without assistance.  -EH  --    Row Name 05/09/19 1300             Functional Assessment    Outcome Measure Options  AM-PAC 6 Clicks Daily Activity (OT)  -LE        User Key  (r) = Recorded By, (t) = Taken By, (c) = Cosigned By    Initials Name Provider Type    Yamilex Tay, OTR Occupational Therapist    EH Vandana Rodriguez PTA Physical Therapy Assistant         Time Calculation:   PT Charges     Row Name 05/10/19  1605             Time Calculation    Start Time  1443  -      Stop Time  1507  -      Time Calculation (min)  24 min  -      PT Received On  05/10/19  -      PT - Next Appointment  05/11/19  -         Time Calculation- PT    Total Timed Code Minutes- PT  24 minute(s)  -        User Key  (r) = Recorded By, (t) = Taken By, (c) = Cosigned By    Initials Name Provider Type     Vandana Rodriguez PTA Physical Therapy Assistant        Therapy Charges for Today     Code Description Service Date Service Provider Modifiers Qty    56271780536 HC PT THER PROC EA 15 MIN 5/9/2019 Vandana Rodriguez PTA GP 1    91211155771 HC PT THER SUPP EA 15 MIN 5/9/2019 Vandana Rodriguez PTA GP 1    15235214431 HC PT THER PROC EA 15 MIN 5/10/2019 Vandana Rodriguez PTA GP 2    82908286836 HC PT THER SUPP EA 15 MIN 5/10/2019 Vandana Rodriguez PTA GP 1          PT G-Codes  Outcome Measure Options: AM-PAC 6 Clicks Daily Activity (OT)  AM-PAC 6 Clicks Score: 12  Score: 11  Modified Kitt[place] Scale: 4 - Moderately severe disability.  Unable to walk without assistance, and unable to attend to own bodily needs without assistance.    Vandana Rodriguez PTA  5/10/2019

## 2019-05-10 NOTE — NURSING NOTE
Spoke with sisterDaniela (as per 0959 CCP note by YOAV Lazo) who says there are no other family members able to stay with the patient and his parents after rehab to assist physically.  Recommendation is for a longer subacute rehab stay as discussed with CCP.  Will sign off re: inpatient acute rehab.   Thank you--Kyra Martínez,  Rehab Adm Nurse

## 2019-05-10 NOTE — DISCHARGE PLACEMENT REQUEST
"Rocky Ma Jr. (58 y.o. 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)   Code Status:  CPR    Ht:  167.6 cm (66\")   Wt:  101 kg (223 lb)    Admission Cmt:  None   Principal Problem:  Acute ischemic stroke (CMS/HCC) [I63.9]                 Active Insurance as of 5/3/2019     Primary Coverage     Payor Plan Insurance Group Employer/Plan Group    [facility] MEDICARE REPLACEMENT [facility] [id number]     Payor Plan Address Payor Plan Phone Number Payor Plan Fax Number Effective Dates    [address] [address]   1/1/2019 - None Entered    [facility] [id number]       Subscriber Name Subscriber Birth Date Member ID       Rocky Ma Jr. 1960 [id number]                 Emergency Contacts      (Rel.) Home Phone Work Phone Mobile Phone    Rocky Ma (Father) [phone] -- --    DesmondLinda (Mother) [phone] -- --    TALI MA (Brother) -- -- [phone]    GIO COLLAZO (Sister) -- -- [phone]    Linda Ma [phone] -- --              "

## 2019-05-10 NOTE — DISCHARGE SUMMARY
Date of Admission: 5/3/2019  Date of Discharge:  5/10/2019  Primary Care Physician: Karen Martinez, SHOBHA     Discharge Diagnosis:  Active Hospital Problems    Diagnosis  POA   • **Acute ischemic stroke (CMS/[facility]) [I63.9]  Yes   • Left leg weakness [R29.898]  Yes   • HTN (hypertension) [I10]  Yes   • GERD without esophagitis [K21.9]  Yes   • History of pontine stroke [Z86.73]  Not Applicable   • CKD (chronic kidney disease) stage 3, GFR 30-59 ml/min (CMS/[facility]) [N18.3]  Yes   • History of meningioma of the brain [Z86.011]  Not Applicable   • Subclinical hypothyroidism [E03.9]  Yes   • Type 2 diabetes mellitus with complication, with long-term current use of insulin (CMS/[facility]) [E11.8, Z79.4]  Not Applicable   • Vitamin D deficiency [E55.9]  Yes   • Hyperlipidemia [E78.5]  Yes      Resolved Hospital Problems   No resolved problems to display.       DETAILS OF HOSPITAL STAY     Pertinent Test Results and Procedures Performed    Head CT on 5/3/2019:  1. Given the difference in modality, no significant change when compared  to prior MRI of the head from [facility] 05/17/2017 with  no acute abnormality seen on this head CT.  2. There has been a previous 10 x 8 cm lateral right frontotemporal  parietal-pterional craniotomy for resection of a right sphenoid  wing-middle cranial fossa meningioma. There is a large area of  encephalomalacia involving the inferior lateral right frontal lobe and  the anterior and superior right temporal lobe with the area of  encephalomalacia measuring 8 x 5 x 5.8 cm, unchanged.  There is a  lentiform shaped area of soft tissue density in the anterior inferior  medial aspect of the right middle cranial fossa measuring 14 x 9 mm in  size, unchanged since MRI 05/17/2017, it could be scar.  A small focus  of residual or recurrent meningioma that is stable since 05/17/2017  cannot be excluded.  3. Mild small vessel disease in the cerebral white matter and there are  extensive  calcified atherosclerotic plaques in the intracranial segments  of the distal vertebral arteries and cavernous and supracavernous  segments of the internal carotid arteries bilaterally.  The remainder of  the head CT is unremarkable. If there remains any clinical suspicion of  an acute stroke causing the patient's left-sided weakness, I recommend  an MRI of the brain for more complete assessment.  The results and  recommendations were communicated to Dr. Obando from the emergency room  by telephone 05/03/2019 at 12:30 PM.     Brain MRI with MRA of the head and neck on 5/3/2019:  1. There is a 9 mm area of restricted diffusion in the right medulla at  its junction with the jordin that is consistent with an area of acute  ischemia.  2. Normal MRA of the head and neck without contrast.  NASCET criteria  were used in this interpretation.  3. There is stable encephalomalacia in the right frontal and parietal  lobes in the area of prior infarction associated with a prior  craniotomy.    Head CT on 5/5/2019:  There is no evidence for acute intracranial abnormality or  for interval change when compared to the prior head CT dated 05/03/2019.  Stable evidence of a prior infarct involving the right frontal lobe and  temporal lobe is noted. Evidence of a prior right craniotomy is also  noted.    Brain MRI on 5/6/2019:  1. Since MRI 3 days ago, 05/03/2019, there has been a slight interval  increase in the ovoid acute lacunar infarct in the right anterior jordin.   Previously on 05/03/2019 it measured 8.8 x 5.5 mm in anterior posterior  and medial lateral dimension, now it measures 10 x 8 mm in anterior  posterior, medial lateral dimension and has increased and extended  slightly by several millimeters when compared to the MRI 3 days ago and  could account for the increasing weakness. The remainder of the MRI of  the brain is completely unchanged dating back to an MRI of the head  05/17/2017, two years ago.  2. This patient has had  a previous 8 cm lateral right frontotemporal  parietal craniotomy for resection of a right middle cranial  fossa-sphenoid wing meningioma and there is a large area of  encephalomalacia extending from the anterior inferolateral right frontal  lobe into the anterior and superior right temporal lobe, anterior right  insular cortex, external capsule, putamen and anterior limb of the right  internal capsule compatible with a large old right middle cerebral  artery territory infarct measuring up to 10 x 6 x 5 cm in anterior  posterior, medial lateral and cranial caudal dimension, unchanged.   There is an abnormal area of lentiform extra-axial soft tissue in the  anterior medial aspect of the right middle cranial fossa that maximally  measures 16 x 7 mm in medial lateral and anterior posterior dimension,  suspicious for an area of residual or recurrent meningioma that is  unchanged since prior MRI 2 years ago 05/17/2017. The remainder of the  MRI of the head is unremarkable.  The results were communicated to Ramses Carrasco MD by telephone 05/06/2019 at 4:30 PM.     Hospital Course  This is a 58-year-old male with past medical history of diabetes , hypertension, stroke,  benign meningioma status post right craniotomy for resection 10 years ago, diabetes, hypothyroidism, chronic kidney disease who presented to the ER on 5/3 with complaints of worsening left-sided weakness and difficulty walking.  He has mild residual left-sided weakness from prior stroke.  Blood pressure on arrival 169/87 with heart rate of 79.  2D echo with normal left atrium size noted, no PFO, mild calcification on aortic valve.  MRI brain revealed new right pontine stroke, MRA head and neck without significant stenosis.  On 5/4 we discontinued patient's Aggrenox and started aspirin 81 mg and Brilinta 60 mg twice daily.  On 5/5 the patient began having worsening left-sided weakness and IV fluids were ordered and bed rest recommended.  CT head obtained at  that time and did not show any new acute findings.  He was given a Brilinta load and a normal saline bolus for permissive hypertension.  He was also started on a heparin drip which was maintained for several days.  After several days of bedrest and no new neurologic symptoms, neurology cleared him to resume therapy.  He still has left hemiparesis but this is improving slowly.  At this point neurology recommends continuation of Brilinta and aspirin.  I am seeing the patient today for the first time.  At this point in his care he is awaiting insurance precertification for rehab.  This has now been obtained and he will be released today.  He will follow-up with his primary neurologist at [place], Dr. Finch, in 4 to 6 weeks.  Neurology has signed off and cleared him for discharge.  He will be released today in stable condition.  Neurology recommends permissive hypertension for now with goal systolic blood pressure between 140 and 180.  He can have slow correction and normalization of blood pressure in the outpatient setting.        Physical Exam at Discharge:  General: No acute distress, AAOx3  HEENT: EOMI, PERRL  Cardiovascular: +s1 and s2, RRR  Lungs: No rhonchi or wheezing  Abdomen: soft, nontender    Consults:   Consults     Date and Time Order Name Status Description    5/3/2019 1659 Inpatient Neurology Consult Stroke Completed     5/3/2019 1302 [facility] (on-call MD unless specified) Completed     5/3/2019 1226 Inpatient Neurology Consult Stroke Completed             Condition on Discharge: Stable, improved    Discharge Disposition  Skilled Nursing Facility (DC - External)    Discharge Medications     Discharge Medications      New Medications      Instructions Start Date   aspirin 81 MG chewable tablet   81 mg, Oral, Daily   Start Date:  5/11/2019     Baclofen 5 MG tablet   5 mg, Oral, Every 12 Hours Scheduled      folic acid 1 MG tablet  Commonly known as:  FOLVITE   1 mg, Oral, Daily   Start Date:  5/11/2019      insulin glargine 100 UNIT/ML injection  Commonly known as:  LANTUS   10 Units, Subcutaneous, Nightly      insulin glargine 100 UNIT/ML injection  Commonly known as:  LANTUS  Replaces:  TOUJEO SOLOSTAR 300 UNIT/ML solution pen-injector   15 Units, Subcutaneous, Every Morning   Start Date:  5/11/2019     insulin lispro 100 UNIT/ML injection  Commonly known as:  humaLOG  Replaces:  HUMALOG KWIKPEN 100 UNIT/ML solution pen-injector   5 Units, Subcutaneous, 3 Times Daily With Meals      sennosides-docusate sodium 8.6-50 MG tablet  Commonly known as:  SENOKOT-S   2 tablets, Oral, Nightly      ticagrelor 90 MG tablet tablet  Commonly known as:  BRILINTA   90 mg, Oral, 2 Times Daily         Continue These Medications      Instructions Start Date   accu-chek soft touch lancets   To check 3 - 4 times a day      amitriptyline 25 MG tablet  Commonly known as:  ELAVIL   25 mg, Oral, Nightly      amLODIPine 10 MG tablet  Commonly known as:  NORVASC   10 mg, Oral, Every 24 Hours Scheduled      atorvastatin 80 MG tablet  Commonly known as:  LIPITOR   TAKE ONE TABLET BY MOUTH DAILY      carvedilol 25 MG tablet  Commonly known as:  COREG   25 mg, Oral, 2 Times Daily With Meals      cholecalciferol 1000 units tablet  Commonly known as:  VITAMIN D3   1,000 Units, Oral, 2 Times Daily      glucosamine-chondroitin 500-400 MG capsule capsule   1 capsule, Oral, Daily      glucose blood test strip  Commonly known as:  ACCU-CHEK COMPACT PLUS   To check 3 - 4 times a day      glucose blood test strip  Commonly known as:  ACCU-CHEK MILENA   To check to check 3 - 4 times      glucose blood test strip  Commonly known as:  FREESTYLE LITE   USE TO TEST BLOOD SUGAR 4 TIMES DAILY ICD 10 CODE E11.9      levETIRAcetam 500 MG tablet  Commonly known as:  KEPPRA   500 mg, Oral, 2 Times Daily      levothyroxine 50 MCG tablet  Commonly known as:  SYNTHROID, LEVOTHROID   50 mcg, Oral, Daily      linagliptin 5 MG tablet tablet  Commonly known as:   TRADJENTA   5 mg, Oral, Daily      lisinopril 40 MG tablet  Commonly known as:  PRINIVIL,ZESTRIL   40 mg, Oral, Daily      MULTIVITAMIN ADULT PO   1 tablet, Oral, Daily      omega-3 acid ethyl esters 1 g capsule  Commonly known as:  LOVAZA   TAKE TWO CAPSULES BY MOUTH DAILY      omeprazole 40 MG capsule  Commonly known as:  priLOSEC   40 mg, Oral, Daily         Stop These Medications    aspirin-dipyridamole  MG per 12 hr capsule  Commonly known as:  AGGRENOX     carbonyl iron 45 MG tablet tablet  Commonly known as:  FEOSOL     cyclobenzaprine 10 MG tablet  Commonly known as:  FLEXERIL     HUMALOG KWIKPEN 100 UNIT/ML solution pen-injector  Generic drug:  Insulin Lispro  Replaced by:  insulin lispro 100 UNIT/ML injection     TOUJEO SOLOSTAR 300 UNIT/ML solution pen-injector  Generic drug:  Insulin Glargine  Replaced by:  insulin glargine 100 UNIT/ML injection     TURMERIC PO            Discharge Diet:   Diet Instructions     Diet: Regular, Consistent Carbohydrate, Cardiac; Thin      Discharge Diet:   Regular  Consistent Carbohydrate  Cardiac       Fluid Consistency:  Thin          Activity at Discharge:   Activity Instructions     Activity as Tolerated      Additional Activity Instructions:      Personal Stroke Risk Factors:  Hypertension  High Cholesterol  Diabetes  Previous stroke                      Follow-up Appointments  No future appointments.  Additional Instructions for the Follow-ups that You Need to Schedule     Discharge Follow-up with PCP   As directed       Currently Documented PCP:    Karen Martinez APRN    PCP Phone Number:    [phone]     Follow Up Details:  1 week         Discharge Follow-up with Specified Provider: Dr. iFnch of Neurology at [place] in 4-6 weeks   As directed      To:  Dr. Finch of Neurology at [place] in 4-6 weeks               I have examined and discussed discharge planning with the patient today.     Alvin Gallo MD  05/10/19  3:41 PM    Time: Discharge  greater than 30 min

## 2019-05-10 NOTE — PROGRESS NOTES
Continued Stay Note  [facility]     Patient Name: Rocky Logan Jr.  MRN: [id number]  Today's Date: 5/10/2019    Admit Date: 5/3/2019    Discharge Plan     Row Name 05/10/19 0959       Plan    Plan  [facility] vs SNF- will need [facility] Pre-cert    Patient/Family in Agreement with Plan  yes    Plan Comments  CCP spoke with pts sister Daniela and pt at bedside, CCP explained still awaiting Acute Rehab decision and need to have SNF backup choices, as MD ready for dc and will need to initate pre-cert. CCP reviewed SNF list and Daniela requested referral to [facility], [facility], [place], [facility] and [facility]. Spoke with [facility] Bed availablity is low but will evaluate. CCP left vm for [facility] for [place] and [facility]. CCP called [facility] and [facility]. Spoke with Kyra/[facility] and she will call pts sister Daniela and discuss. Packet in CCP office. [facility]ra NUNEZ/[facility]SET[facility]        Discharge Codes    No documentation.             Karla Lazo, ENRIQUE

## 2019-05-10 NOTE — SIGNIFICANT NOTE
05/10/19 0846   Rehab Treatment   Discipline occupational therapist   Reason Treatment Not Performed patient/family declined treatment  (8:45 Pt. eating breakfast. Family present. Preferred to hold tx. at present. Pt. reports he is able to move his fingers better. )   Recommendation   OT - Next Appointment 05/13/19

## 2019-05-10 NOTE — PLAN OF CARE
Problem: Patient Care Overview  Goal: Plan of Care Review  Outcome: Ongoing (interventions implemented as appropriate)   05/10/19 6850   Coping/Psychosocial   Plan of Care Reviewed With patient   Plan of Care Review   Progress improving   OTHER   Outcome Summary vss, no falls, no pain, baclofen for cramps, dc to rehab today, continue to monitor       Problem: Fall Risk (Adult)  Goal: Absence of Fall  Outcome: Ongoing (interventions implemented as appropriate)      Problem: Stroke (Ischemic) (Adult)  Goal: Signs and Symptoms of Listed Potential Problems Will be Absent, Minimized or Managed (Stroke)  Outcome: Ongoing (interventions implemented as appropriate)      Problem: Skin Injury Risk (Adult)  Goal: Skin Health and Integrity  Outcome: Ongoing (interventions implemented as appropriate)

## 2019-05-10 NOTE — PLAN OF CARE
Problem: Patient Care Overview  Goal: Plan of Care Review  Outcome: Ongoing (interventions implemented as appropriate)   05/10/19 0456   Coping/Psychosocial   Plan of Care Reviewed With patient   Plan of Care Review   Progress improving   OTHER   Outcome Summary A&O, no complaints of pain, left hand cramping, baclofen given with relief, NIH 6, turn q2hr, voiding without difficulty, no bm this shift, vss, will continue to monitor.     Goal: Individualization and Mutuality  Outcome: Ongoing (interventions implemented as appropriate)    Goal: Discharge Needs Assessment  Outcome: Ongoing (interventions implemented as appropriate)      Problem: Fall Risk (Adult)  Goal: Absence of Fall  Outcome: Ongoing (interventions implemented as appropriate)      Problem: Stroke (Ischemic) (Adult)  Goal: Signs and Symptoms of Listed Potential Problems Will be Absent, Minimized or Managed (Stroke)  Outcome: Ongoing (interventions implemented as appropriate)      Problem: Skin Injury Risk (Adult)  Goal: Skin Health and Integrity  Outcome: Ongoing (interventions implemented as appropriate)

## 2019-05-10 NOTE — PROGRESS NOTES
" LOS: 6 days     Name: Rocky Logan Jr.  Age: 58 y.o.  Sex: male  :  1960  MRN: [id number]         Primary Care Physician: Karen Martinez APRN    Subjective   Subjective  Patient and family report that he has been a little more sleepy today after initiation of baclofen but is currently awake, alert, and eating lunch.  No new complaints.  States that he is able to move his fingers on his left hand a little bit more today which they are all excited about.    Objective   Vital Signs  Temp:  [97.4 °F (36.3 °C)-98 °F (36.7 °C)] 97.4 °F (36.3 °C)  Heart Rate:  [70-79] 79  Resp:  [18] 18  BP: (163-183)/() 183/102  Body mass index is 36.01 kg/m².    Objective:  General Appearance:  Comfortable and in no acute distress.    Vital signs: (most recent): Blood pressure (!) 183/102, pulse 79, temperature 97.4 °F (36.3 °C), temperature source Oral, resp. rate 18, height 167.6 cm (66\"), weight 101 kg (223 lb), SpO2 96 %.    Lungs:  Normal effort and normal respiratory rate.    Heart: Normal rate.  Regular rhythm.    Abdomen: Abdomen is soft.  Bowel sounds are normal.   There is no abdominal tenderness.     Extremities: There is no dependent edema or local swelling.    Neurological: Patient is alert and oriented to person, place and time.  (He is able to wiggle his first 3 fingers on his left hand still with decreased sensation over the wrist and dorsal aspect of the hand).    Skin:  Warm and dry.  (2 sutures in place over the right upper chest wall with good approximation and healed skin.)            Results Review:       I reviewed the patient's new clinical results.    Results from last 7 days   Lab Units 05/10/19  0539 19  0431 19  0636 19  0248 19  1310 19  0433 19  1331   WBC 10*3/mm3 6.86 8.71 6.89 7.38 6.79 7.37 8.10   HEMOGLOBIN g/dL 10.1* 9.9* 9.9* 10.5* 10.0* 10.2* 11.7*   PLATELETS 10*3/mm3 137* 133* 130* 147 144 156 168     Results from last 7 days   Lab Units " 05/05/19  0559 05/04/19  0433 05/03/19  1404   SODIUM mmol/L 144 144 145   POTASSIUM mmol/L 3.9 3.7 4.8   CHLORIDE mmol/L 111* 112* 111*   CO2 mmol/L 21.6* 22.1 22.8   BUN mg/dL 21* 23* 26*   CREATININE mg/dL 1.84* 2.05* 2.00*   CALCIUM mg/dL 8.4* 8.3* 8.8   GLUCOSE mg/dL 92 86 131*     Results from last 7 days   Lab Units 05/07/19  1310   INR  1.04       Scheduled Meds:     amitriptyline 25 mg Oral Nightly   amLODIPine 10 mg Oral Q24H   aspirin 81 mg Oral Daily   atorvastatin 80 mg Oral Daily   baclofen 5 mg Oral Q12H   carvedilol 25 mg Oral BID With Meals   cholecalciferol 1,000 Units Oral BID   folic acid 1 mg Oral Daily   insulin glargine 10 Units Subcutaneous Nightly   insulin glargine 15 Units Subcutaneous QAM   insulin lispro 0-14 Units Subcutaneous 4x Daily With Meals & Nightly   insulin lispro 5 Units Subcutaneous TID With Meals   levETIRAcetam 500 mg Oral Q12H   levothyroxine 50 mcg Oral Daily   linagliptin 5 mg Oral Daily   pantoprazole 40 mg Oral QAM   sennosides-docusate sodium 2 tablet Oral Nightly   sodium chloride 3 mL Intravenous Q12H   ticagrelor 90 mg Oral BID     PRN Meds:   •  acetaminophen **OR** acetaminophen  •  bisacodyl  •  dextrose  •  dextrose  •  glucagon (human recombinant)  •  ondansetron **OR** ondansetron  •  [COMPLETED] Insert peripheral IV **AND** sodium chloride  •  sodium chloride  Continuous Infusions:       Assessment/Plan   Active Hospital Problems    Diagnosis  POA   • **Acute ischemic stroke (CMS/HCC) [I63.9]  Yes   • Left leg weakness [R29.898]  Yes   • HTN (hypertension) [I10]  Yes   • GERD without esophagitis [K21.9]  Yes   • History of pontine stroke [Z86.73]  Not Applicable   • CKD (chronic kidney disease) stage 3, GFR 30-59 ml/min (CMS/HCC) [N18.3]  Yes   • History of meningioma of the brain [Z86.011]  Not Applicable   • Subclinical hypothyroidism [E03.9]  Yes   • Type 2 diabetes mellitus with complication, with long-term current use of insulin (CMS/[facility]) [E11.8, Z79.4]   Not Applicable   • Vitamin D deficiency [E55.9]  Yes   • Hyperlipidemia [E78.5]  Yes      Resolved Hospital Problems   No resolved problems to display.       Assessment & Plan    - Continue therapy  -Continue current medication regimen.  He is off of the heparin drip  -Remove sutures from the right upper chest wall     Disposition  To acute rehab when precert obtained.  Possibly later today.      Alvin Gallo MD  [facility]  05/10/19  12:48 PM

## 2019-05-10 NOTE — PROGRESS NOTES
Continued Stay Note  [facility]     Patient Name: Rocky Logan Jr.  MRN: [id number]  Today's Date: 5/10/2019    Admit Date: 5/3/2019    Discharge Plan     Row Name 05/10/19 1025       Plan    Plan  SNF- Referrals made, will need [facility] pre-cert    Patient/Family in Agreement with Plan  yes    Plan Comments  Spoke with [facility], unable to accept due to the physical limitations of pts parents that he lives with and thus will need a longer rehab stay. Kyra discussed with Daniela pts sister. CCP spoke with referrals and notified [facility] unable to accept. [facility] is out of network with [employer] insurance but [facility] is in network. Await SNF referral outcomes. Nasra NUNEZ/CCP    Row Name 05/10/19 0959       Plan    Plan  [facility] vs [facility]- will need [facility] Pre-cert    Patient/Family in Agreement with Plan  yes    Plan Comments  CCP spoke with pts sister Daniela and pt at bedside, CCP explained still awaiting Acute Rehab decision and need to have SNF backup choices, as MD ready for dc and will need to initate pre-cert. CCP reviewed SNF list and Daniela requested referral to [facility], [facility], [facility], [facility] and [facility]. Spoke with [facility] Bed availablity is low but will evaluate. CCP left vm for [facility] for [facility] and [facility]. CCP called [facility] and [facility]. Spoke with Kyra[facility] and she will call pts sister Daniela and discuss. Packet in CCP office. Nasra NUNEZ/CCP        Discharge Codes    No documentation.             Karla Lazo, RN

## 2019-05-10 NOTE — PROGRESS NOTES
Continued Stay Note  [facility]     Patient Name: Rocky Logan Jr.  MRN: [id number]  Today's Date: 5/10/2019    Admit Date: 5/3/2019    Discharge Plan     Row Name 05/10/19 1135       Plan    Plan  [facility] pending pre-cert from [place]    Patient/Family in Agreement with Plan  yes    Plan Comments  Spoke with Susi/[facility] both [facility] and [place] [facility] are out of network with pts insurance and he doesnt have out of network benefits. CCP spoke with Asha/[facility] pt is accepted and bed available pending [facility] pre-cert. CCP spoke with pt, sister Daniela and pts mom at bedside, updated and they state [facility] is 1st choice. CCP discussed transportation options and Ambulance disclaimer provided. Family verbalized understanding they will see how he does with PT. Notified [facility] is [facility]. CCP spoke with Asha/[facility] and they will initate pre-cert with possible admit later today pending prec-ert obtained. Packet started in CCP office. Nasra NUNEZ/CCP    Row Name 05/10/19 1025       Plan    Plan  SNF- Referrals made, will need [facility] pre-cert    Patient/Family in Agreement with Plan  yes    Plan Comments  Spoke with [facility], unable to accept due to the physical limitations of pts parents that he lives with and thus will need a longer rehab stay. Kyra discussed with Daniela pts sister. CCP spoke with referrals and notified Acute Rehab unable to accept. [facility] is out of network with [facility] insurance but [facility] is in network. Await SNF referral outcomes. Nasra NUNEZ/CCP    Row Name 05/10/19 0959       Plan    Plan  [facility] vs [facility]F- will need [facility] Pre-cert    Patient/Family in Agreement with Plan  yes    Plan Comments  CCP spoke with pts sister Daniela and pt at bedside, CCP explained still awaiting Acute Rehab decision and need to have SNF backup choices, as MD ready for dc and will need to initate pre-cert.  CCP reviewed SNF list and Daniela requested referral to [facility], [facility], [place], [facility] and [facility]. Spoke with [facility] Bed availablity is low but will evaluate. CCP left vm for [facility] for [place] and [facility]. CCP called [facility] and [facility]. Spoke with Ky[facility] and she will call pts sister Daniela and discuss. Packet in CCP office. Nasra NUNEZ/CCP        Discharge Codes    No documentation.             Karla Lazo, RN

## 2019-05-13 NOTE — PROGRESS NOTES
Case Management Discharge Note    Final Note: [facility] [place]    Destination - Selection Complete      Service Provider Request Status Selected Services Address Phone Number Fax Number    [facility] [address] [address] [phone]      Durable Medical Equipment      No service has been selected for the patient.      Dialysis/Infusion      No service has been selected for the patient.      Home Medical Care      No service has been selected for the patient.      Therapy      No service has been selected for the patient.      Community Resources      No service has been selected for the patient.             Final Discharge Disposition Code: 03 - skilled nursing facility (SNF)

## 2019-05-14 ENCOUNTER — RESULTS ENCOUNTER (OUTPATIENT)
Dept: ENDOCRINOLOGY | Age: 59
End: 2019-05-14

## 2019-05-14 DIAGNOSIS — E03.9 ACQUIRED HYPOTHYROIDISM: ICD-10-CM

## 2019-05-14 DIAGNOSIS — Z79.4 TYPE 2 DIABETES MELLITUS WITH COMPLICATION, WITH LONG-TERM CURRENT USE OF INSULIN (HCC): ICD-10-CM

## 2019-05-14 DIAGNOSIS — E78.5 HYPERLIPIDEMIA, UNSPECIFIED HYPERLIPIDEMIA TYPE: ICD-10-CM

## 2019-05-14 DIAGNOSIS — E11.8 TYPE 2 DIABETES MELLITUS WITH COMPLICATION, WITH LONG-TERM CURRENT USE OF INSULIN (HCC): ICD-10-CM

## 2019-05-15 ENCOUNTER — TELEPHONE (OUTPATIENT)
Dept: ENDOCRINOLOGY | Age: 59
End: 2019-05-15

## 2019-05-15 NOTE — TELEPHONE ENCOUNTER
Spoke with jose from [facility] they were needing a med list for patient  Faxed listed to jose at fax #  [phone]          ----- Message from Joanna Shah MA sent at 5/13/2019  2:07 PM EDT -----  Contact: JOSE- [facility]  JOSE FROM [facility] CALLED REQUESTING A UPDATE MEDICATION LIST ON THIS PT. SAID PT WAS IN REHAB AND THERE HAS BEEN SEVERAL MEDICATION CHANGES AND THE  WANT THE MOST CURRENT. ESPECIALLY WITH PT INSULIN.     CALL JOSE [phone]

## 2019-05-21 ENCOUNTER — TELEPHONE (OUTPATIENT)
Dept: ENDOCRINOLOGY | Age: 59
End: 2019-05-21

## 2019-05-21 NOTE — TELEPHONE ENCOUNTER
No phone number was giving to call the patient sister back          ----- Message from Joanna Shah MA sent at 5/16/2019  9:21 AM EDT -----  Contact: adriana stevenson  Pt sister called stating pt is in rehab and his b/s are running in the 300's. She ask if she can be called back.

## 2019-05-24 ENCOUNTER — TELEPHONE (OUTPATIENT)
Dept: ENDOCRINOLOGY | Age: 59
End: 2019-05-24

## 2019-05-24 NOTE — TELEPHONE ENCOUNTER
NO NUMBER FOR SISTER WAS GIVEN TO CALL HER BACK WITH INSTUCTION      ----- Message from Kamran Carrasco MD sent at 5/16/2019 11:04 AM EDT -----  Contact: sisiter graham  It depends upon which rehab he is in.  If patient is in [facility] we would be able to help the patient by following him provided we get consulted.  If not I need to know his blood sugars at least for the last 1 to 2 weeks and also the insulin regimen that they are following at the rehab.    ----- Message -----  From: Di Castro MA  Sent: 5/16/2019  10:10 AM  To: Kamran Carrasco MD        ----- Message -----  From: Joanna Shah MA  Sent: 5/16/2019   9:21 AM  To: Di Castro MA    Pt sister called stating pt is in rehab and his b/s are running in the 300's. She ask if she can be called back.

## 2019-05-30 RX ORDER — INSULIN GLARGINE 300 U/ML
INJECTION, SOLUTION SUBCUTANEOUS
Qty: 10 PEN | Refills: 2 | Status: SHIPPED | OUTPATIENT
Start: 2019-05-30

## 2019-08-27 ENCOUNTER — RESULTS ENCOUNTER (OUTPATIENT)
Dept: ENDOCRINOLOGY | Age: 59
End: 2019-08-27

## 2019-08-27 DIAGNOSIS — E03.9 ACQUIRED HYPOTHYROIDISM: ICD-10-CM

## 2019-08-27 DIAGNOSIS — E78.5 HYPERLIPIDEMIA, UNSPECIFIED HYPERLIPIDEMIA TYPE: ICD-10-CM

## 2019-08-27 DIAGNOSIS — E11.8 TYPE 2 DIABETES MELLITUS WITH COMPLICATION, WITH LONG-TERM CURRENT USE OF INSULIN (HCC): ICD-10-CM

## 2019-08-27 DIAGNOSIS — Z79.4 TYPE 2 DIABETES MELLITUS WITH COMPLICATION, WITH LONG-TERM CURRENT USE OF INSULIN (HCC): Primary | ICD-10-CM

## 2019-08-27 DIAGNOSIS — E11.8 TYPE 2 DIABETES MELLITUS WITH COMPLICATION, WITH LONG-TERM CURRENT USE OF INSULIN (HCC): Primary | ICD-10-CM

## 2019-08-27 DIAGNOSIS — Z79.4 TYPE 2 DIABETES MELLITUS WITH COMPLICATION, WITH LONG-TERM CURRENT USE OF INSULIN (HCC): ICD-10-CM

## 2019-09-11 ENCOUNTER — OFFICE VISIT (OUTPATIENT)
Dept: ENDOCRINOLOGY | Age: 59
End: 2019-09-11

## 2019-09-11 VITALS
DIASTOLIC BLOOD PRESSURE: 68 MMHG | WEIGHT: 210 LBS | SYSTOLIC BLOOD PRESSURE: 122 MMHG | HEART RATE: 69 BPM | OXYGEN SATURATION: 99 % | BODY MASS INDEX: 32.96 KG/M2 | HEIGHT: 67 IN

## 2019-09-11 DIAGNOSIS — E11.8 TYPE 2 DIABETES MELLITUS WITH COMPLICATION, WITH LONG-TERM CURRENT USE OF INSULIN (HCC): Primary | ICD-10-CM

## 2019-09-11 DIAGNOSIS — E03.9 ACQUIRED HYPOTHYROIDISM: ICD-10-CM

## 2019-09-11 DIAGNOSIS — E78.5 HYPERLIPIDEMIA, UNSPECIFIED HYPERLIPIDEMIA TYPE: ICD-10-CM

## 2019-09-11 DIAGNOSIS — Z79.4 TYPE 2 DIABETES MELLITUS WITH COMPLICATION, WITH LONG-TERM CURRENT USE OF INSULIN (HCC): Primary | ICD-10-CM

## 2019-09-11 PROCEDURE — 99214 OFFICE O/P EST MOD 30 MIN: CPT | Performed by: INTERNAL MEDICINE

## 2019-09-11 RX ORDER — HYDRALAZINE HYDROCHLORIDE 25 MG/1
TABLET, FILM COATED ORAL
COMMUNITY
Start: 2019-08-20

## 2019-09-11 RX ORDER — FUROSEMIDE 20 MG/1
TABLET ORAL
COMMUNITY
Start: 2019-08-20

## 2019-09-11 NOTE — PROGRESS NOTES
59 y.o.    Patient Care Team:  Karen Martinez APRN as PCP - General (Family Medicine)    Chief Complaint:    Follow up/ type 2 diabetes mellitus  Subjective     HPI    Rocky Logan Jr. 59 y.o. presents with Type 2 dm as a f/u patient.   Patient was recently hospitalized in July 2019 with a stroke and later he was transferred to [place].  He did have issues with elevated blood sugars when he was hospitalized and when he was in the rehab.         Type 2 dm - Diagnosed in 2000. Was started on oral agents initially. Insulin was started in 2008.   Today in the clinic patient reports that he is on Toujeo 58 units in the morning and 26 units at bedtime, Humalog 5 units with each meal plus high-dose Humalog sliding scale 3 units for 50 above 150.  Also on Tradjenta 5 mg oral daily.  Since he has been discharged from the rehab patient's appetite has been poor due to the weaknesses in his left upper and lower extremity he has difficulty in preparing or eating his meals.  Average blood sugars are around 100-140.  He does have few low blood sugars at least 2-3 times a month and the lowest being 42.  Highest blood sugar was 260.  Does have dm retinopathy, currently getting the treatment for the this.   Does c/o dm neuropathy. No ulcers or amputations of his toes in his b/l feet.   History of CABG in 2008, history of CK D which is stable, history of CVA with left-sided weakness.  He is physically active.  Weight has been relatively stable.  Trying to follow dm diet, recently had dm education with Candida Freire.   On Ace inhibitor.     Hyperlipidemia  On Lipitor 80 mg oral daily, fenofibrate 48 mg po daily, fish oil.      Hypothyroidism -   On levotrhyroxine 50 mcg oral daily.           Reviewed primary care physician's/consulting physician documentation and lab results :     Interval History      The following portions of the patient's history were reviewed and updated as appropriate: allergies, current medications, past  family history, past medical history, past social history, past surgical history and problem list.    Past Medical History:   Diagnosis Date   • Diabetes mellitus (CMS/HCC)    • Hypertension    • Stroke (CMS/HCC)      Family History   Problem Relation Age of Onset   • Heart disease Mother    • Diabetes Father      Social History     Socioeconomic History   • Marital status: Single     Spouse name: Not on file   • Number of children: Not on file   • Years of education: Not on file   • Highest education level: Not on file   Tobacco Use   • Smoking status: Never Smoker   • Smokeless tobacco: Never Used   Substance and Sexual Activity   • Alcohol use: Yes     Alcohol/week: 1.8 oz     Types: 3 Glasses of wine per week   • Drug use: No   • Sexual activity: Defer     Allergies   Allergen Reactions   • Sulfa Antibiotics Rash       Current Outpatient Medications:   •  amitriptyline (ELAVIL) 25 MG tablet, Take 25 mg by mouth Every Night., Disp: , Rfl:   •  amLODIPine (NORVASC) 10 MG tablet, Take 1 tablet by mouth Daily., Disp: 30 tablet, Rfl: 1  •  aspirin 81 MG chewable tablet, Chew 1 tablet Daily., Disp: , Rfl:   •  atorvastatin (LIPITOR) 80 MG tablet, TAKE ONE TABLET BY MOUTH DAILY, Disp: 90 tablet, Rfl: 10  •  Baclofen 5 MG tablet, Take 5 mg by mouth Every 12 (Twelve) Hours., Disp: , Rfl:   •  carvedilol (COREG) 25 MG tablet, Take 25 mg by mouth 2 (Two) Times a Day With Meals., Disp: , Rfl:   •  cholecalciferol (VITAMIN D3) 1000 units tablet, Take 1,000 Units by mouth 2 (Two) Times a Day., Disp: , Rfl:   •  folic acid (FOLVITE) 1 MG tablet, Take 1 tablet by mouth Daily., Disp: , Rfl:   •  furosemide (LASIX) 20 MG tablet, , Disp: , Rfl:   •  glucosamine-chondroitin 500-400 MG capsule capsule, Take 1 capsule by mouth Daily., Disp: , Rfl:   •  glucose blood (ACCU-CHEK MILENA) test strip, To check to check 3 - 4 times, Disp: 100 each, Rfl: 3  •  glucose blood (ACCU-CHEK COMPACT PLUS) test strip, To check 3 - 4 times a day,  Disp: 100 each, Rfl: 2  •  glucose blood (FREESTYLE LITE) test strip, USE TO TEST BLOOD SUGAR 4 TIMES DAILY ICD 10 CODE E11.9, Disp: 200 each, Rfl: 5  •  hydrALAZINE (APRESOLINE) 25 MG tablet, , Disp: , Rfl:   •  insulin lispro (humaLOG) 100 UNIT/ML injection, Inject 5 Units under the skin into the appropriate area as directed 3 (Three) Times a Day With Meals., Disp: , Rfl: 12  •  Lancets (ACCU-CHEK SOFT TOUCH) lancets, To check 3 - 4 times a day, Disp: 100 each, Rfl: 2  •  levETIRAcetam (KEPPRA) 500 MG tablet, Take 500 mg by mouth 2 (Two) Times a Day., Disp: , Rfl:   •  linagliptin (TRADJENTA) 5 MG tablet tablet, Take 5 mg by mouth Daily., Disp: , Rfl:   •  lisinopril (PRINIVIL,ZESTRIL) 40 MG tablet, Take 1 tablet by mouth Daily., Disp: 30 tablet, Rfl: 1  •  Multiple Vitamins-Minerals (MULTIVITAMIN ADULT PO), Take 1 tablet by mouth Daily., Disp: , Rfl:   •  omeprazole (priLOSEC) 40 MG capsule, Take 40 mg by mouth Daily., Disp: , Rfl:   •  sennosides-docusate sodium (SENOKOT-S) 8.6-50 MG tablet, Take 2 tablets by mouth Every Night., Disp: , Rfl:   •  ticagrelor (BRILINTA) 90 MG tablet tablet, Take 1 tablet by mouth 2 (Two) Times a Day., Disp: 60 tablet, Rfl:   •  TOUJEO SOLOSTAR 300 UNIT/ML solution pen-injector, INJECT 30 UNITS SUBCUTANEOUSLY EVERY MORNING AND 60 UNITS EVERY NIGHT AT BEDTIME, Disp: 10 pen, Rfl: 2  •  omega-3 acid ethyl esters (LOVAZA) 1 g capsule, TAKE TWO CAPSULES BY MOUTH DAILY, Disp: 60 capsule, Rfl: 10        Review of Systems   Constitutional: Negative for appetite change, fatigue and fever.   Eyes: Negative for visual disturbance.   Respiratory: Positive for shortness of breath.    Cardiovascular: Negative for palpitations and leg swelling.   Gastrointestinal: Negative for abdominal pain and vomiting.   Endocrine: Positive for polydipsia. Negative for polyuria.   Musculoskeletal: Negative for joint swelling and neck pain.   Skin: Negative for rash.   Neurological: Positive for tremors.  "Negative for weakness and numbness.   Psychiatric/Behavioral: Negative for behavioral problems.       Objective       Vitals:    09/11/19 0956   BP: 122/68   Pulse: 69   SpO2: 99%   Weight: 95.3 kg (210 lb)   Height: 168.9 cm (66.5\")     Body mass index is 33.39 kg/m².      Physical Exam   Constitutional: He is oriented to person, place, and time. He appears well-nourished.   obese   HENT:   Head: Normocephalic and atraumatic.   Wide neck   Eyes: Conjunctivae and EOM are normal.   Neck: Normal range of motion. Neck supple. Carotid bruit is not present. No thyromegaly present.   Acanthosis nigricans   Cardiovascular: Normal rate and normal heart sounds.   Pulmonary/Chest: Effort normal and breath sounds normal. No stridor. No respiratory distress.   Abdominal: Soft. Bowel sounds are normal. He exhibits no distension. There is no tenderness.   Central obesity   Musculoskeletal: He exhibits tenderness and deformity. He exhibits no edema.   Neurological: He is alert and oriented to person, place, and time. He exhibits abnormal muscle tone.   Skin: Skin is warm and dry.   Psychiatric: He has a normal mood and affect. His behavior is normal.   Vitals reviewed.    Results Review:     I reviewed the patient's new clinical results and mentioned them above in HPI and in plan as well.    Medical records reviewed  Summary:done      Admission on 05/03/2019, Discharged on 05/10/2019   No results displayed because visit has over 200 results.        Lab Results   Component Value Date    HGBA1C 7.10 (H) 05/04/2019    HGBA1C 7.40 (H) 01/31/2019    HGBA1C 7.21 (H) 10/30/2018     Lab Results   Component Value Date    MICROALBUR 86.4 05/21/2017    CREATININE 1.84 (H) 05/05/2019     Imaging Results (most recent)     None                Assessment and Plan:    Rocky was seen today for diabetes.    Diagnoses and all orders for this visit:    Type 2 diabetes mellitus with complication, with long-term current use of insulin (CMS/[facility])  -     " "Hemoglobin A1c; Future  -     Basic Metabolic Panel; Future  -     Lipid Panel; Future  -     TSH; Future  -     Microalbumin / Creatinine Urine Ratio - Urine, Clean Catch; Future  -     Vitamin B12 & Folate; Future  -     T4, Free; Future    Hyperlipidemia, unspecified hyperlipidemia type  -     Hemoglobin A1c; Future  -     Basic Metabolic Panel; Future  -     Lipid Panel; Future  -     TSH; Future  -     Microalbumin / Creatinine Urine Ratio - Urine, Clean Catch; Future  -     Vitamin B12 & Folate; Future  -     T4, Free; Future    Acquired hypothyroidism  -     Hemoglobin A1c; Future  -     Basic Metabolic Panel; Future  -     Lipid Panel; Future  -     TSH; Future  -     Microalbumin / Creatinine Urine Ratio - Urine, Clean Catch; Future  -     Vitamin B12 & Folate; Future  -     T4, Free; Future        Type 2 diabetes mellitus-uncontrolled  We will get his lab results from the [place] office.  Will make further changes to his insulin regimen based on his laboratory data    Hyperlipidemia  Continue Lipitor 80 mg oral daily    Hypothyroidism  Continue levothyroxine 50 mcg oral daily  Reviewed Lab results with the patient.             Kamran Carrasco MD  09/11/19    [facility] Dragon / transcription disclaimer:     \"Dictated utilizing Dragon dictation\".  "

## 2019-10-29 RX ORDER — LEVOTHYROXINE SODIUM 0.05 MG/1
TABLET ORAL
Qty: 30 TABLET | Refills: 10 | Status: SHIPPED | OUTPATIENT
Start: 2019-10-29

## 2019-12-10 ENCOUNTER — RESULTS ENCOUNTER (OUTPATIENT)
Dept: ENDOCRINOLOGY | Age: 59
End: 2019-12-10

## 2019-12-10 DIAGNOSIS — Z79.4 TYPE 2 DIABETES MELLITUS WITH COMPLICATION, WITH LONG-TERM CURRENT USE OF INSULIN (HCC): ICD-10-CM

## 2019-12-10 DIAGNOSIS — E03.9 ACQUIRED HYPOTHYROIDISM: ICD-10-CM

## 2019-12-10 DIAGNOSIS — E11.8 TYPE 2 DIABETES MELLITUS WITH COMPLICATION, WITH LONG-TERM CURRENT USE OF INSULIN (HCC): ICD-10-CM

## 2019-12-10 DIAGNOSIS — E78.5 HYPERLIPIDEMIA, UNSPECIFIED HYPERLIPIDEMIA TYPE: ICD-10-CM

## 2020-08-28 PROCEDURE — 80048 BASIC METABOLIC PNL TOTAL CA: CPT

## 2020-08-28 PROCEDURE — 85025 COMPLETE CBC W/AUTO DIFF WBC: CPT

## 2020-08-28 PROCEDURE — 81001 URINALYSIS AUTO W/SCOPE: CPT

## 2020-08-29 ENCOUNTER — LAB REQUISITION (OUTPATIENT)
Dept: LAB | Facility: HOSPITAL | Age: 60
End: 2020-08-29

## 2020-08-29 DIAGNOSIS — Z00.00 ROUTINE GENERAL MEDICAL EXAMINATION AT A HEALTH CARE FACILITY: ICD-10-CM

## 2020-08-29 LAB
ANION GAP SERPL CALCULATED.3IONS-SCNC: 16.4 MMOL/L (ref 5–15)
BACTERIA UR QL AUTO: ABNORMAL /HPF
BASOPHILS # BLD AUTO: 0.05 10*3/MM3 (ref 0–0.2)
BASOPHILS NFR BLD AUTO: 0.3 % (ref 0–1.5)
BILIRUB UR QL STRIP: NEGATIVE
BUN SERPL-MCNC: 48 MG/DL (ref 8–23)
BUN/CREAT SERPL: 23 (ref 7–25)
CALCIUM SPEC-SCNC: 8.4 MG/DL (ref 8.6–10.5)
CHLORIDE SERPL-SCNC: 107 MMOL/L (ref 98–107)
CLARITY UR: ABNORMAL
CO2 SERPL-SCNC: 15.6 MMOL/L (ref 22–29)
COLOR UR: ABNORMAL
CREAT SERPL-MCNC: 2.09 MG/DL (ref 0.76–1.27)
DEPRECATED RDW RBC AUTO: 45 FL (ref 37–54)
EOSINOPHIL # BLD AUTO: 0.15 10*3/MM3 (ref 0–0.4)
EOSINOPHIL NFR BLD AUTO: 0.9 % (ref 0.3–6.2)
ERYTHROCYTE [DISTWIDTH] IN BLOOD BY AUTOMATED COUNT: 14.2 % (ref 12.3–15.4)
GFR SERPL CREATININE-BSD FRML MDRD: 32 ML/MIN/1.73
GLUCOSE SERPL-MCNC: 231 MG/DL (ref 65–99)
GLUCOSE UR STRIP-MCNC: ABNORMAL MG/DL
HCT VFR BLD AUTO: 32.9 % (ref 37.5–51)
HGB BLD-MCNC: 11.2 G/DL (ref 13–17.7)
HGB UR QL STRIP.AUTO: ABNORMAL
HYALINE CASTS UR QL AUTO: ABNORMAL /LPF
IMM GRANULOCYTES # BLD AUTO: 0.06 10*3/MM3 (ref 0–0.05)
IMM GRANULOCYTES NFR BLD AUTO: 0.4 % (ref 0–0.5)
KETONES UR QL STRIP: NEGATIVE
LEUKOCYTE ESTERASE UR QL STRIP.AUTO: ABNORMAL
LYMPHOCYTES # BLD AUTO: 0.78 10*3/MM3 (ref 0.7–3.1)
LYMPHOCYTES NFR BLD AUTO: 4.8 % (ref 19.6–45.3)
MCH RBC QN AUTO: 29.6 PG (ref 26.6–33)
MCHC RBC AUTO-ENTMCNC: 34 G/DL (ref 31.5–35.7)
MCV RBC AUTO: 87 FL (ref 79–97)
MONOCYTES # BLD AUTO: 1.55 10*3/MM3 (ref 0.1–0.9)
MONOCYTES NFR BLD AUTO: 9.6 % (ref 5–12)
NEUTROPHILS NFR BLD AUTO: 13.52 10*3/MM3 (ref 1.7–7)
NEUTROPHILS NFR BLD AUTO: 84 % (ref 42.7–76)
NITRITE UR QL STRIP: POSITIVE
NRBC BLD AUTO-RTO: 0 /100 WBC (ref 0–0.2)
PH UR STRIP.AUTO: <=5 [PH] (ref 5–8)
PLATELET # BLD AUTO: 175 10*3/MM3 (ref 140–450)
PMV BLD AUTO: 11.2 FL (ref 6–12)
POTASSIUM SERPL-SCNC: 4.1 MMOL/L (ref 3.5–5.2)
PROT UR QL STRIP: ABNORMAL
RBC # BLD AUTO: 3.78 10*6/MM3 (ref 4.14–5.8)
RBC # UR: ABNORMAL /HPF
REF LAB TEST METHOD: ABNORMAL
SODIUM SERPL-SCNC: 139 MMOL/L (ref 136–145)
SP GR UR STRIP: 1.01 (ref 1–1.03)
SQUAMOUS #/AREA URNS HPF: ABNORMAL /HPF
UROBILINOGEN UR QL STRIP: ABNORMAL
WBC # BLD AUTO: 16.11 10*3/MM3 (ref 3.4–10.8)
WBC UR QL AUTO: ABNORMAL /HPF